# Patient Record
Sex: MALE | Race: BLACK OR AFRICAN AMERICAN | Employment: OTHER | URBAN - METROPOLITAN AREA
[De-identification: names, ages, dates, MRNs, and addresses within clinical notes are randomized per-mention and may not be internally consistent; named-entity substitution may affect disease eponyms.]

---

## 2018-05-31 ENCOUNTER — OFFICE VISIT (OUTPATIENT)
Dept: FAMILY MEDICINE CLINIC | Age: 43
End: 2018-05-31
Payer: MEDICAID

## 2018-05-31 VITALS
RESPIRATION RATE: 20 BRPM | HEIGHT: 67 IN | WEIGHT: 177.4 LBS | DIASTOLIC BLOOD PRESSURE: 70 MMHG | SYSTOLIC BLOOD PRESSURE: 110 MMHG | OXYGEN SATURATION: 97 % | BODY MASS INDEX: 27.84 KG/M2 | TEMPERATURE: 97.5 F | HEART RATE: 81 BPM

## 2018-05-31 DIAGNOSIS — K58.0 IRRITABLE BOWEL SYNDROME WITH DIARRHEA: ICD-10-CM

## 2018-05-31 DIAGNOSIS — Z72.51 UNPROTECTED SEX: ICD-10-CM

## 2018-05-31 DIAGNOSIS — I10 ESSENTIAL HYPERTENSION: ICD-10-CM

## 2018-05-31 DIAGNOSIS — L73.9 FOLLICULITIS: ICD-10-CM

## 2018-05-31 DIAGNOSIS — E78.00 PURE HYPERCHOLESTEROLEMIA: ICD-10-CM

## 2018-05-31 DIAGNOSIS — F10.11 HISTORY OF ALCOHOL ABUSE: ICD-10-CM

## 2018-05-31 DIAGNOSIS — J32.9 CHRONIC SINUSITIS, UNSPECIFIED LOCATION: ICD-10-CM

## 2018-05-31 DIAGNOSIS — J30.2 CHRONIC SEASONAL ALLERGIC RHINITIS, UNSPECIFIED TRIGGER: ICD-10-CM

## 2018-05-31 DIAGNOSIS — F41.9 ANXIETY: ICD-10-CM

## 2018-05-31 DIAGNOSIS — B00.9 HERPES SIMPLEX TYPE II INFECTION: ICD-10-CM

## 2018-05-31 DIAGNOSIS — J32.4 PANSINUSITIS, UNSPECIFIED CHRONICITY: ICD-10-CM

## 2018-05-31 DIAGNOSIS — L30.9 DERMATITIS: ICD-10-CM

## 2018-05-31 DIAGNOSIS — K21.9 GASTRIC REFLUX: Primary | ICD-10-CM

## 2018-05-31 DIAGNOSIS — Z83.3 FAMILY HISTORY OF DIABETES MELLITUS: ICD-10-CM

## 2018-05-31 DIAGNOSIS — Z72.51 UNPROTECTED SEXUAL INTERCOURSE: ICD-10-CM

## 2018-05-31 DIAGNOSIS — Z00.00 PREVENTATIVE HEALTH CARE: ICD-10-CM

## 2018-05-31 PROBLEM — E78.5 HYPERLIPIDEMIA: Status: ACTIVE | Noted: 2018-05-31

## 2018-05-31 PROCEDURE — G8419 CALC BMI OUT NRM PARAM NOF/U: HCPCS | Performed by: NURSE PRACTITIONER

## 2018-05-31 PROCEDURE — G8427 DOCREV CUR MEDS BY ELIG CLIN: HCPCS | Performed by: NURSE PRACTITIONER

## 2018-05-31 PROCEDURE — 1036F TOBACCO NON-USER: CPT | Performed by: NURSE PRACTITIONER

## 2018-05-31 PROCEDURE — 90715 TDAP VACCINE 7 YRS/> IM: CPT | Performed by: NURSE PRACTITIONER

## 2018-05-31 PROCEDURE — 90471 IMMUNIZATION ADMIN: CPT | Performed by: NURSE PRACTITIONER

## 2018-05-31 PROCEDURE — 99205 OFFICE O/P NEW HI 60 MIN: CPT | Performed by: NURSE PRACTITIONER

## 2018-05-31 RX ORDER — PANTOPRAZOLE SODIUM 40 MG/1
40 TABLET, DELAYED RELEASE ORAL DAILY PRN
Qty: 30 TABLET | Refills: 0 | Status: CANCELLED | OUTPATIENT
Start: 2018-05-31

## 2018-05-31 RX ORDER — RANITIDINE 150 MG/1
150 TABLET ORAL 2 TIMES DAILY
Qty: 60 TABLET | Refills: 3 | Status: SHIPPED | OUTPATIENT
Start: 2018-05-31 | End: 2018-11-26 | Stop reason: SDUPTHER

## 2018-05-31 RX ORDER — FLUTICASONE PROPIONATE 50 MCG
1 SPRAY, SUSPENSION (ML) NASAL DAILY
Qty: 1 BOTTLE | Refills: 3 | Status: SHIPPED | OUTPATIENT
Start: 2018-05-31 | End: 2019-07-15 | Stop reason: SDUPTHER

## 2018-05-31 RX ORDER — DOXYCYCLINE HYCLATE 100 MG/1
100 CAPSULE ORAL 2 TIMES DAILY
Qty: 20 CAPSULE | Refills: 0 | Status: SHIPPED | OUTPATIENT
Start: 2018-05-31 | End: 2018-06-10

## 2018-05-31 RX ORDER — LOSARTAN POTASSIUM 50 MG/1
50 TABLET ORAL DAILY
COMMUNITY
End: 2018-05-31 | Stop reason: SDUPTHER

## 2018-05-31 RX ORDER — LOSARTAN POTASSIUM 50 MG/1
50 TABLET ORAL DAILY
Qty: 30 TABLET | Refills: 1 | Status: SHIPPED | OUTPATIENT
Start: 2018-05-31 | End: 2018-09-10 | Stop reason: SDUPTHER

## 2018-05-31 RX ORDER — TRIAMCINOLONE ACETONIDE 1 MG/G
CREAM TOPICAL
Qty: 45 G | Refills: 0 | Status: SHIPPED | OUTPATIENT
Start: 2018-05-31 | End: 2019-10-14 | Stop reason: ALTCHOICE

## 2018-05-31 ASSESSMENT — ENCOUNTER SYMPTOMS
EYE DISCHARGE: 0
ABDOMINAL PAIN: 0
COUGH: 0
BLOOD IN STOOL: 0
SHORTNESS OF BREATH: 0

## 2018-05-31 ASSESSMENT — PATIENT HEALTH QUESTIONNAIRE - PHQ9
SUM OF ALL RESPONSES TO PHQ QUESTIONS 1-9: 1
SUM OF ALL RESPONSES TO PHQ9 QUESTIONS 1 & 2: 1
1. LITTLE INTEREST OR PLEASURE IN DOING THINGS: 0
2. FEELING DOWN, DEPRESSED OR HOPELESS: 1

## 2018-06-11 ENCOUNTER — TELEPHONE (OUTPATIENT)
Dept: FAMILY MEDICINE CLINIC | Age: 43
End: 2018-06-11

## 2018-06-11 DIAGNOSIS — B00.9 HERPES SIMPLEX TYPE II INFECTION: Primary | ICD-10-CM

## 2018-06-11 RX ORDER — ACYCLOVIR 200 MG/1
200 CAPSULE ORAL
Qty: 25 CAPSULE | Refills: 0 | Status: SHIPPED | OUTPATIENT
Start: 2018-06-11 | End: 2018-06-16

## 2018-06-18 ENCOUNTER — OFFICE VISIT (OUTPATIENT)
Dept: FAMILY MEDICINE CLINIC | Age: 43
End: 2018-06-18
Payer: COMMERCIAL

## 2018-06-18 ENCOUNTER — HOSPITAL ENCOUNTER (OUTPATIENT)
Age: 43
Discharge: HOME | End: 2018-06-18
Payer: COMMERCIAL

## 2018-06-18 VITALS
SYSTOLIC BLOOD PRESSURE: 126 MMHG | BODY MASS INDEX: 27.52 KG/M2 | OXYGEN SATURATION: 98 % | WEIGHT: 181.6 LBS | DIASTOLIC BLOOD PRESSURE: 81 MMHG | HEIGHT: 68 IN | HEART RATE: 75 BPM

## 2018-06-18 DIAGNOSIS — M54.2 NECK PAIN ON RIGHT SIDE: ICD-10-CM

## 2018-06-18 DIAGNOSIS — I10 ESSENTIAL HYPERTENSION: ICD-10-CM

## 2018-06-18 DIAGNOSIS — B35.1 FUNGAL NAIL INFECTION: ICD-10-CM

## 2018-06-18 DIAGNOSIS — Z00.00 PREVENTATIVE HEALTH CARE: ICD-10-CM

## 2018-06-18 DIAGNOSIS — K58.0 IRRITABLE BOWEL SYNDROME WITH DIARRHEA: Primary | ICD-10-CM

## 2018-06-18 DIAGNOSIS — E78.00 PURE HYPERCHOLESTEROLEMIA: ICD-10-CM

## 2018-06-18 DIAGNOSIS — M76.60 ACHILLES TENDON PAIN: ICD-10-CM

## 2018-06-18 DIAGNOSIS — J30.1 HAY FEVER: ICD-10-CM

## 2018-06-18 DIAGNOSIS — Z72.51 UNPROTECTED SEXUAL INTERCOURSE: ICD-10-CM

## 2018-06-18 DIAGNOSIS — B35.1 ONYCHOMYCOSIS: ICD-10-CM

## 2018-06-18 DIAGNOSIS — M25.511 ACUTE PAIN OF RIGHT SHOULDER: ICD-10-CM

## 2018-06-18 LAB
ALBUMIN SERPL-MCNC: 4.5 G/DL (ref 3.5–5.2)
ALBUMIN/GLOBULIN RATIO: NORMAL (ref 1–2.5)
ALP BLD-CCNC: 73 U/L (ref 40–129)
ALT SERPL-CCNC: 23 U/L (ref 5–41)
ANION GAP SERPL CALCULATED.3IONS-SCNC: 12 MMOL/L (ref 9–17)
AST SERPL-CCNC: 22 U/L
BILIRUB SERPL-MCNC: 0.71 MG/DL (ref 0.3–1.2)
BUN BLDV-MCNC: 13 MG/DL (ref 6–20)
BUN/CREAT BLD: NORMAL (ref 9–20)
CALCIUM SERPL-MCNC: 9.6 MG/DL (ref 8.6–10.4)
CHLORIDE BLD-SCNC: 100 MMOL/L (ref 98–107)
CHOLESTEROL/HDL RATIO: 3.2
CHOLESTEROL: 188 MG/DL
CO2: 28 MMOL/L (ref 20–31)
CREAT SERPL-MCNC: 0.92 MG/DL (ref 0.7–1.2)
ESTIMATED AVERAGE GLUCOSE: 111 MG/DL
GFR AFRICAN AMERICAN: >60 ML/MIN
GFR NON-AFRICAN AMERICAN: >60 ML/MIN
GFR SERPL CREATININE-BSD FRML MDRD: NORMAL ML/MIN/{1.73_M2}
GFR SERPL CREATININE-BSD FRML MDRD: NORMAL ML/MIN/{1.73_M2}
GLUCOSE BLD-MCNC: 87 MG/DL (ref 70–99)
HBA1C MFR BLD: 5.5 % (ref 4–6)
HCT VFR BLD CALC: 44.2 % (ref 41–53)
HDLC SERPL-MCNC: 59 MG/DL
HEMOGLOBIN: 14.7 G/DL (ref 13.5–17.5)
HIV AG/AB: NONREACTIVE
LDL CHOLESTEROL: 114 MG/DL (ref 0–130)
MCH RBC QN AUTO: 29.8 PG (ref 26–34)
MCHC RBC AUTO-ENTMCNC: 33.3 G/DL (ref 31–37)
MCV RBC AUTO: 89.4 FL (ref 80–100)
NRBC AUTOMATED: NORMAL PER 100 WBC
PDW BLD-RTO: 14.2 % (ref 11.5–14.9)
PLATELET # BLD: 266 K/UL (ref 150–450)
PMV BLD AUTO: 8.5 FL (ref 6–12)
POTASSIUM SERPL-SCNC: 3.9 MMOL/L (ref 3.7–5.3)
RBC # BLD: 4.94 M/UL (ref 4.5–5.9)
SODIUM BLD-SCNC: 140 MMOL/L (ref 135–144)
T. PALLIDUM, IGG: NONREACTIVE
TOTAL PROTEIN: 7.2 G/DL (ref 6.4–8.3)
TRIGL SERPL-MCNC: 75 MG/DL
VLDLC SERPL CALC-MCNC: NORMAL MG/DL (ref 1–30)
WBC # BLD: 5.8 K/UL (ref 3.5–11)

## 2018-06-18 PROCEDURE — G8419 CALC BMI OUT NRM PARAM NOF/U: HCPCS | Performed by: NURSE PRACTITIONER

## 2018-06-18 PROCEDURE — G8427 DOCREV CUR MEDS BY ELIG CLIN: HCPCS | Performed by: NURSE PRACTITIONER

## 2018-06-18 PROCEDURE — 83036 HEMOGLOBIN GLYCOSYLATED A1C: CPT

## 2018-06-18 PROCEDURE — 4004F PT TOBACCO SCREEN RCVD TLK: CPT | Performed by: NURSE PRACTITIONER

## 2018-06-18 PROCEDURE — 99214 OFFICE O/P EST MOD 30 MIN: CPT | Performed by: NURSE PRACTITIONER

## 2018-06-18 PROCEDURE — 80061 LIPID PANEL: CPT

## 2018-06-18 PROCEDURE — 80053 COMPREHEN METABOLIC PANEL: CPT

## 2018-06-18 PROCEDURE — 36415 COLL VENOUS BLD VENIPUNCTURE: CPT

## 2018-06-18 PROCEDURE — 85027 COMPLETE CBC AUTOMATED: CPT

## 2018-06-18 PROCEDURE — 87389 HIV-1 AG W/HIV-1&-2 AB AG IA: CPT

## 2018-06-18 PROCEDURE — 86780 TREPONEMA PALLIDUM: CPT

## 2018-06-18 RX ORDER — CETIRIZINE HYDROCHLORIDE 10 MG/1
10 TABLET ORAL NIGHTLY PRN
Qty: 30 TABLET | Refills: 5 | Status: SHIPPED | OUTPATIENT
Start: 2018-06-18 | End: 2019-07-15

## 2018-06-18 RX ORDER — CHOLECALCIFEROL (VITAMIN D3) 125 MCG
5 CAPSULE ORAL DAILY
COMMUNITY
End: 2022-07-18 | Stop reason: ALTCHOICE

## 2018-06-18 RX ORDER — CLOTRIMAZOLE AND BETAMETHASONE DIPROPIONATE 10; .64 MG/G; MG/G
CREAM TOPICAL
Qty: 45 G | Refills: 1 | Status: SHIPPED | OUTPATIENT
Start: 2018-06-18 | End: 2018-08-02 | Stop reason: SDUPTHER

## 2018-06-18 ASSESSMENT — ENCOUNTER SYMPTOMS
COUGH: 0
BLOOD IN STOOL: 0
EYE DISCHARGE: 0
SHORTNESS OF BREATH: 0
ABDOMINAL PAIN: 0

## 2018-06-26 ENCOUNTER — HOSPITAL ENCOUNTER (OUTPATIENT)
Dept: PHYSICAL THERAPY | Age: 43
Setting detail: THERAPIES SERIES
Discharge: HOME | End: 2018-06-26
Payer: COMMERCIAL

## 2018-06-26 PROCEDURE — 97161 PT EVAL LOW COMPLEX 20 MIN: CPT

## 2018-06-26 ASSESSMENT — PAIN DESCRIPTION - ONSET: ONSET: SUDDEN

## 2018-06-26 ASSESSMENT — PAIN SCALES - GENERAL: PAINLEVEL_OUTOF10: 5

## 2018-06-26 ASSESSMENT — PAIN DESCRIPTION - LOCATION: LOCATION: SHOULDER

## 2018-06-26 ASSESSMENT — PAIN DESCRIPTION - PROGRESSION: CLINICAL_PROGRESSION: GRADUALLY WORSENING

## 2018-06-26 ASSESSMENT — PAIN DESCRIPTION - ORIENTATION: ORIENTATION: RIGHT

## 2018-06-26 ASSESSMENT — PAIN DESCRIPTION - FREQUENCY: FREQUENCY: CONTINUOUS

## 2018-06-26 ASSESSMENT — PAIN DESCRIPTION - PAIN TYPE: TYPE: CHRONIC PAIN

## 2018-07-26 DIAGNOSIS — K58.0 IRRITABLE BOWEL SYNDROME WITH DIARRHEA: ICD-10-CM

## 2018-07-27 RX ORDER — CITALOPRAM 10 MG/1
10 TABLET ORAL DAILY
Qty: 30 TABLET | Refills: 3 | Status: SHIPPED | OUTPATIENT
Start: 2018-07-27 | End: 2020-10-10 | Stop reason: SDUPTHER

## 2018-08-02 ENCOUNTER — OFFICE VISIT (OUTPATIENT)
Dept: FAMILY MEDICINE CLINIC | Age: 43
End: 2018-08-02
Payer: COMMERCIAL

## 2018-08-02 VITALS
SYSTOLIC BLOOD PRESSURE: 128 MMHG | HEART RATE: 73 BPM | OXYGEN SATURATION: 98 % | TEMPERATURE: 97.7 F | HEIGHT: 69 IN | BODY MASS INDEX: 27.76 KG/M2 | DIASTOLIC BLOOD PRESSURE: 82 MMHG | WEIGHT: 187.4 LBS

## 2018-08-02 DIAGNOSIS — F32.0 MILD SINGLE CURRENT EPISODE OF MAJOR DEPRESSIVE DISORDER (HCC): ICD-10-CM

## 2018-08-02 DIAGNOSIS — F17.290 CIGAR SMOKER: ICD-10-CM

## 2018-08-02 DIAGNOSIS — F41.9 ANXIETY: ICD-10-CM

## 2018-08-02 DIAGNOSIS — B37.2 YEAST DERMATITIS: Primary | ICD-10-CM

## 2018-08-02 PROCEDURE — 90732 PPSV23 VACC 2 YRS+ SUBQ/IM: CPT | Performed by: NURSE PRACTITIONER

## 2018-08-02 PROCEDURE — G8419 CALC BMI OUT NRM PARAM NOF/U: HCPCS | Performed by: NURSE PRACTITIONER

## 2018-08-02 PROCEDURE — 4004F PT TOBACCO SCREEN RCVD TLK: CPT | Performed by: NURSE PRACTITIONER

## 2018-08-02 PROCEDURE — 90471 IMMUNIZATION ADMIN: CPT | Performed by: NURSE PRACTITIONER

## 2018-08-02 PROCEDURE — 99214 OFFICE O/P EST MOD 30 MIN: CPT | Performed by: NURSE PRACTITIONER

## 2018-08-02 PROCEDURE — G8427 DOCREV CUR MEDS BY ELIG CLIN: HCPCS | Performed by: NURSE PRACTITIONER

## 2018-08-02 RX ORDER — CLOTRIMAZOLE AND BETAMETHASONE DIPROPIONATE 10; .64 MG/G; MG/G
CREAM TOPICAL
Qty: 45 G | Refills: 0 | Status: SHIPPED | OUTPATIENT
Start: 2018-08-02 | End: 2018-11-26 | Stop reason: SDUPTHER

## 2018-08-02 ASSESSMENT — ENCOUNTER SYMPTOMS
ABDOMINAL PAIN: 0
SHORTNESS OF BREATH: 0
COUGH: 0
EYE DISCHARGE: 0
BLOOD IN STOOL: 0

## 2018-08-02 NOTE — PROGRESS NOTES
[address] [facility]  [address] [facility] [address]  [address] [facility] [address]  Dept: [phone]  Dept Fax: [phone]    Reyna Le is a 37 y.o. male who presents today for his medical conditions/complaints as noted below. Reyna Le is c/o of Rash        HPI:     Patient presents with:  Rash  Groin area has cleared, hard follicles noted. No itching. Aleksandr Callaway is psychiatrist           Past Medical History:   Diagnosis Date    Allergic rhinitis     Anxiety     Gastric reflux 3/30/2016    Headache(784.0)     Hepatitis     Hyperlipidemia 5/31/2018    Hypertension     IBS (irritable bowel syndrome) 12/17/2014    Insomnia     Iron deficiency anemia     Mild single current episode of major depressive disorder ([facility] [address] 8/2/2018    Pancreatitis       Past Surgical History:   Procedure Laterality Date    ANKLE SURGERY  02/23/14    HAND SURGERY  08/02/13    URETHRA SURGERY  1985       Family History   Problem Relation Age of Onset    Hypertension Mother     Diabetes Mother        Social History   Substance Use Topics    Smoking status: Current Some Day Smoker     Types: Cigars    Smokeless tobacco: Never Used      Comment: quit 2016    Alcohol use No      Comment: quit 2016; prev daily drinking       Current Outpatient Prescriptions   Medication Sig Dispense Refill    clotrimazole-betamethasone (LOTRISONE) 1-0.05 % cream Apply topically 2 times daily. 45 g 0    citalopram (CELEXA) 10 MG tablet Take 1 tablet by mouth daily 30 tablet 3    melatonin 5 MG TABS tablet Take 5 mg by mouth daily      ciclopirox (PENLAC) 8 % solution Apply topically nightly.  1 Bottle 1    cetirizine (ZYRTEC) 10 MG tablet Take 1 tablet by mouth nightly as needed for Allergies or Rhinitis 30 tablet 5    ranitidine (ZANTAC) 150 MG tablet Take 1 tablet by mouth 2 times daily 60 tablet 3    losartan (COZAAR) 50 MG tablet Take 1 tablet by mouth daily 30 tablet 1    triamcinolone (1.753 m)   Wt 187 lb 6.4 oz (85 kg)   SpO2 98%   BMI 27.67 kg/m²     CBC:   Lab Results   Component Value Date    WBC 5.8 06/18/2018    RBC 4.94 06/18/2018    HGB 14.7 06/18/2018    HCT 44.2 06/18/2018    MCV 89.4 06/18/2018    MCH 29.8 06/18/2018    MCHC 33.3 06/18/2018    RDW 14.2 06/18/2018     06/18/2018    MPV 8.5 06/18/2018     CMP:    Lab Results   Component Value Date     06/18/2018    K 3.9 06/18/2018     06/18/2018    CO2 28 06/18/2018    BUN 13 06/18/2018    CREATININE 0.92 06/18/2018    GFRAA >60 06/18/2018    LABGLOM >60 06/18/2018    GLUCOSE 87 06/18/2018    GLUCOSE 79 09/30/2011    PROT 7.2 06/18/2018    LABALBU 4.5 06/18/2018    CALCIUM 9.6 06/18/2018    BILITOT 0.71 06/18/2018    ALKPHOS 73 06/18/2018    AST 22 06/18/2018    ALT 23 06/18/2018     Lab Results   Component Value Date    LABA1C 5.5 06/18/2018           Assessment:       Diagnosis Orders   1. Yeast dermatitis  clotrimazole-betamethasone (LOTRISONE) 1-0.05 % cream   2. Anxiety     3. Mild single current episode of major depressive disorder ([facility] [address]     4. Cigar smoker  Pneumococcal polysaccharide vaccine 23-valent greater than or equal to 3yo subcutaneous/IM       Plan:       Diagnosis Orders   1. Yeast dermatitis  clotrimazole-betamethasone (LOTRISONE) 1-0.05 % cream   2. Anxiety     3. Mild single current episode of major depressive disorder ([address]     4. Cigar smoker  Pneumococcal polysaccharide vaccine 23-valent greater than or equal to 3yo subcutaneous/IM   1. Yeast dermatitis  improvjing , Medications ordered       2. Anxiety  Has celexa refils     3. Mild single current episode of major depressive disorder ([address]  On celexa,   Strongly encourged to make appt with zepf so future meds coming from theire office      Cigar smoker  Pt counseled on smoking cessation. PNA 23 today         Pt is applying for disability and therefore will need strong reltshp with this client to be able to give full eval on him.    Patient

## 2018-08-02 NOTE — PROGRESS NOTES
Visit Information    Have you changed or started any medications since your last visit including any over-the-counter medicines, vitamins, or herbal medicines? no   Have you stopped taking any of your medications? Is so, why? -  no  Are you having any side effects from any of your medications? - no    Have you seen any other physician or provider since your last visit?  no   Have you had any other diagnostic tests since your last visit?  no   Have you been seen in the emergency room and/or had an admission in a hospital since we last saw you?  no   Have you had your routine dental cleaning in the past 6 months?  no     Do you have an active MyChart account? If no, what is the barrier?   Yes    Patient Care Team:  CHAIM Cordova CNP as PCP - General (Certified Nurse Practitioner)  Daphnie Christopher MD as Consulting Physician (Gastroenterology)    Medical History Review  Past Medical, Family, and Social History reviewed and does contribute to the patient presenting condition    Health Maintenance   Topic Date Due    Pneumococcal med risk (1 of 1 - PPSV23) 03/20/1994    Flu vaccine (1) 09/01/2018    Potassium monitoring  06/18/2019    Creatinine monitoring  06/18/2019    Colon cancer screen colonoscopy  09/01/2019    Lipid screen  06/18/2023    DTaP/Tdap/Td vaccine (2 - Td) 05/31/2028    HIV screen  Completed

## 2018-09-10 DIAGNOSIS — I10 ESSENTIAL HYPERTENSION: ICD-10-CM

## 2018-09-11 RX ORDER — LOSARTAN POTASSIUM 50 MG/1
TABLET ORAL
Qty: 30 TABLET | Refills: 1 | Status: SHIPPED | OUTPATIENT
Start: 2018-09-11 | End: 2018-12-24 | Stop reason: SDUPTHER

## 2018-09-11 NOTE — TELEPHONE ENCOUNTER
Please Approve or Refuse.   Send to Pharmacy per Pt's Request:      Next Visit Date:  10/10/2018   Last Visit Date: 8/2/2018    Hemoglobin A1C (%)   Date Value   06/18/2018 5.5             ( goal A1C is < 7)   BP Readings from Last 3 Encounters:   08/02/18 128/82   06/18/18 126/81   05/31/18 110/70          (goal 120/80)  Lab Results   Component Value Date    BUN 13 06/18/2018     Lab Results   Component Value Date    CREATININE 0.92 06/18/2018     Lab Results   Component Value Date    K 3.9 06/18/2018     @[id number])@

## 2018-11-26 ENCOUNTER — HOSPITAL ENCOUNTER (OUTPATIENT)
Age: 43
Discharge: HOME | End: 2018-11-26
Payer: COMMERCIAL

## 2018-11-26 ENCOUNTER — OFFICE VISIT (OUTPATIENT)
Dept: FAMILY MEDICINE CLINIC | Age: 43
End: 2018-11-26
Payer: COMMERCIAL

## 2018-11-26 VITALS
HEIGHT: 68 IN | BODY MASS INDEX: 29.1 KG/M2 | OXYGEN SATURATION: 97 % | TEMPERATURE: 98 F | DIASTOLIC BLOOD PRESSURE: 84 MMHG | SYSTOLIC BLOOD PRESSURE: 134 MMHG | WEIGHT: 192 LBS | HEART RATE: 77 BPM

## 2018-11-26 DIAGNOSIS — M54.2 CERVICAL SPINE PAIN: ICD-10-CM

## 2018-11-26 DIAGNOSIS — F41.9 ANXIETY: ICD-10-CM

## 2018-11-26 DIAGNOSIS — E55.9 VITAMIN D DEFICIENCY: ICD-10-CM

## 2018-11-26 DIAGNOSIS — H53.149 PHOTOPHOBIA: ICD-10-CM

## 2018-11-26 DIAGNOSIS — K21.9 GASTROESOPHAGEAL REFLUX DISEASE, ESOPHAGITIS PRESENCE NOT SPECIFIED: ICD-10-CM

## 2018-11-26 DIAGNOSIS — Z23 NEED FOR PROPHYLACTIC VACCINATION AND INOCULATION AGAINST INFLUENZA: ICD-10-CM

## 2018-11-26 DIAGNOSIS — B37.2 YEAST DERMATITIS: Primary | ICD-10-CM

## 2018-11-26 DIAGNOSIS — I10 ESSENTIAL HYPERTENSION: ICD-10-CM

## 2018-11-26 DIAGNOSIS — L60.8 TOENAIL DEFORMITY: ICD-10-CM

## 2018-11-26 DIAGNOSIS — F32.0 MILD SINGLE CURRENT EPISODE OF MAJOR DEPRESSIVE DISORDER (HCC): ICD-10-CM

## 2018-11-26 DIAGNOSIS — K58.0 IRRITABLE BOWEL SYNDROME WITH DIARRHEA: ICD-10-CM

## 2018-11-26 DIAGNOSIS — B35.1 ONYCHOMYCOSIS: ICD-10-CM

## 2018-11-26 LAB — VITAMIN D 25-HYDROXY: 23.8 NG/ML (ref 30–100)

## 2018-11-26 PROCEDURE — 82306 VITAMIN D 25 HYDROXY: CPT

## 2018-11-26 PROCEDURE — 90471 IMMUNIZATION ADMIN: CPT | Performed by: NURSE PRACTITIONER

## 2018-11-26 PROCEDURE — 90686 IIV4 VACC NO PRSV 0.5 ML IM: CPT | Performed by: NURSE PRACTITIONER

## 2018-11-26 PROCEDURE — 36415 COLL VENOUS BLD VENIPUNCTURE: CPT

## 2018-11-26 PROCEDURE — 1036F TOBACCO NON-USER: CPT | Performed by: NURSE PRACTITIONER

## 2018-11-26 PROCEDURE — G8427 DOCREV CUR MEDS BY ELIG CLIN: HCPCS | Performed by: NURSE PRACTITIONER

## 2018-11-26 PROCEDURE — 99214 OFFICE O/P EST MOD 30 MIN: CPT | Performed by: NURSE PRACTITIONER

## 2018-11-26 PROCEDURE — G8482 FLU IMMUNIZE ORDER/ADMIN: HCPCS | Performed by: NURSE PRACTITIONER

## 2018-11-26 PROCEDURE — G8419 CALC BMI OUT NRM PARAM NOF/U: HCPCS | Performed by: NURSE PRACTITIONER

## 2018-11-26 RX ORDER — B-COMPLEX WITH VITAMIN C
1 TABLET ORAL 2 TIMES DAILY
Qty: 60 TABLET | Refills: 5 | Status: CANCELLED | OUTPATIENT
Start: 2018-11-26 | End: 2018-12-26

## 2018-11-26 RX ORDER — CLOTRIMAZOLE AND BETAMETHASONE DIPROPIONATE 10; .64 MG/G; MG/G
CREAM TOPICAL
Qty: 45 G | Refills: 0 | Status: SHIPPED | OUTPATIENT
Start: 2018-11-26 | End: 2020-10-10 | Stop reason: SDUPTHER

## 2018-11-26 RX ORDER — RANITIDINE 150 MG/1
150 TABLET ORAL 2 TIMES DAILY
Qty: 60 TABLET | Refills: 3 | Status: SHIPPED | OUTPATIENT
Start: 2018-11-26 | End: 2019-10-14 | Stop reason: ALTCHOICE

## 2018-11-26 RX ORDER — MULTIVIT-MIN/IRON/FOLIC ACID/K 18-600-40
1 CAPSULE ORAL DAILY
Qty: 30 CAPSULE | Refills: 2 | Status: SHIPPED | OUTPATIENT
Start: 2018-11-26 | End: 2019-07-15 | Stop reason: DRUGHIGH

## 2018-11-26 ASSESSMENT — ENCOUNTER SYMPTOMS
EYE DISCHARGE: 0
SHORTNESS OF BREATH: 0
COUGH: 0
PHOTOPHOBIA: 1
BLOOD IN STOOL: 0
ABDOMINAL PAIN: 0

## 2018-11-27 ENCOUNTER — TELEPHONE (OUTPATIENT)
Dept: FAMILY MEDICINE CLINIC | Age: 43
End: 2018-11-27

## 2018-11-27 DIAGNOSIS — E55.9 VITAMIN D DEFICIENCY: Primary | ICD-10-CM

## 2018-11-27 RX ORDER — ERGOCALCIFEROL 1.25 MG/1
50000 CAPSULE ORAL WEEKLY
Qty: 12 CAPSULE | Refills: 0 | Status: SHIPPED | OUTPATIENT
Start: 2018-11-27 | End: 2019-03-04

## 2018-12-24 DIAGNOSIS — I10 ESSENTIAL HYPERTENSION: ICD-10-CM

## 2018-12-24 RX ORDER — LOSARTAN POTASSIUM 50 MG/1
TABLET ORAL
Qty: 30 TABLET | Refills: 0 | Status: SHIPPED | OUTPATIENT
Start: 2018-12-24 | End: 2019-02-13 | Stop reason: SDUPTHER

## 2019-01-03 ENCOUNTER — TELEPHONE (OUTPATIENT)
Dept: FAMILY MEDICINE CLINIC | Age: 44
End: 2019-01-03

## 2019-01-03 DIAGNOSIS — M54.2 CERVICAL SPINE PAIN: ICD-10-CM

## 2019-01-03 DIAGNOSIS — L60.8 TOENAIL DEFORMITY: ICD-10-CM

## 2019-01-08 ENCOUNTER — OFFICE VISIT (OUTPATIENT)
Dept: FAMILY MEDICINE CLINIC | Age: 44
End: 2019-01-08
Payer: COMMERCIAL

## 2019-01-08 VITALS
BODY MASS INDEX: 30.13 KG/M2 | HEIGHT: 67 IN | TEMPERATURE: 97.7 F | HEART RATE: 81 BPM | SYSTOLIC BLOOD PRESSURE: 131 MMHG | OXYGEN SATURATION: 94 % | WEIGHT: 192 LBS | RESPIRATION RATE: 16 BRPM | DIASTOLIC BLOOD PRESSURE: 85 MMHG

## 2019-01-08 DIAGNOSIS — J06.9 UPPER RESPIRATORY TRACT INFECTION, UNSPECIFIED TYPE: Primary | ICD-10-CM

## 2019-01-08 DIAGNOSIS — J02.9 ACUTE PHARYNGITIS, UNSPECIFIED ETIOLOGY: ICD-10-CM

## 2019-01-08 PROCEDURE — 99213 OFFICE O/P EST LOW 20 MIN: CPT | Performed by: PHYSICIAN ASSISTANT

## 2019-01-08 RX ORDER — AMOXICILLIN 875 MG/1
875 TABLET, COATED ORAL 2 TIMES DAILY
Qty: 20 TABLET | Refills: 0 | Status: SHIPPED | OUTPATIENT
Start: 2019-01-08 | End: 2019-01-18

## 2019-01-08 RX ORDER — BROMPHENIRAMINE MALEATE, PSEUDOEPHEDRINE HYDROCHLORIDE, AND DEXTROMETHORPHAN HYDROBROMIDE 2; 30; 10 MG/5ML; MG/5ML; MG/5ML
SYRUP ORAL
Qty: 180 ML | Refills: 0 | Status: SHIPPED | OUTPATIENT
Start: 2019-01-08 | End: 2019-03-04

## 2019-01-10 ASSESSMENT — ENCOUNTER SYMPTOMS
WHEEZING: 0
SWOLLEN GLANDS: 1
SINUS PAIN: 0
SINUS PRESSURE: 1
RHINORRHEA: 0
NAUSEA: 0
EYES NEGATIVE: 1
ABDOMINAL PAIN: 0
SHORTNESS OF BREATH: 0
VOMITING: 0
DIARRHEA: 0
CHEST TIGHTNESS: 0
COUGH: 1
SORE THROAT: 1

## 2019-02-15 ENCOUNTER — OFFICE VISIT (OUTPATIENT)
Dept: FAMILY MEDICINE CLINIC | Age: 44
End: 2019-02-15
Payer: COMMERCIAL

## 2019-02-15 VITALS
HEART RATE: 71 BPM | DIASTOLIC BLOOD PRESSURE: 94 MMHG | BODY MASS INDEX: 31.18 KG/M2 | RESPIRATION RATE: 19 BRPM | OXYGEN SATURATION: 100 % | SYSTOLIC BLOOD PRESSURE: 128 MMHG | TEMPERATURE: 97.5 F | WEIGHT: 194 LBS | HEIGHT: 66 IN

## 2019-02-15 DIAGNOSIS — R68.89 FLU-LIKE SYMPTOMS: ICD-10-CM

## 2019-02-15 DIAGNOSIS — J01.10 ACUTE NON-RECURRENT FRONTAL SINUSITIS: ICD-10-CM

## 2019-02-15 DIAGNOSIS — B34.9 VIRAL SYNDROME: Primary | ICD-10-CM

## 2019-02-15 DIAGNOSIS — R51.9 NONINTRACTABLE HEADACHE, UNSPECIFIED CHRONICITY PATTERN, UNSPECIFIED HEADACHE TYPE: ICD-10-CM

## 2019-02-15 DIAGNOSIS — R52 GENERALIZED BODY ACHES: ICD-10-CM

## 2019-02-15 LAB
INFLUENZA A ANTIBODY: NEGATIVE
INFLUENZA B ANTIBODY: NEGATIVE

## 2019-02-15 PROCEDURE — 87804 INFLUENZA ASSAY W/OPTIC: CPT | Performed by: FAMILY MEDICINE

## 2019-02-15 PROCEDURE — 99214 OFFICE O/P EST MOD 30 MIN: CPT | Performed by: FAMILY MEDICINE

## 2019-02-15 RX ORDER — FLUTICASONE PROPIONATE 50 MCG
1 SPRAY, SUSPENSION (ML) NASAL 2 TIMES DAILY
Qty: 1 BOTTLE | Refills: 2 | Status: SHIPPED | OUTPATIENT
Start: 2019-02-15 | End: 2019-03-04

## 2019-02-15 RX ORDER — AZITHROMYCIN 250 MG/1
TABLET, FILM COATED ORAL
Qty: 1 PACKET | Refills: 0 | Status: SHIPPED | OUTPATIENT
Start: 2019-02-15 | End: 2019-03-04

## 2019-02-15 RX ORDER — BROMPHENIRAMINE MALEATE, PSEUDOEPHEDRINE HYDROCHLORIDE, AND DEXTROMETHORPHAN HYDROBROMIDE 2; 30; 10 MG/5ML; MG/5ML; MG/5ML
5 SYRUP ORAL 4 TIMES DAILY PRN
Qty: 180 ML | Refills: 0 | Status: SHIPPED | OUTPATIENT
Start: 2019-02-15 | End: 2019-07-15 | Stop reason: SDUPTHER

## 2019-02-15 ASSESSMENT — ENCOUNTER SYMPTOMS
SWOLLEN GLANDS: 1
WHEEZING: 1
PHOTOPHOBIA: 0
RHINORRHEA: 1
SINUS PRESSURE: 1
SCALP TENDERNESS: 0
COUGH: 1
EYES NEGATIVE: 1
SORE THROAT: 1
SINUS PAIN: 1
ALLERGIC/IMMUNOLOGIC NEGATIVE: 1
GASTROINTESTINAL NEGATIVE: 1

## 2019-02-21 ENCOUNTER — OFFICE VISIT (OUTPATIENT)
Dept: FAMILY MEDICINE CLINIC | Age: 44
End: 2019-02-21
Payer: COMMERCIAL

## 2019-02-21 VITALS
HEIGHT: 66 IN | HEART RATE: 73 BPM | DIASTOLIC BLOOD PRESSURE: 87 MMHG | BODY MASS INDEX: 31.47 KG/M2 | TEMPERATURE: 97.6 F | RESPIRATION RATE: 18 BRPM | WEIGHT: 195.8 LBS | SYSTOLIC BLOOD PRESSURE: 132 MMHG

## 2019-02-21 DIAGNOSIS — J06.9 ACUTE URI: Primary | ICD-10-CM

## 2019-02-21 PROCEDURE — 99213 OFFICE O/P EST LOW 20 MIN: CPT | Performed by: NURSE PRACTITIONER

## 2019-02-21 ASSESSMENT — ENCOUNTER SYMPTOMS
RHINORRHEA: 0
BLOOD IN STOOL: 0
ABDOMINAL PAIN: 0
SORE THROAT: 0
SHORTNESS OF BREATH: 0
COUGH: 1
EYE DISCHARGE: 0

## 2019-03-04 ENCOUNTER — OFFICE VISIT (OUTPATIENT)
Dept: FAMILY MEDICINE CLINIC | Age: 44
End: 2019-03-04
Payer: COMMERCIAL

## 2019-03-04 VITALS
WEIGHT: 194 LBS | HEART RATE: 78 BPM | HEIGHT: 68 IN | DIASTOLIC BLOOD PRESSURE: 74 MMHG | TEMPERATURE: 98.7 F | SYSTOLIC BLOOD PRESSURE: 138 MMHG | BODY MASS INDEX: 29.4 KG/M2

## 2019-03-04 DIAGNOSIS — J30.9 ALLERGIC RHINITIS, UNSPECIFIED SEASONALITY, UNSPECIFIED TRIGGER: ICD-10-CM

## 2019-03-04 DIAGNOSIS — F32.0 MILD SINGLE CURRENT EPISODE OF MAJOR DEPRESSIVE DISORDER (HCC): ICD-10-CM

## 2019-03-04 DIAGNOSIS — I10 ESSENTIAL HYPERTENSION: Primary | ICD-10-CM

## 2019-03-04 PROCEDURE — 99214 OFFICE O/P EST MOD 30 MIN: CPT | Performed by: FAMILY MEDICINE

## 2019-03-04 ASSESSMENT — ENCOUNTER SYMPTOMS
SHORTNESS OF BREATH: 0
SORE THROAT: 0
WHEEZING: 0
CONSTIPATION: 0
NAUSEA: 0
DIARRHEA: 0
ABDOMINAL PAIN: 0

## 2019-03-11 ENCOUNTER — OFFICE VISIT (OUTPATIENT)
Dept: FAMILY MEDICINE CLINIC | Age: 44
End: 2019-03-11
Payer: COMMERCIAL

## 2019-03-11 VITALS
TEMPERATURE: 97.5 F | DIASTOLIC BLOOD PRESSURE: 83 MMHG | OXYGEN SATURATION: 98 % | BODY MASS INDEX: 29.4 KG/M2 | WEIGHT: 194 LBS | HEART RATE: 93 BPM | SYSTOLIC BLOOD PRESSURE: 121 MMHG | RESPIRATION RATE: 18 BRPM | HEIGHT: 68 IN

## 2019-03-11 DIAGNOSIS — Z48.02 ENCOUNTER FOR REMOVAL OF SUTURES: Primary | ICD-10-CM

## 2019-03-11 PROCEDURE — 99212 OFFICE O/P EST SF 10 MIN: CPT | Performed by: FAMILY MEDICINE

## 2019-03-11 ASSESSMENT — ENCOUNTER SYMPTOMS
RESPIRATORY NEGATIVE: 1
ALLERGIC/IMMUNOLOGIC NEGATIVE: 1
EYES NEGATIVE: 1
GASTROINTESTINAL NEGATIVE: 1

## 2019-07-15 ENCOUNTER — OFFICE VISIT (OUTPATIENT)
Dept: FAMILY MEDICINE CLINIC | Age: 44
End: 2019-07-15

## 2019-07-15 VITALS
SYSTOLIC BLOOD PRESSURE: 130 MMHG | BODY MASS INDEX: 28.38 KG/M2 | RESPIRATION RATE: 16 BRPM | DIASTOLIC BLOOD PRESSURE: 88 MMHG | HEART RATE: 106 BPM | OXYGEN SATURATION: 98 % | WEIGHT: 186.6 LBS

## 2019-07-15 DIAGNOSIS — J30.1 HAY FEVER: ICD-10-CM

## 2019-07-15 DIAGNOSIS — K21.9 GASTROESOPHAGEAL REFLUX DISEASE, ESOPHAGITIS PRESENCE NOT SPECIFIED: ICD-10-CM

## 2019-07-15 DIAGNOSIS — E55.9 VITAMIN D DEFICIENCY: ICD-10-CM

## 2019-07-15 DIAGNOSIS — K58.0 IRRITABLE BOWEL SYNDROME WITH DIARRHEA: ICD-10-CM

## 2019-07-15 DIAGNOSIS — I10 ESSENTIAL HYPERTENSION: Primary | ICD-10-CM

## 2019-07-15 PROCEDURE — 99213 OFFICE O/P EST LOW 20 MIN: CPT | Performed by: FAMILY MEDICINE

## 2019-07-15 RX ORDER — RANITIDINE 150 MG/1
150 TABLET ORAL 2 TIMES DAILY
Qty: 60 TABLET | Refills: 3 | Status: CANCELLED | OUTPATIENT
Start: 2019-07-15

## 2019-07-15 RX ORDER — CETIRIZINE HYDROCHLORIDE 10 MG/1
10 TABLET ORAL DAILY
Qty: 30 TABLET | Refills: 1 | Status: SHIPPED | OUTPATIENT
Start: 2019-07-15 | End: 2020-09-11

## 2019-07-15 RX ORDER — MULTIVIT-MIN/IRON/FOLIC ACID/K 18-600-40
1 CAPSULE ORAL DAILY
Qty: 30 CAPSULE | Refills: 2 | Status: CANCELLED | OUTPATIENT
Start: 2019-07-15

## 2019-07-15 RX ORDER — CITALOPRAM 10 MG/1
10 TABLET ORAL DAILY
Qty: 30 TABLET | Refills: 3 | Status: CANCELLED | OUTPATIENT
Start: 2019-07-15

## 2019-07-15 RX ORDER — MELATONIN
1000 DAILY
Qty: 90 TABLET | Refills: 1 | Status: SHIPPED | OUTPATIENT
Start: 2019-07-15 | End: 2021-09-13 | Stop reason: SDUPTHER

## 2019-07-15 RX ORDER — FLUTICASONE PROPIONATE 50 MCG
1 SPRAY, SUSPENSION (ML) NASAL DAILY
Qty: 1 BOTTLE | Refills: 1 | Status: SHIPPED | OUTPATIENT
Start: 2019-07-15 | End: 2020-05-08

## 2019-07-15 RX ORDER — LOSARTAN POTASSIUM 50 MG/1
TABLET ORAL
Qty: 90 TABLET | Refills: 1 | Status: SHIPPED | OUTPATIENT
Start: 2019-07-15 | End: 2020-10-13 | Stop reason: SDUPTHER

## 2019-07-15 RX ORDER — OMEPRAZOLE 20 MG/1
20 CAPSULE, DELAYED RELEASE ORAL DAILY
Qty: 30 CAPSULE | Refills: 3 | Status: SHIPPED | OUTPATIENT
Start: 2019-07-15 | End: 2020-05-08 | Stop reason: ALTCHOICE

## 2019-07-15 ASSESSMENT — ENCOUNTER SYMPTOMS
DIARRHEA: 1
CONSTIPATION: 1
ABDOMINAL PAIN: 1
SHORTNESS OF BREATH: 0
SORE THROAT: 0
NAUSEA: 0
COUGH: 0

## 2019-07-15 NOTE — PROGRESS NOTES
Subjective:      Patient ID: Mikayla Astudillo is a 40 y.o. male. Visit Information    Have you changed or started any medications since your last visit including any over-the-counter medicines, vitamins, or herbal medicines? no   Are you having any side effects from any of your medications? -  no  Have you stopped taking any of your medications? Is so, why? -  no    Have you seen any other physician or provider since your last visit? No  Have you had any other diagnostic tests since your last visit? No  Have you been seen in the emergency room and/or had an admission to a hospital since we last saw you? No  Have you had your routine dental cleaning in the past 6 months? yes     Have you activated your [employer] account? If not, what are your barriers? Yes     Patient Care Team:  Diandra Tomlinson MD as PCP - General (Family Medicine)  Diandra Tomlinson MD as PCP - [facility] Provider    Medical History Review  Past Medical, Family, and Social History reviewed and does contribute to the patient presenting condition    Health Maintenance   Topic Date Due    Potassium monitoring  06/18/2019    Creatinine monitoring  06/18/2019    Flu vaccine (1) 09/01/2019    Colon cancer screen colonoscopy  09/01/2019    Lipid screen  06/18/2023    DTaP/Tdap/Td vaccine (2 - Td) 05/31/2028    HIV screen  Completed    Pneumococcal 0-64 years Vaccine  Aged Out     HPI  42-year-old male is seen in the office today for follow-up for his hypertension blood pressure is controlled he is on Cozaar denies of any chest pain or shortness of breath he also has got GERD and Zantac does not seem to help and wants to be wants Prilosec and he also sees a GI he is having abdominal pain diarrhea and constipation has irritable bowel.   Has got depression and is on Celexa from his psychiatrist, has allergic rhinitis is on Zyrtec and Flonase which seems to have helped him  Review of Systems   Constitutional: Negative for appetite change and unexpected omeprazole (PRILOSEC) 20 MG delayed release capsule     Sig: Take 1 capsule by mouth daily     Dispense:  30 capsule     Refill:  3    cetirizine (ZYRTEC) 10 MG tablet     Sig: Take 1 tablet by mouth daily     Dispense:  30 tablet     Refill:  1     Return in about 4 months (around 11/15/2019) for HTN.     Continue current medications reviewed from the chart            Chelita Grey MA

## 2019-07-15 NOTE — LETTER
[facility]  [address] [place] [address] [address] [address]  Phone: [phone]  Fax: [phone]    Melody Rene MD        July 15, 2019     Patient: Vale Richard   YOB: 1975   Date of Visit: 7/15/2019       To Whom it May Concern:    Vale Richard was seen in my clinic on 7/15/2019. If you have any questions or concerns, please don't hesitate to call.     Sincerely,         Melody Rene MD

## 2019-10-14 ENCOUNTER — OFFICE VISIT (OUTPATIENT)
Dept: PODIATRY | Age: 44
End: 2019-10-14
Payer: MEDICARE

## 2019-10-14 VITALS — BODY MASS INDEX: 27.43 KG/M2 | WEIGHT: 181 LBS | HEIGHT: 68 IN

## 2019-10-14 DIAGNOSIS — M79.675 PAIN OF TOES OF BOTH FEET: ICD-10-CM

## 2019-10-14 DIAGNOSIS — M79.672 PAIN IN LEFT FOOT: ICD-10-CM

## 2019-10-14 DIAGNOSIS — M79.674 PAIN OF TOES OF BOTH FEET: ICD-10-CM

## 2019-10-14 DIAGNOSIS — B35.3 TINEA PEDIS OF BOTH FEET: ICD-10-CM

## 2019-10-14 DIAGNOSIS — M79.671 PAIN IN RIGHT FOOT: ICD-10-CM

## 2019-10-14 DIAGNOSIS — B35.1 ONYCHOMYCOSIS OF TOENAIL: Primary | ICD-10-CM

## 2019-10-14 PROCEDURE — 99203 OFFICE O/P NEW LOW 30 MIN: CPT | Performed by: PODIATRIST

## 2019-10-14 PROCEDURE — 11721 DEBRIDE NAIL 6 OR MORE: CPT | Performed by: PODIATRIST

## 2019-10-14 RX ORDER — CICLOPIROX 7.7 MG/G
GEL TOPICAL
Qty: 1 TUBE | Refills: 3 | Status: SHIPPED | OUTPATIENT
Start: 2019-10-14

## 2019-10-14 ASSESSMENT — ENCOUNTER SYMPTOMS
COLOR CHANGE: 0
NAUSEA: 0
SHORTNESS OF BREATH: 0
BACK PAIN: 0
DIARRHEA: 0

## 2020-01-13 ENCOUNTER — OFFICE VISIT (OUTPATIENT)
Dept: FAMILY MEDICINE CLINIC | Age: 45
End: 2020-01-13

## 2020-01-13 VITALS
WEIGHT: 189 LBS | HEART RATE: 86 BPM | BODY MASS INDEX: 28.64 KG/M2 | HEIGHT: 68 IN | SYSTOLIC BLOOD PRESSURE: 138 MMHG | OXYGEN SATURATION: 96 % | DIASTOLIC BLOOD PRESSURE: 86 MMHG

## 2020-01-13 PROCEDURE — 99213 OFFICE O/P EST LOW 20 MIN: CPT | Performed by: FAMILY MEDICINE

## 2020-01-13 ASSESSMENT — ENCOUNTER SYMPTOMS
NAUSEA: 0
ABDOMINAL PAIN: 0
COUGH: 0
CONSTIPATION: 0
SHORTNESS OF BREATH: 0
SORE THROAT: 0

## 2020-01-13 NOTE — PROGRESS NOTES
Gastrointestinal: Negative for abdominal pain, constipation and nausea. Endocrine: Negative for polydipsia and polyuria. Genitourinary: Negative for frequency and urgency. Musculoskeletal: Negative for arthralgias. Skin: Negative for rash. Neurological: Negative for dizziness and headaches. Objective:   Physical Exam  Vitals signs and nursing note reviewed. Constitutional:       Appearance: Normal appearance. He is well-developed. Comments: /86   Pulse 86   Ht 5' 8\" (1.727 m)   Wt 189 lb (85.7 kg)   SpO2 96%   BMI 28.74 kg/m²    HENT:      Head: Normocephalic. Right Ear: Tympanic membrane normal.      Left Ear: Tympanic membrane normal.      Nose: Nose normal.      Mouth/Throat:      Mouth: Mucous membranes are moist.      Pharynx: Oropharynx is clear. Neck:      Thyroid: No thyromegaly. Cardiovascular:      Rate and Rhythm: Normal rate and regular rhythm. Pulmonary:      Breath sounds: Normal breath sounds. No rales. Abdominal:      Palpations: Abdomen is soft. Tenderness: There is no tenderness. Musculoskeletal:         General: No tenderness. Lymphadenopathy:      Cervical: No cervical adenopathy. Skin:     Findings: No rash. Neurological:      Mental Status: He is alert and oriented to person, place, and time. Assessment:        Diagnosis Orders   1. Essential hypertension   controlled  Comprehensive Metabolic Panel    Lipid Panel   2. Hyperlipidemia, unspecified hyperlipidemia type     3.  Gastroesophageal reflux disease, esophagitis presence not specified               Plan:         Orders Placed This Encounter   Procedures    Comprehensive Metabolic Panel     Standing Status:   Future     Standing Expiration Date:   1/13/2021    Lipid Panel     Standing Status:   Future     Standing Expiration Date:   1/12/2021     Order Specific Question:   Is Patient Fasting?/# of Hours     Answer:   12     No orders of the defined types were placed in this encounter. Return in about 4 months (around 5/13/2020) for HTN.     Continue current medications reviewed from the chart            Chelita Grey MA

## 2020-01-21 ENCOUNTER — HOSPITAL ENCOUNTER (OUTPATIENT)
Age: 45
Discharge: HOME | End: 2020-01-21

## 2020-01-21 LAB
ALBUMIN SERPL-MCNC: 4.6 G/DL (ref 3.5–5.2)
ALBUMIN/GLOBULIN RATIO: NORMAL (ref 1–2.5)
ALP BLD-CCNC: 59 U/L (ref 40–129)
ALT SERPL-CCNC: 20 U/L (ref 5–41)
ANION GAP SERPL CALCULATED.3IONS-SCNC: 11 MMOL/L (ref 9–17)
AST SERPL-CCNC: 20 U/L
BILIRUB SERPL-MCNC: 0.58 MG/DL (ref 0.3–1.2)
BUN BLDV-MCNC: 15 MG/DL (ref 6–20)
BUN/CREAT BLD: NORMAL (ref 9–20)
CALCIUM SERPL-MCNC: 9.4 MG/DL (ref 8.6–10.4)
CHLORIDE BLD-SCNC: 101 MMOL/L (ref 98–107)
CHOLESTEROL/HDL RATIO: 3.4
CHOLESTEROL: 212 MG/DL
CO2: 27 MMOL/L (ref 20–31)
CREAT SERPL-MCNC: 0.92 MG/DL (ref 0.7–1.2)
GFR AFRICAN AMERICAN: >60 ML/MIN
GFR NON-AFRICAN AMERICAN: >60 ML/MIN
GFR SERPL CREATININE-BSD FRML MDRD: NORMAL ML/MIN/{1.73_M2}
GFR SERPL CREATININE-BSD FRML MDRD: NORMAL ML/MIN/{1.73_M2}
GLUCOSE BLD-MCNC: 91 MG/DL (ref 70–99)
HDLC SERPL-MCNC: 63 MG/DL
LDL CHOLESTEROL: 138 MG/DL (ref 0–130)
POTASSIUM SERPL-SCNC: 4.2 MMOL/L (ref 3.7–5.3)
SODIUM BLD-SCNC: 139 MMOL/L (ref 135–144)
TOTAL PROTEIN: 7.3 G/DL (ref 6.4–8.3)
TRIGL SERPL-MCNC: 55 MG/DL
VLDLC SERPL CALC-MCNC: ABNORMAL MG/DL (ref 1–30)

## 2020-01-21 PROCEDURE — 80053 COMPREHEN METABOLIC PANEL: CPT

## 2020-01-21 PROCEDURE — 36415 COLL VENOUS BLD VENIPUNCTURE: CPT

## 2020-01-21 PROCEDURE — 80061 LIPID PANEL: CPT

## 2020-04-21 ENCOUNTER — TELEPHONE (OUTPATIENT)
Dept: FAMILY MEDICINE CLINIC | Age: 45
End: 2020-04-21

## 2020-04-21 NOTE — TELEPHONE ENCOUNTER
lvm informing pt that  is retiring and that he will need to call back to reschedule his 5/19/20 appointment with another provider

## 2020-04-28 ENCOUNTER — TELEMEDICINE (OUTPATIENT)
Dept: FAMILY MEDICINE CLINIC | Age: 45
End: 2020-04-28
Payer: COMMERCIAL

## 2020-04-28 VITALS — BODY MASS INDEX: 28.64 KG/M2 | HEIGHT: 68 IN | WEIGHT: 189 LBS

## 2020-04-28 PROBLEM — Z98.890 H/O COLONOSCOPY WITH POLYPECTOMY: Status: ACTIVE | Noted: 2020-04-28

## 2020-04-28 PROBLEM — M25.571 CHRONIC PAIN OF RIGHT ANKLE: Status: ACTIVE | Noted: 2020-04-28

## 2020-04-28 PROBLEM — G89.29 CHRONIC PAIN OF RIGHT ANKLE: Status: ACTIVE | Noted: 2020-04-28

## 2020-04-28 PROBLEM — Z86.010 H/O COLONOSCOPY WITH POLYPECTOMY: Status: ACTIVE | Noted: 2020-04-28

## 2020-04-28 PROBLEM — Z86.0100 H/O COLONOSCOPY WITH POLYPECTOMY: Status: ACTIVE | Noted: 2020-04-28

## 2020-04-28 PROBLEM — M25.562 ACUTE PAIN OF LEFT KNEE: Status: ACTIVE | Noted: 2020-04-28

## 2020-04-28 PROCEDURE — 99214 OFFICE O/P EST MOD 30 MIN: CPT | Performed by: FAMILY MEDICINE

## 2020-04-28 RX ORDER — BLOOD PRESSURE TEST KIT
KIT MISCELLANEOUS
Qty: 1 KIT | Refills: 0 | Status: SHIPPED | OUTPATIENT
Start: 2020-04-28

## 2020-04-28 ASSESSMENT — ENCOUNTER SYMPTOMS
VOMITING: 0
BLOOD IN STOOL: 0
CONSTIPATION: 0
COUGH: 0
SINUS PAIN: 0
ABDOMINAL DISTENTION: 0
SHORTNESS OF BREATH: 0
COLOR CHANGE: 0

## 2020-04-28 NOTE — PROGRESS NOTES
reflux 3/30/2016    Headache(784.0)     Hepatitis     Hyperlipidemia 2018    Hypertension     IBS (irritable bowel syndrome) 2014    Insomnia     Iron deficiency anemia     Mild single current episode of major depressive disorder ([facility] [address] 2018    Pancreatitis    ,   Past Surgical History:   Procedure Laterality Date    ANKLE SURGERY  14    HAND SURGERY  13    [address]   ,   Social History     Tobacco Use    Smoking status: Former Smoker     Types: Cigars     Last attempt to quit: 2018     Years since quittin.4    Smokeless tobacco: Never Used    Tobacco comment: quit    Substance Use Topics    Alcohol use: No     Alcohol/week: 0.0 standard drinks     Comment: quit ; prev daily drinking     Drug use: No   ,   Family History   Problem Relation Age of Onset    Hypertension Mother     Diabetes Mother    ,   Immunization History   Administered Date(s) Administered    Influenza Vaccine, unspecified formulation 10/26/2012    Influenza Virus Vaccine 10/27/2014, 10/19/2015    Influenza, Quadv, IM, PF (6 mo and older Fluzone, Flulaval, Fluarix, and 3 yrs and older Afluria) 2016, 2018    Pneumococcal Polysaccharide ([id number]) 2018    Tdap (Boostrix, Adacel) 2018       PHYSICAL EXAMINATION:  [ INSTRUCTIONS:  \"[x]\" Indicates a positive item  \"[]\" Indicates a negative item  -- DELETE ALL ITEMS NOT EXAMINED]  Vital Signs: (As obtained by patient/caregiver or practitioner observation)    Blood pressure-  Heart rate-    Respiratory rate-    Temperature-  Pulse oximetry-     Constitutional: [x] Appears well-developed and well-nourished [x] No apparent distress      [] Abnormal-   Mental status  [x] Alert and awake  [x] Oriented to person/place/time []Able to follow commands      Eyes:  EOM    [x]  Normal  [] Abnormal-  Sclera  [x]  Normal  [] Abnormal -         Discharge []  None visible  [] Abnormal -    HENT:   [x] Normocephalic, atraumatic. [] Abnormal   [x] Mouth/Throat: Mucous membranes are moist.     External Ears [x] Normal  [] Abnormal-     Neck: [x] No visualized mass     Pulmonary/Chest: [x] Respiratory effort normal.  [x] No visualized signs of difficulty breathing or respiratory distress        [] Abnormal-      Musculoskeletal:   [] Normal gait with no signs of ataxia  ,        [] Normal range of motion of neck        [x] Abnormal-pain during walking      Neurological:        [x] No Facial Asymmetry (Cranial nerve 7 motor function) (limited exam to video visit)          [x] No gaze palsy        [] Abnormal-         Skin:        [x] No significant exanthematous lesions or discoloration noted on facial skin         [] Abnormal-            Psychiatric:       [x] Normal Affect [] No Hallucinations        [] Abnormal-     Other pertinent observable physical exam findings-     ASSESSMENT/PLAN:  1. Essential hypertension  Controlled in the past, continue same medications monitor blood pressure at home  - Blood Pressure KIT; Dx: HTN. Needs automatic blood pressure machine to monitor his blood pressure. Dispense: 1 kit; Refill: 0    2. Mild single current episode of major depressive disorder ([address]  Stable continue same medications    3. Mixed hyperlipidemia  Continue lifestyle modifications discussed that will not help we will start on low-dose Lipitor    4. Acute pain of left knee  X-ray knee knee brace take NSAIDs as needed  - XR KNEE LEFT (1-2 VIEWS); Future  - Elastic Bandages & Supports (KNEE BRACE/HINGED/LARGE) MISC; Use daily for knee pain  Dispense: 1 each; Refill: 0    5. H/O colonoscopy with polypectomy  Referral placed  - Maycol Wiggins MD, Gastroenterology, [place]    6. Chronic pain of right ankle  Referral placed to podiatrist  - [address] [facility] [place]note 2], [facility] [address], [address], [address]    7.  Encounter for screening colonoscopy    - Maycol Wiggins MD, Gastroenterology, [place]      No follow-ups on file.    Doug Allan is a 39 y.o. male being evaluated by a Virtual Visit (video visit) encounter to address concerns as mentioned above. A caregiver was present when appropriate. Due to this being a TeleHealth encounter ([facility] public health emergency), evaluation of the following organ systems was limited: Vitals/Constitutional/EENT/Resp/CV/GI//MS/Neuro/Skin/Heme-Lymph-Imm. Pursuant to the emergency declaration under the [address] [place] [address] and the [employer] Act, this Virtual Visit was conducted with patient's (and/or legal guardian's) consent, to reduce the patient's risk of exposure to COVID-19 and provide necessary medical care. The patient (and/or legal guardian) has also been advised to contact this office for worsening conditions or problems, and seek emergency medical treatment and/or call 911 if deemed necessary. Patient identification was verified at the start of the visit: Yes    Total time spent on this encounter: 22    Services were provided through a video synchronous discussion virtually to substitute for in-person clinic visit. Patient and provider were located at their individual homes. --Emanuel Zaldivar MD on 4/28/2020 at 3:53 PM    An electronic signature was used to authenticate this note.

## 2020-04-29 ENCOUNTER — TELEPHONE (OUTPATIENT)
Dept: FAMILY MEDICINE CLINIC | Age: 45
End: 2020-04-29

## 2020-05-08 ENCOUNTER — OFFICE VISIT (OUTPATIENT)
Dept: PODIATRY | Age: 45
End: 2020-05-08
Payer: COMMERCIAL

## 2020-05-08 VITALS — BODY MASS INDEX: 29.66 KG/M2 | WEIGHT: 189 LBS | HEIGHT: 67 IN

## 2020-05-08 PROCEDURE — 99213 OFFICE O/P EST LOW 20 MIN: CPT | Performed by: PODIATRIST

## 2020-05-08 ASSESSMENT — ENCOUNTER SYMPTOMS
DIARRHEA: 0
SHORTNESS OF BREATH: 0
COLOR CHANGE: 0
BACK PAIN: 0
NAUSEA: 0

## 2020-05-08 NOTE — PROGRESS NOTES
[facility]  Return Patient Progress Note    Subjective: Ac Ren 39 y.o. male that presents with pain to the right ankle. Chief Complaint   Patient presents with    Ankle Pain     right ankle pain for the past month     Patient states that this has been present for about one month. Patient states that in 2014 he broke his ankle and he has screws in it. Patient states that he works on his feet all day and gets pain to the right ankle with either standing or moving it just right. Patient states that the ankle also gets numb and the pain gets worse by the end of the day. Pain is rated 7 out of 10 and is described as intermittent. Patient denies any treatment for this prior to today. Review of Systems   Constitutional: Negative for activity change, appetite change, chills, diaphoresis, fatigue and fever. Respiratory: Negative for shortness of breath. Cardiovascular: Negative for leg swelling. Gastrointestinal: Negative for diarrhea and nausea. Endocrine: Negative for cold intolerance, heat intolerance and polyuria. Musculoskeletal: Positive for joint swelling. Negative for arthralgias, back pain, gait problem and myalgias. Skin: Negative for color change, pallor, rash and wound. Allergic/Immunologic: Negative for environmental allergies and food allergies. Neurological: Negative for dizziness, weakness, light-headedness and numbness. Hematological: Does not bruise/bleed easily. Psychiatric/Behavioral: Negative for behavioral problems, confusion and self-injury. The patient is not nervous/anxious. Objective: Clinical evaluation of the patient reveals pain with palpation to the medial edge of the tibia just proximal to the ankle joint. There is a palpable nodule noted to this area, possibly a screw or other internal fixation. There is mild edema noted to the right ankle. There is no erythema, calor, or open wound noted to the medial tibia of the right ankle.  There is no pain with range of motion to the right ankle. Muscle strength is +5/5 to all four muscle groups of the right lower extremity. There is no pain with this manual muscle testing. Anterior draw sign is negative. There is no pain with this maneuver. X-ray's taken: AP, Lateral, and Medial Oblique of the right ankle. Findings: There is no prominent screw or other hardware noted to the tibia. There is no fracture or stress fracture noted. There is no loss or incongruity noted to the ankle joint. Assessment:    Diagnosis Orders   1. Painful scar     2. Painful orthopaedic hardware (HCC)  XR ANKLE RIGHT (MIN 3 VIEWS)   3. Edema of lower extremity  XR ANKLE RIGHT (MIN 3 VIEWS)   4. Acute right ankle pain  XR ANKLE RIGHT (MIN 3 VIEWS)         Plan: 1. Clinical evaluation of the patient. 2. Patient informed that it appears that he has some painful scar tissue to this area. Patient advised to utilize vitamin E oil and deep tissue massage to try and bring this down. 3. Return if symptoms worsen or fail to improve.    5/8/2020      Tyler Munoz DPM

## 2020-05-15 ENCOUNTER — TELEPHONE (OUTPATIENT)
Dept: GASTROENTEROLOGY | Age: 45
End: 2020-05-15

## 2020-05-16 ENCOUNTER — HOSPITAL ENCOUNTER (OUTPATIENT)
Dept: GENERAL RADIOLOGY | Age: 45
Discharge: HOME | End: 2020-05-18
Payer: COMMERCIAL

## 2020-05-16 ENCOUNTER — HOSPITAL ENCOUNTER (OUTPATIENT)
Age: 45
Discharge: HOME | End: 2020-05-18
Payer: COMMERCIAL

## 2020-05-16 PROCEDURE — 73560 X-RAY EXAM OF KNEE 1 OR 2: CPT

## 2020-05-18 ENCOUNTER — TELEPHONE (OUTPATIENT)
Dept: FAMILY MEDICINE CLINIC | Age: 45
End: 2020-05-18

## 2020-06-03 ENCOUNTER — OFFICE VISIT (OUTPATIENT)
Dept: ORTHOPEDIC SURGERY | Age: 45
End: 2020-06-03
Payer: COMMERCIAL

## 2020-06-03 VITALS — BODY MASS INDEX: 28.88 KG/M2 | TEMPERATURE: 97.4 F | HEIGHT: 67 IN | WEIGHT: 184 LBS

## 2020-06-03 PROCEDURE — 99203 OFFICE O/P NEW LOW 30 MIN: CPT | Performed by: PHYSICIAN ASSISTANT

## 2020-06-03 RX ORDER — METHYLPREDNISOLONE 4 MG/1
4 TABLET ORAL SEE ADMIN INSTRUCTIONS
Qty: 1 KIT | Refills: 0 | Status: SHIPPED | OUTPATIENT
Start: 2020-06-03 | End: 2020-06-09

## 2020-06-03 NOTE — PROGRESS NOTES
Orthopedic Knee Encounter Note     Chief complaint: Left knee pain    HPI: Enrique Prado is a 39 y.o. male who presents for for evaluation of left knee pain. Patient states he has had intermittent pain in this left knee over the past several years however it has been quite an issue over the past 1 to 2 months. Pain is most severe to the anterior and medial aspect of the left knee. Patient states he works a very physical job as a  requiring him to lift, crouch, squat etc. often which aggravates his knee pain. He states he also attempts to exercise regularly including running which he has having great difficulty with due to the pain in his left knee. Pain is not only aggravated by exercise but also with twisting and turning the knee the wrong way. Patient states he notes significant popping if he moves the knee the wrong way as well. He had an x-ray ordered by his PCP which showed no acute findings however did show evidence for an old MCL injury. Patient denies any known injury that would contribute to this evidence. He has never had surgery on this left knee. He is currently wearing a hinged knee brace which he states does provide support and helps improve his pain mildly. Previous treatment:    NSAIDs: OTC Ibuprofen    Injections:  None    Physical therapy: None    Surgeries: None    Review of Systems:     Constitution: no fever or chills   Pain level: 7/10  Musculoskeletal: As noted in the HPI   Neurologic: no neurologic symptoms    Past Medical History  Adan Castro  has a past medical history of Allergic rhinitis, Anxiety, Cervical spine pain, Gastric reflux, Headache(784.0), Hepatitis, Hyperlipidemia, Hypertension, IBS (irritable bowel syndrome), Insomnia, Iron deficiency anemia, Mild single current episode of major depressive disorder ([facility] [address] and Pancreatitis. Past Surgical History  Adan Castro  has a past surgical history that includes Ankle surgery (02/23/14);  Hand surgery (08/02/13); and Urethra surgery (1985). Current Medications  Current Outpatient Medications   Medication Sig Dispense Refill    methylPREDNISolone (MEDROL DOSEPACK) 4 MG tablet Take 1 tablet by mouth See Admin Instructions for 6 days Take by mouth. 1 kit 0    Elastic Bandages & Supports (KNEE BRACE/HINGED/LARGE) MISC Use daily for knee pain 1 each 0    Blood Pressure KIT Dx: HTN. Needs automatic blood pressure machine to monitor his blood pressure. 1 kit 0    Ciclopirox (LOPROX) 0.77 % gel Apply in between toes twice daily. 1 Tube 3    losartan (COZAAR) 50 MG tablet TAKE ONE TABLET BY MOUTH DAILY 90 tablet 1    Cholecalciferol (VITAMIN D3) 1000 units TABS Take 1 tablet by mouth daily 90 tablet 1    cetirizine (ZYRTEC) 10 MG tablet Take 1 tablet by mouth daily 30 tablet 1    clotrimazole-betamethasone (LOTRISONE) 1-0.05 % cream Apply topically 2 times daily. 45 g 0    citalopram (CELEXA) 10 MG tablet Take 1 tablet by mouth daily 30 tablet 3    melatonin 5 MG TABS tablet Take 5 mg by mouth daily       No current facility-administered medications for this visit. Allergies  Allergies have been reviewed. Johnnie Santiago has No Known Allergies. Social History  Johnnie Santiago  reports that he quit smoking about 18 months ago. His smoking use included cigars. He has never used smokeless tobacco. He reports that he does not drink alcohol or use drugs. Family History  John's family history includes Diabetes in his mother; Hypertension in his mother.      Physical Exam:     Temp 97.4 °F (36.3 °C)   Ht 5' 7\" (1.702 m)   Wt 184 lb (83.5 kg)   BMI 28.82 kg/m²    General Appearance: alert, well appearing, and in no distress  Mental Status: alert, oriented to person, place, and time  Gait: antalgic  Hips: Good pain-free ROM without crepitation  Lumbar spine: Normal    Knee: Bilateral    Skin: warm and dry, no rash or erythema  Vasculature: 2+ pedal pulses bilaterally  Neuro: Sensation grossly intact to light touch old male who presented to our clinic for evaluation of acute on chronic left knee pain.     MRI Left knee rule out MCL versus medial meniscus    Medrol dosepack electronically sent to pharmacy    Continue with hinged knee brace for support    Follow-up after MRI      NA = Not assessed  n = No  y = Yes  SLR = Straight leg raise  MCL = Medial collateral ligament  LCL = Lateral collateral ligament

## 2020-06-10 ENCOUNTER — HOSPITAL ENCOUNTER (OUTPATIENT)
Dept: MRI IMAGING | Age: 45
Discharge: HOME | End: 2020-06-12

## 2020-06-10 PROCEDURE — 73721 MRI JNT OF LWR EXTRE W/O DYE: CPT

## 2020-06-13 ENCOUNTER — HOSPITAL ENCOUNTER (EMERGENCY)
Age: 45
Discharge: HOME | End: 2020-06-13
Attending: EMERGENCY MEDICINE

## 2020-06-13 VITALS
HEIGHT: 67 IN | DIASTOLIC BLOOD PRESSURE: 93 MMHG | HEART RATE: 79 BPM | BODY MASS INDEX: 31.11 KG/M2 | SYSTOLIC BLOOD PRESSURE: 140 MMHG | TEMPERATURE: 98.1 F | RESPIRATION RATE: 16 BRPM | OXYGEN SATURATION: 99 % | WEIGHT: 198.2 LBS

## 2020-06-13 LAB
DIRECT EXAM: NORMAL
Lab: NORMAL
SPECIMEN DESCRIPTION: NORMAL

## 2020-06-13 PROCEDURE — 99282 EMERGENCY DEPT VISIT SF MDM: CPT

## 2020-06-13 PROCEDURE — 87880 STREP A ASSAY W/OPTIC: CPT

## 2020-06-13 RX ORDER — EPINEPHRINE 0.3 MG/.3ML
1 INJECTION SUBCUTANEOUS PRN
Qty: 2 EACH | Refills: 0 | Status: SHIPPED | OUTPATIENT
Start: 2020-06-13

## 2020-06-13 RX ORDER — PREDNISONE 50 MG/1
50 TABLET ORAL DAILY
Qty: 5 TABLET | Refills: 0 | Status: SHIPPED | OUTPATIENT
Start: 2020-06-13 | End: 2020-06-18

## 2020-06-13 ASSESSMENT — PAIN DESCRIPTION - FREQUENCY: FREQUENCY: CONTINUOUS

## 2020-06-13 ASSESSMENT — PAIN DESCRIPTION - LOCATION: LOCATION: THROAT

## 2020-06-13 ASSESSMENT — PAIN DESCRIPTION - DESCRIPTORS: DESCRIPTORS: TENDER

## 2020-06-13 ASSESSMENT — PAIN SCALES - GENERAL: PAINLEVEL_OUTOF10: 4

## 2020-06-13 NOTE — ED NOTES
Patient presents to the er with c/o sore throat that started this morning when he woke up. Patient denies fever, vitals stable.      Norman Jimenez RN  06/13/20 [id number]

## 2020-06-13 NOTE — ED PROVIDER NOTES
EMERGENCY DEPARTMENT ENCOUNTER    Pt Name: Ignacio Stevens  MRN: [id number]  [place] 1975  Date of evaluation: 6/13/20  CHIEF COMPLAINT       Chief Complaint   Patient presents with    Pharyngitis     onset this am     HISTORY OF PRESENT ILLNESS   Patient is a 49-year-old male who presents to the ED complaining of sore throat. Symptoms started yesterday. He is a , is been wearing a facemask and his seasonal allergies have been very active. No wheezing, shortness of breath, voice change, stridor. He takes Claritin with moderate relief. He does not have fever, urticaria, chest pain. No abdominal pain, nausea, vomiting, changes in urine stool. REVIEW OF SYSTEMS     Review of Systems   All other systems reviewed and are negative. PASTMEDICAL HISTORY     Past Medical History:   Diagnosis Date    Allergic rhinitis     Anxiety     Cervical spine pain 1/3/2019    Gastric reflux 3/30/2016    Headache(784.0)     Hepatitis     Hyperlipidemia 5/31/2018    Hypertension     IBS (irritable bowel syndrome) 12/17/2014    Insomnia     Iron deficiency anemia     Mild single current episode of major depressive disorder ([facility] [address] 8/2/2018    Pancreatitis      SURGICAL HISTORY       Past Surgical History:   Procedure Laterality Date    ANKLE SURGERY  02/23/14    HAND SURGERY  08/02/13    [address] [facility] [address]     CURRENT MEDICATIONS       Discharge Medication List as of 6/13/2020 12:02 PM      CONTINUE these medications which have NOT CHANGED    Details   Loratadine (CLARITIN PO) Take by mouth dailyHistorical Med      Ciclopirox (LOPROX) 0.77 % gel Apply in between toes twice daily. , Disp-1 Tube, R-3, Normal      losartan (COZAAR) 50 MG tablet TAKE ONE TABLET BY MOUTH DAILY, Disp-90 tablet, R-1Normal      Cholecalciferol (VITAMIN D3) 1000 units TABS Take 1 tablet by mouth daily, Disp-90 tablet, R-1Normal      clotrimazole-betamethasone (LOTRISONE) 1-0.05 % cream Apply topically 2 times daily. , Disp-45

## 2020-06-15 ENCOUNTER — TELEPHONE (OUTPATIENT)
Dept: FAMILY MEDICINE CLINIC | Age: 45
End: 2020-06-15

## 2020-06-15 ENCOUNTER — TELEPHONE (OUTPATIENT)
Dept: ORTHOPEDIC SURGERY | Age: 45
End: 2020-06-15

## 2020-06-15 ENCOUNTER — CARE COORDINATION (OUTPATIENT)
Dept: CARE COORDINATION | Age: 45
End: 2020-06-15

## 2020-06-15 NOTE — CARE COORDINATION
Patient contacted regarding recent discharge and COVID-19 risk. Discussed COVID-19 related testing which was not done at this time. Test results were not done. Patient informed of results, if available?      Care Transition Nurse/ Ambulatory Care Manager contacted the patient by telephone to perform post discharge assessment. Verified name and  with patient as identifiers. Patient has following risk factors of: no known risk factors. CTN/ACM reviewed discharge instructions, medical action plan and red flags related to discharge diagnosis. Reviewed and educated them on any new and changed medications related to discharge diagnosis. Advised obtaining a 90-day supply of all daily and as-needed medications. Education provided regarding infection prevention, and signs and symptoms of COVID-19 and when to seek medical attention with patient who verbalized understanding. Discussed exposure protocols and quarantine from [address] you at higher risk for severe illness  and given an opportunity for questions and concerns. The patient agrees to contact the COVID-19 hotline [phone] or PCP office for questions related to their healthcare. CTN/ACM provided contact information for future reference. From CDC: Are you at higher risk for severe illness?  Wash your hands often.  Avoid close contact (6 feet, which is about two arm lengths) with people who are sick.  Put distance between yourself and other people if COVID-19 is spreading in your community.  Clean and disinfect frequently touched surfaces.  Avoid all cruise travel and non-essential air travel.  Call your healthcare professional if you have concerns about COVID-19 and your underlying condition or if you are sick. For more information on steps you can take to protect yourself, see CDC's How to Protect Yourself    Pt will be further monitored by COVID Loop Team based on severity of symptoms and risk factors.   Patient feeling better, did  prednisone and epipen from pharmacy.

## 2020-06-23 ENCOUNTER — HOSPITAL ENCOUNTER (OUTPATIENT)
Dept: PHYSICAL THERAPY | Facility: CLINIC | Age: 45
Setting detail: THERAPIES SERIES
Discharge: HOME | End: 2020-06-23
Payer: COMMERCIAL

## 2020-06-23 PROCEDURE — 97161 PT EVAL LOW COMPLEX 20 MIN: CPT

## 2020-06-23 PROCEDURE — 97110 THERAPEUTIC EXERCISES: CPT

## 2020-06-23 NOTE — CONSULTS
[] [address]  [address]  [phone]  F: [phone] [x] [address]  [address]   [address]  P: [phone]  F: [phone] [] [place]  [address]  P: [phone]  F: [phone] [] [address]  [facility]   [address]   [place]: [phone]  F: [phone]      Physical Therapy Lower Extremity Evaluation    Date:  2020  Patient: Ignacio Stevens  : 1975  MRN: [id number]  Physician: SULMA Philip   Insurance: [facility], unknown visit count? ? Medical Diagnosis:   M25.462 (ICD-10-CM) - Effusion of left knee   S83.412A (ICD-10-CM) - Grade 1 injury of medial collateral ligament of left knee   Rehab Codes: M25.562, M25.662  Onset date: 2020  Next Dr's appt. : to be scheduled as needed     Subjective:   CC: left knee pain      HPI: Pt reports several year history of left knee pain however notes that pain has progressively worsened over the last 2 months. Pt reports majority of pain at anterior-medial joint line. Pt wears don wilda brace this date and states it does help stabilize the knee and decrease pain. Pt notes without the brace he has instability/buckling with stair navigation. 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 Pt states he works a physically demanding job where he is up/down on his knees and jumping in/out of his truck. On top of work, patient also works out intensely and runs (has not worked out since onset of increased pain). Pt states squatting, stair climbing, and working out increases his pain. Pt took prednisone with some pain relief; also uses ice and heat with good relief.       PMHx: [x] HTN  [x] Other: tibia and ankle fx in 2014              [x] Refer to full medical chart  In EPIC       Comorbidities: N/A    Tests: [x] MRI:  Impression Evidence of prior intermediate grade MCL sprain.  No edema to suggest acute   injury.       Findings suggest patellofemoral maltracking.  No significant patellofemoral   cartilage degeneration is identified at this time. Medications: [x] Refer to full medical record   Allergies:  [x] None     Function:    [employer]er [employer]    Job Status [x]  Normal duty      Work activities/duties Landscaping      Pain:  [x] Yes  [] No Location: left knee Pain Rating: (0-10 scale) 6/10 on average     Symptoms:   [x] Worsening   Better:    [x] Sit    [x] Lying    Worse:  [x] prolonged Sit    [x] Rise/Sit    [x]Stand    [x] Walk   [x] Bend                        Sleep: [x] OK        Objective:    ROM  ° A/P STRENGTH TESTS (+/-) Left Right Not Tested    Left Right Left Right Ant.  Drawer   []   Hip Flex   4+ 5 Post. Drawer   []   Ext   4 4+ Lachmans   []   ER   5 5 Valgus Stress   []   IR     Varus Stress   []   ABD   4 5 Pepes   []   ADD   4+ 5 Apleys Comp.   []   Knee Flex 125 125 4+ 5 Apleys Dist.   []   Ext -3 0 4 5 Hip Scouring   []   Ankle DF   5 5 TAMARAs   []   PF   4- 5 Piriformis   []   INV     Cryss   []   EVER     Talor Tilt   []        Pat-Fem Grind -  []   Able to complete 10 L single limb heel raises before significant compensation     (-) patellar apprehension     Bilaterally tight HS   Left piriformis tight    Crepitus noted with repeated L knee flex/ext and occasionally with transfers     OBSERVATION No Deficit Deficit Not Tested Comments   Posture       Genu Valgus [x] [] []    Genu Varus [x] [] []    Genu Recurvatum [x] [] []    Pronation [x] [] []    Supination [x] [] []    Leg Length Discrp [] [] [x]    Slumped Sitting [] [x] []    Palpation [] [x] [] TTP lateral patella    Sensation [x] [] []    Edema [x] [] []    Neurological [] [] [x]    Patellar Mobility [] [] []    Patellar Orientation [] [] []    Gait [x] [] [] Analysis: Pt states he feels like he walks abnormally however no notable demo ability to squat with proper knee alignment and no reports of increased pain for return to work duties with ease  iii. Pt will demo ability to complete 10x heel taps with proper form and no reports of pain to demo improved quadriceps control   3. Patient to be independent with home exercise program as demonstrated by performance with correct form without cues. 4. Demonstrate Knowledge of fall prevention  LTG: (to be met in 12 treatments)  1. <2/10 average left knee pain for improved QOL  2. Pt will improve left knee ext and hip abd strength to 4+/5 for improved stability during stair navigation  3. Pt will demo ability to complete 15x heel raises on the LLE to demo improvements in PF strength  4. Pt will demo ability to ascend/descend a flight of stairs with proper form and no reports of pain for improved mobility around the home                  Patient goals: decrease pain     Rehab Potential:  [x] Good  [] Fair  [] Poor   Suggested Professional Referral:  [x] No  [] Yes:  Barriers to Goal Achievement:  [x] No  [] Yes:  Domestic Concerns:  [x] No  [] Yes:    Pt. Education:  [x] Plans/Goals, Risks/Benefits discussed  [x] Home exercise program    Method of Education: [x] Verbal  [x] Demo  [x] Written  Comprehension of Education:  [x] Verbalizes understanding. [x] Demonstrates understanding. [x] Needs Review. [] Demonstrates/verbalizes understanding of HEP/Ed previously given.     Treatment Plan:  [x] Therapeutic Exercise   67252  [] Iontophoresis: 4 mg/mL Dexamethasone Sodium Phosphate  mAmin  45820   [x] Therapeutic Activity  60803 [] Vasopneumatic cold with compression  86334    [] Gait Training   17267 [] Ultrasound   76770   [x] Neuromuscular Re-education  65302 [] Electrical Stimulation Unattended  17287   [x] Manual Therapy  46291 [] Electrical Stimulation Attended  52828   [x] Instruction in HEP  [] Lumbar/Cervical Traction  36903   [] Aquatic Therapy   16982 [x] Cold/hotpack    [] Massage 10445      [] Dry Needling, 1 or 2 muscles  41550   [] Biofeedback, first 15 minutes   16093  [] Biofeedback, additional 15 minutes   99905 [] Dry Needling, 3 or more muscles  58583     []  Medication allergies reviewed for use of    Dexamethasone Sodium Phosphate 4mg/ml     with iontophoresis treatments. Pt is not allergic. Frequency:  2 x/week for 12 visits        Todays Treatment:  Modalities:   Precautions:  Exercises:  Exercise Reps/ Time Weight/ Level Comments   HS stretch 3x30\"     Piriformis stretch 3x30\"     Calf stretch 3x30\"           Frog bridges 15x blue    SLR 15x blue          clamshells 15x blue    Hip abduction 15x blue          TKE 15x5\" blue    Lateral band walk x blue          Heel taps NEXT     L single limb STS NEXT     Bear stance  NEXT     Other:    Specific Instructions for next treatment: Focus on concentric and eccentric quadriceps control. Hip abd, knee ext, PF strengthening. Hip/knee stretching. Evaluation Complexity:  History (Personal factors, comorbidities) [] 0 [x] 1-2 [] 3+   Exam (limitations, restrictions) [x] 1-2 [] 3 [] 4+   Clinical presentation (progression) [x] Stable [] Evolving  [] Unstable   Decision Making [x] Low [] Moderate [] High    [x] Low Complexity [] Moderate Complexity [] High Complexity       Treatment Charges: Mins Units   [x] Evaluation       [x]  Low       []  Moderate       []  High 35 1   []  Modalities     [x]  Ther Exercise 20 1   []  Manual Therapy     []  Ther Activities     []  Aquatics     []  Vasocompression     []  Other       TOTAL TREATMENT TIME: 55 min    Time in: 6:05 pm   Time Out: 7:05 pm    Electronically signed by: Steve Murillo PT        Physician Signature:________________________________Date:__________________  By signing above or cosigning this note, I have reviewed this plan of care and certify a need for medically necessary rehabilitation services.      *PLEASE SIGN ABOVE AND FAX BACK ALL PAGES*

## 2020-06-25 ENCOUNTER — HOSPITAL ENCOUNTER (OUTPATIENT)
Dept: PHYSICAL THERAPY | Facility: CLINIC | Age: 45
Setting detail: THERAPIES SERIES
Discharge: HOME | End: 2020-06-25
Payer: COMMERCIAL

## 2020-07-02 ENCOUNTER — HOSPITAL ENCOUNTER (OUTPATIENT)
Dept: PHYSICAL THERAPY | Facility: CLINIC | Age: 45
Setting detail: THERAPIES SERIES
Discharge: HOME | End: 2020-07-02
Payer: COMMERCIAL

## 2020-07-02 NOTE — FLOWSHEET NOTE
[] [address]  [address]    [phone]  F: [phone]   [x] [address] [facility] [address]   [address]  P: [phone]  F: [phone]  [] [place]  [address] [facility]  P: [phone]  F: [phone]  [] [address]  [facility]   [address]   [place]: [phone]  F: [phone]   [] [facility]  [address] [facility] [address]  [place]: [phone]   F: [phone]     Physical Therapy Cancel/No Show note    Date: 2020  Patient: Cadence Dinh  : 1975  MRN: [id number]    Cancels/No Shows to date:    For today's appointment patient:    [x]  Cancelled    [] Rescheduled appointment    [] No-show     Reason given by patient:    []  Patient ill    []  Conflicting appointment    [] No transportation      [] Conflict with work    [] No reason given    [] Weather related    [] COVID-19    [x] Other:      Comments:  Pending confirmation on insurance eligibility, will call back to schedule.        [] Next appointment was confirmed    Electronically signed by: Helene Arzola PTA

## 2020-08-18 NOTE — DISCHARGE SUMMARY
[] [facility]        Outpatient Physical                Therapy       [address]       Phone: [phone]       Fax: [phone] [x] [facility]       Promotion at [address] [facility]       Phone: [phone]       Fax: [phone] [] [facility] [place]      for Health Promotion     [address] [facility]     Phone: [phone]     Fax:  [phone]     Physical Therapy Discharge Note    Date: 2020      Patient: Tre Olea  : 1975  MRN: [id number]    Physician: SULMA Aguero                                 Insurance: [facility]  Medical Diagnosis:   R02.430 (ICD-10-CM) - Effusion of left knee   S83.412A (ICD-10-CM) - Grade 1 injury of medial collateral ligament of left knee   Rehab Codes: M25.562, M25.662  Onset date: 2020                       Next 's appt. : to be scheduled as needed   Total visits attended: 1  Cancels/No shows: 2/0 due to insurance issues   Date of initial visit: 20             Date of final visit: 20       Discharge Status:     Pt failed to make additional appointments for therapy due to insurance coverage issues. Pt. Is now discharged. Electronically signed by: Reynaldo Zavala PT    If you have any questions or concerns, please don't hesitate to call.   Thank you for your referral.

## 2020-09-11 ENCOUNTER — HOSPITAL ENCOUNTER (EMERGENCY)
Age: 45
Discharge: HOME | End: 2020-09-11
Attending: EMERGENCY MEDICINE
Payer: MEDICARE

## 2020-09-11 VITALS
RESPIRATION RATE: 16 BRPM | TEMPERATURE: 98.4 F | OXYGEN SATURATION: 97 % | BODY MASS INDEX: 31.39 KG/M2 | DIASTOLIC BLOOD PRESSURE: 78 MMHG | SYSTOLIC BLOOD PRESSURE: 145 MMHG | HEIGHT: 67 IN | WEIGHT: 200 LBS | HEART RATE: 82 BPM

## 2020-09-11 PROCEDURE — 96372 THER/PROPH/DIAG INJ SC/IM: CPT

## 2020-09-11 PROCEDURE — 99282 EMERGENCY DEPT VISIT SF MDM: CPT

## 2020-09-11 PROCEDURE — 6360000002 HC RX W HCPCS: Performed by: EMERGENCY MEDICINE

## 2020-09-11 RX ORDER — DEXAMETHASONE SODIUM PHOSPHATE 10 MG/ML
INJECTION INTRAMUSCULAR; INTRAVENOUS
Status: DISCONTINUED
Start: 2020-09-11 | End: 2020-09-11 | Stop reason: HOSPADM

## 2020-09-11 RX ORDER — PREDNISOLONE SODIUM PHOSPHATE 10 MG/ML
1 SOLUTION/ DROPS OPHTHALMIC 4 TIMES DAILY
COMMUNITY
End: 2021-09-13 | Stop reason: ALTCHOICE

## 2020-09-11 RX ORDER — DEXAMETHASONE SODIUM PHOSPHATE 10 MG/ML
10 INJECTION INTRAMUSCULAR; INTRAVENOUS ONCE
Status: COMPLETED | OUTPATIENT
Start: 2020-09-11 | End: 2020-09-11

## 2020-09-11 RX ORDER — CETIRIZINE HYDROCHLORIDE 10 MG/1
1 CAPSULE, LIQUID FILLED ORAL DAILY
Qty: 20 CAPSULE | Refills: 0 | Status: SHIPPED | OUTPATIENT
Start: 2020-09-11 | End: 2021-01-06 | Stop reason: SDUPTHER

## 2020-09-11 RX ORDER — AZITHROMYCIN 250 MG/1
TABLET, FILM COATED ORAL
Qty: 1 PACKET | Refills: 0 | Status: SHIPPED | OUTPATIENT
Start: 2020-09-11 | End: 2021-01-13 | Stop reason: ALTCHOICE

## 2020-09-11 RX ORDER — PREDNISONE 10 MG/1
TABLET ORAL
Qty: 30 TABLET | Refills: 0 | Status: SHIPPED | OUTPATIENT
Start: 2020-09-11 | End: 2021-01-13 | Stop reason: ALTCHOICE

## 2020-09-11 RX ADMIN — DEXAMETHASONE SODIUM PHOSPHATE 10 MG: 10 INJECTION INTRAMUSCULAR; INTRAVENOUS at 09:03

## 2020-09-11 ASSESSMENT — ENCOUNTER SYMPTOMS
SHORTNESS OF BREATH: 0
FACIAL SWELLING: 0
DIARRHEA: 0
CONSTIPATION: 0
COLOR CHANGE: 0
EYE DISCHARGE: 0
EYE REDNESS: 0
COUGH: 0
ABDOMINAL PAIN: 0
VOMITING: 0

## 2020-09-11 NOTE — ED PROVIDER NOTES
[address] [place] [address] ED  EMERGENCY DEPARTMENT ENCOUNTER      Pt Name: Richelle German  MRN: [id number]  [place] 1975  Date of evaluation: 9/11/2020  Provider: Nils Maharaj MD    CHIEF COMPLAINT       Chief Complaint   Patient presents with    Pharyngitis     swelling to uvula and throat         HISTORY OF PRESENT ILLNESS  (Location/Symptom, Timing/Onset, Context/Setting, Quality, Duration, Modifying Factors, Severity.)   Richelle German is a 39 y.o. male who presents to the emergency department for swelling of his uvula. It seemed to start last night and was worse today. He has had this at least once before after he had been cutting grass but he was not cutting grass at this time. This time he does not know what caused it. No fever cough or difficulty breathing. He never saw a specialist about it. He states he would like to get his adenoids out. No fever chest pain cough or shortness of breath. Denies any pain. Nursing Notes were reviewed. ALLERGIES     Patient has no known allergies. CURRENT MEDICATIONS       Previous Medications    BLOOD PRESSURE KIT    Dx: HTN. Needs automatic blood pressure machine to monitor his blood pressure. CHOLECALCIFEROL (VITAMIN D3) 1000 UNITS TABS    Take 1 tablet by mouth daily    CICLOPIROX (LOPROX) 0.77 % GEL    Apply in between toes twice daily. CITALOPRAM (CELEXA) 10 MG TABLET    Take 1 tablet by mouth daily    CLOTRIMAZOLE-BETAMETHASONE (LOTRISONE) 1-0.05 % CREAM    Apply topically 2 times daily.     ELASTIC BANDAGES & SUPPORTS (KNEE BRACE/HINGED/LARGE) MISC    Use daily for knee pain    EPINEPHRINE (EPIPEN) 0.3 MG/0.3ML SOAJ INJECTION    Inject 1 mL into the muscle as needed (Anaphylaxis) Use as directed for allergic reaction    LORATADINE (CLARITIN PO)    Take by mouth daily    LOSARTAN (COZAAR) 50 MG TABLET    TAKE ONE TABLET BY MOUTH DAILY    MELATONIN 5 MG TABS TABLET    Take 5 mg by mouth daily    PREDNISOLONE SODIUM PHOSPHATE (INFLAMASE FORTE) 1 % OPHTHALMIC SOLUTION    1 drop 4 times daily       PAST MEDICAL HISTORY         Diagnosis Date    Allergic rhinitis     Anxiety     Cervical spine pain 1/3/2019    Gastric reflux 3/30/2016    Headache(784.0)     Hepatitis     Hyperlipidemia 5/31/2018    Hypertension     IBS (irritable bowel syndrome) 12/17/2014    Insomnia     Iron deficiency anemia     Mild single current episode of major depressive disorder ([facility] [address] 8/2/2018    Pancreatitis     Seasonal allergies        SURGICAL HISTORY           Procedure Laterality Date    ANKLE SURGERY  02/23/14    HAND SURGERY  08/02/13    URETHRA SURGERY  1985         FAMILY HISTORY           Problem Relation Age of Onset    Hypertension Mother     Diabetes Mother      Family Status   Relation Name Status    Mother  Alive    Father  Alive        SOCIAL HISTORY      reports that he quit smoking about 21 months ago. His smoking use included cigars. He has never used smokeless tobacco. He reports current alcohol use. He reports that he does not use drugs. REVIEW OF SYSTEMS    (2-9 systems for level 4, 10 or more for level 5)     Review of Systems   Constitutional: Negative for chills, fatigue and fever. HENT: Negative for congestion, ear discharge and facial swelling. Eyes: Negative for discharge and redness. Respiratory: Negative for cough and shortness of breath. Cardiovascular: Negative for chest pain. Gastrointestinal: Negative for abdominal pain, constipation, diarrhea and vomiting. Genitourinary: Negative for dysuria and hematuria. Musculoskeletal: Negative for arthralgias. Skin: Negative for color change and rash. Neurological: Negative for syncope, numbness and headaches. Hematological: Negative for adenopathy. Psychiatric/Behavioral: Negative for confusion. The patient is not nervous/anxious. Except as noted above the remainder of the review of systems was reviewed and negative.      PHYSICAL EXAM    (up to 7 for level 4, 8 or more for level 5)     Vitals:    09/11/20 0826   BP: (!) 145/78   Pulse: 82   Resp: 16   Temp: 98.4 °F (36.9 °C)   TempSrc: Oral   SpO2: 97%   Weight: 200 lb (90.7 kg)   Height: 5' 7\" (1.702 m)       Physical Exam  Vitals signs reviewed. Constitutional:       General: He is not in acute distress. Appearance: He is well-developed. He is not diaphoretic. HENT:      Head: Normocephalic and atraumatic. Mouth/Throat:      Comments: Uvula is enlarged. No exudate. No swelling to the floor of his mouth. He is handling his oral secretions well. No cervical adenopathy. Eyes:      General: No scleral icterus. Right eye: No discharge. Left eye: No discharge. Neck:      Musculoskeletal: Neck supple. Cardiovascular:      Rate and Rhythm: Normal rate and regular rhythm. Pulmonary:      Effort: Pulmonary effort is normal. No respiratory distress. Breath sounds: Normal breath sounds. No stridor. No wheezing or rales. Abdominal:      General: There is no distension. Palpations: Abdomen is soft. Tenderness: There is no abdominal tenderness. Musculoskeletal: Normal range of motion. Lymphadenopathy:      Cervical: No cervical adenopathy. Skin:     General: Skin is warm and dry. Findings: No erythema or rash. Neurological:      Mental Status: He is alert and oriented to person, place, and time.    Psychiatric:         Behavior: Behavior normal.             DIAGNOSTIC RESULTS     EKG: All EKG's are interpreted by the Emergency Department Physician who either signs or Co-signs this chart in the absence of a cardiologist.    Not indicated    RADIOLOGY:   Non-plain film images such as CT, Ultrasound and MRI are read by the radiologist. Plain radiographic images are visualized and preliminarily interpreted by the emergency physician with the below findings:    Not indicated    Interpretation per the Radiologist below, if available at the time of this note:        LABS:  Labs Reviewed - No data to display    All other labs were within normal range or not returned as of this dictation. EMERGENCY DEPARTMENT COURSE and DIFFERENTIAL DIAGNOSIS/MDM:   Vitals:    Vitals:    09/11/20 0826   BP: (!) 145/78   Pulse: 82   Resp: 16   Temp: 98.4 °F (36.9 °C)   TempSrc: Oral   SpO2: 97%   Weight: 200 lb (90.7 kg)   Height: 5' 7\" (1.702 m)       Orders Placed This Encounter   Medications    dexamethasone (DECADRON) injection 10 mg    azithromycin (ZITHROMAX) 250 MG tablet     Sig: Take 2 tablets (500 mg) on Day 1, followed by 1 tablet (250 mg) once daily on Days 2 through 5. Dispense:  1 packet     Refill:  0    predniSONE (DELTASONE) 10 MG tablet     Sig: Take 4 by mouth daily for 3 days then  3 by mouth daily for 3 days then  2 by mouth daily for 3 days then  1 by mouth daily for 3 days     Dispense:  30 tablet     Refill:  0    Cetirizine HCl (ZYRTEC ALLERGY) 10 MG CAPS     Sig: Take 1 tablet by mouth daily     Dispense:  20 capsule     Refill:  0       Medical Decision Making: He will be placed on steroids and was given IM Decadron. He will follow-up with ENT. Treatment diagnosis and follow-up were discussed with the patient. CONSULTS:  None    PROCEDURES:  None    FINAL IMPRESSION      1. Uvulitis          DISPOSITION/PLAN   DISPOSITION Decision To Discharge 09/11/2020 08:35:21 AM      PATIENT REFERRED TO:   Malorie Head MD  [address] [facility] [address]  [address] [facility] [address] [phone]      As needed    [facility] ED  [address] [facility]  [phone]    If symptoms worsen    [address]   [address] [facility]  [phone]            DISCHARGE MEDICATIONS:     New Prescriptions    AZITHROMYCIN (ZITHROMAX) 250 MG TABLET    Take 2 tablets (500 mg) on Day 1, followed by 1 tablet (250 mg) once daily on Days 2 through 5.     CETIRIZINE HCL (ZYRTEC ALLERGY) 10 MG CAPS    Take 1 tablet by mouth

## 2020-09-14 ENCOUNTER — TELEPHONE (OUTPATIENT)
Dept: FAMILY MEDICINE CLINIC | Age: 45
End: 2020-09-14

## 2020-09-14 NOTE — TELEPHONE ENCOUNTER
[facility] ([facility]) ED Follow up Call            FU appts/Provider:    No future appointments. VOICEMAIL DOCUMENTATION - ERASE IF NOT USED  Hi, this message is for  Colletta Marshal  This is Janis from [employer] office. Just calling to see how you are doing after your recent visit to the Emergency Room. [employer] wants to make sure you were able to fill any prescriptions and that you understand your discharge instructions. Please return our call if you need to make a follow up appointment with your provider or have any further needs. Our phone number is [phone]. Have a great day.

## 2020-10-12 RX ORDER — CLOTRIMAZOLE AND BETAMETHASONE DIPROPIONATE 10; .64 MG/G; MG/G
CREAM TOPICAL
Qty: 45 G | Refills: 0 | Status: SHIPPED | OUTPATIENT
Start: 2020-10-12

## 2020-10-12 RX ORDER — CITALOPRAM 10 MG/1
10 TABLET ORAL DAILY
Qty: 30 TABLET | Refills: 3 | Status: SHIPPED | OUTPATIENT
Start: 2020-10-12 | End: 2021-01-07

## 2020-10-12 NOTE — TELEPHONE ENCOUNTER
Please Approve or Refuse.   Send to Pharmacy per Pt's Request:      Next Visit Date:  Visit date not found   Last Visit Date: 4/28/2020    Hemoglobin A1C (%)   Date Value   06/18/2018 5.5             ( goal A1C is < 7)   BP Readings from Last 3 Encounters:   09/11/20 (!) 145/78   06/13/20 (!) 140/93   01/13/20 138/86          (goal 120/80)  BUN   Date Value Ref Range Status   01/21/2020 15 6 - 20 mg/dL Final     CREATININE   Date Value Ref Range Status   01/21/2020 0.92 0.70 - 1.20 mg/dL Final     Potassium   Date Value Ref Range Status   01/21/2020 4.2 3.7 - 5.3 mmol/L Final

## 2020-10-13 RX ORDER — LOSARTAN POTASSIUM 50 MG/1
TABLET ORAL
Qty: 90 TABLET | Refills: 1 | OUTPATIENT
Start: 2020-10-13

## 2020-10-13 RX ORDER — LOSARTAN POTASSIUM 50 MG/1
TABLET ORAL
Qty: 90 TABLET | Refills: 1 | Status: SHIPPED | OUTPATIENT
Start: 2020-10-13 | End: 2020-11-09

## 2020-11-09 RX ORDER — LOSARTAN POTASSIUM 50 MG/1
TABLET ORAL
Qty: 90 TABLET | Refills: 1 | Status: SHIPPED | OUTPATIENT
Start: 2020-11-09 | End: 2021-02-04 | Stop reason: SDUPTHER

## 2020-11-24 RX ORDER — CETIRIZINE HYDROCHLORIDE 10 MG/1
TABLET ORAL
Qty: 30 TABLET | Refills: 0 | Status: SHIPPED | OUTPATIENT
Start: 2020-11-24 | End: 2021-05-20

## 2021-01-06 ENCOUNTER — NURSE TRIAGE (OUTPATIENT)
Dept: OTHER | Facility: CLINIC | Age: 46
End: 2021-01-06

## 2021-01-06 RX ORDER — CETIRIZINE HYDROCHLORIDE 10 MG/1
1 CAPSULE, LIQUID FILLED ORAL DAILY
Qty: 20 CAPSULE | Refills: 0 | Status: SHIPPED | OUTPATIENT
Start: 2021-01-06 | End: 2021-02-04 | Stop reason: SDUPTHER

## 2021-01-06 NOTE — TELEPHONE ENCOUNTER
Patient was exposed to [place] and is having symptoms so he rescheduled his physical. He did schedule a telephone visit for 01/13/21.

## 2021-01-06 NOTE — TELEPHONE ENCOUNTER
Patient called pre-service center [facility]) [place] with red flag complaint. Brief description of triage: covid symptoms, shortness of breath    Triage indicates for patient to Go to ED now    Care advice provided, patient verbalizes understanding; denies any other questions or concerns; instructed to call back for any new or worsening symptoms. Attention Provider: Thank you for allowing me to participate in the care of your patient. The patient was connected to triage in response to information provided to the ECC. Please do not respond through this encounter as the response is not directed to a shared pool. Reason for Disposition   MODERATE difficulty breathing (e.g., speaks in phrases, SOB even at rest, pulse 100-120)    Answer Assessment - Initial Assessment Questions  1. COVID-19 DIAGNOSIS: \"Who made your Coronavirus (COVID-19) diagnosis? \" \"Was it confirmed by a positive lab test?\" If not diagnosed by a HCP, ask \"Are there lots of cases (community spread) where you live? \" (See public health department website, if unsure)      Tested yesterday but has not yet received the results    2. COVID-19 EXPOSURE: \"Was there any known exposure to COVID before the symptoms began? \" CDC Definition of close contact: within 6 feet (2 meters) for a total of 15 minutes or more over a 24-hour period. 12/26/20    3. ONSET: \"When did the COVID-19 symptoms start?\"       12/29/20    4. WORST SYMPTOM: \"What is your worst symptom? \" (e.g., cough, fever, shortness of breath, muscle aches)      Cough, chills, shortness of breath    5. COUGH: \"Do you have a cough? \" If so, ask: \"How bad is the cough? \"         Comes and goes, sporadic, \"not heavy\"     6. FEVER: \"Do you have a fever? \" If so, ask: \"What is your temperature, how was it measured, and when did it start? \"      Feels warm but hasn't checked temperature    7. RESPIRATORY STATUS: \"Describe your breathing? \" (e.g., shortness of breath, wheezing, unable to speak) Shortness of breath at rest and but worse with activity, feels his breathing is labored at rest as well, able to talk in complete sentences at this time    8. BETTER-SAME-WORSE: Zia Newhope you getting better, staying the same or getting worse compared to yesterday? \"  If getting worse, ask, \"In what way? \"      Same     9. HIGH RISK DISEASE: \"Do you have any chronic medical problems? \" (e.g., asthma, heart or lung disease, weak immune system, obesity, etc.)      HTN, history of multiple URIs per patient    10. PREGNANCY: \"Is there any chance you are pregnant? \" \"When was your last menstrual period? \"        NA    11. OTHER SYMPTOMS: \"Do you have any other symptoms? \"  (e.g., chills, fatigue, headache, loss of smell or taste, muscle pain, sore throat; new loss of smell or taste especially support the diagnosis of COVID-19)        fatigue    Protocols used: CORONAVIRUS (COVID-19) [facility]AGNOSED [facility]USPECTED[facility]

## 2021-01-07 ENCOUNTER — TELEPHONE (OUTPATIENT)
Dept: FAMILY MEDICINE CLINIC | Age: 46
End: 2021-01-07

## 2021-01-07 DIAGNOSIS — K58.0 IRRITABLE BOWEL SYNDROME WITH DIARRHEA: ICD-10-CM

## 2021-01-07 RX ORDER — CITALOPRAM 10 MG/1
10 TABLET ORAL DAILY
Qty: 30 TABLET | Refills: 3 | Status: SHIPPED | OUTPATIENT
Start: 2021-01-07 | End: 2021-08-31

## 2021-01-07 NOTE — TELEPHONE ENCOUNTER
Future Appointments   Date Time Provider Angelina Anais   1/13/2021  4:00 PM Kole Khalil MD [facility]   2/4/2021  2:15 PM Kole Khalil MD fp [facility] [place]

## 2021-01-07 NOTE — TELEPHONE ENCOUNTER
PATIENT CALLED STATING HE RECEIVED HIS POSITIVE COVID-19 REULTS AND HIS SYMPTOMS ARE NOT ANY BETTER. HE IS STILL HAVING SHORTNESS OF BREATH AND HIS THROAT IS VERY PAINFUL IS THERE SOMETHING THAT CAN BE CALLED IN TO GIVE HIM SOME TYPE OF RELIEF       Please Approve or Refuse.   Send to Pharmacy per Pt's Request:      Next Visit Date:  1/13/2021   Last Visit Date: 4/28/2020    Hemoglobin A1C (%)   Date Value   06/18/2018 5.5             ( goal A1C is < 7)   BP Readings from Last 3 Encounters:   09/11/20 (!) 145/78   06/13/20 (!) 140/93   01/13/20 138/86          (goal 120/80)  BUN   Date Value Ref Range Status   01/21/2020 15 6 - 20 mg/dL Final     CREATININE   Date Value Ref Range Status   01/21/2020 0.92 0.70 - 1.20 mg/dL Final     Potassium   Date Value Ref Range Status   01/21/2020 4.2 3.7 - 5.3 mmol/L Final

## 2021-01-13 PROBLEM — U07.1 COVID-19 VIRUS INFECTION: Status: ACTIVE | Noted: 2021-01-13

## 2021-01-16 ENCOUNTER — HOSPITAL ENCOUNTER (OUTPATIENT)
Dept: GENERAL RADIOLOGY | Age: 46
Discharge: HOME | End: 2021-01-18
Payer: COMMERCIAL

## 2021-01-16 ENCOUNTER — HOSPITAL ENCOUNTER (OUTPATIENT)
Age: 46
Discharge: HOME | End: 2021-01-18
Payer: COMMERCIAL

## 2021-01-16 ENCOUNTER — HOSPITAL ENCOUNTER (OUTPATIENT)
Age: 46
Discharge: HOME | End: 2021-01-16
Payer: COMMERCIAL

## 2021-01-16 DIAGNOSIS — E78.2 MIXED HYPERLIPIDEMIA: ICD-10-CM

## 2021-01-16 DIAGNOSIS — J40 BRONCHITIS: ICD-10-CM

## 2021-01-16 DIAGNOSIS — I10 ESSENTIAL HYPERTENSION: ICD-10-CM

## 2021-01-16 LAB
ABSOLUTE EOS #: 0.04 K/UL (ref 0–0.4)
ABSOLUTE IMMATURE GRANULOCYTE: ABNORMAL K/UL (ref 0–0.3)
ABSOLUTE LYMPH #: 2.35 K/UL (ref 1–4.8)
ABSOLUTE MONO #: 0.34 K/UL (ref 0.1–1.3)
ALBUMIN SERPL-MCNC: 4.1 G/DL (ref 3.5–5.2)
ALBUMIN/GLOBULIN RATIO: ABNORMAL (ref 1–2.5)
ALP BLD-CCNC: 63 U/L (ref 40–129)
ALT SERPL-CCNC: 21 U/L (ref 5–41)
ANION GAP SERPL CALCULATED.3IONS-SCNC: 7 MMOL/L (ref 9–17)
AST SERPL-CCNC: 19 U/L
ATYPICAL LYMPHOCYTE ABSOLUTE COUNT: 0.21 K/UL
ATYPICAL LYMPHOCYTES: 5 %
BASOPHILS # BLD: 0 % (ref 0–2)
BASOPHILS ABSOLUTE: 0 K/UL (ref 0–0.2)
BILIRUB SERPL-MCNC: 0.25 MG/DL (ref 0.3–1.2)
BUN BLDV-MCNC: 11 MG/DL (ref 6–20)
BUN/CREAT BLD: ABNORMAL (ref 9–20)
CALCIUM SERPL-MCNC: 9.2 MG/DL (ref 8.6–10.4)
CHLORIDE BLD-SCNC: 103 MMOL/L (ref 98–107)
CHOLESTEROL/HDL RATIO: 4
CHOLESTEROL: 174 MG/DL
CO2: 28 MMOL/L (ref 20–31)
CREAT SERPL-MCNC: 1.03 MG/DL (ref 0.7–1.2)
DIFFERENTIAL TYPE: ABNORMAL
EOSINOPHILS RELATIVE PERCENT: 1 % (ref 0–4)
GFR AFRICAN AMERICAN: >60 ML/MIN
GFR NON-AFRICAN AMERICAN: >60 ML/MIN
GFR SERPL CREATININE-BSD FRML MDRD: ABNORMAL ML/MIN/{1.73_M2}
GFR SERPL CREATININE-BSD FRML MDRD: ABNORMAL ML/MIN/{1.73_M2}
GLUCOSE BLD-MCNC: 92 MG/DL (ref 70–99)
HCT VFR BLD CALC: 40.2 % (ref 41–53)
HDLC SERPL-MCNC: 44 MG/DL
HEMOGLOBIN: 14.1 G/DL (ref 13.5–17.5)
IMMATURE GRANULOCYTES: ABNORMAL %
LDL CHOLESTEROL: 121 MG/DL (ref 0–130)
LYMPHOCYTES # BLD: 56 % (ref 24–44)
MCH RBC QN AUTO: 30.8 PG (ref 26–34)
MCHC RBC AUTO-ENTMCNC: 35 G/DL (ref 31–37)
MCV RBC AUTO: 88 FL (ref 80–100)
MONOCYTES # BLD: 8 % (ref 1–7)
MORPHOLOGY: NORMAL
NRBC AUTOMATED: ABNORMAL PER 100 WBC
PDW BLD-RTO: 13.6 % (ref 11.5–14.9)
PLATELET # BLD: 452 K/UL (ref 150–450)
PLATELET ESTIMATE: ABNORMAL
PMV BLD AUTO: 7.1 FL (ref 6–12)
POTASSIUM SERPL-SCNC: 3.9 MMOL/L (ref 3.7–5.3)
RBC # BLD: 4.57 M/UL (ref 4.5–5.9)
RBC # BLD: ABNORMAL 10*6/UL
SEG NEUTROPHILS: 30 % (ref 36–66)
SEGMENTED NEUTROPHILS ABSOLUTE COUNT: 1.26 K/UL (ref 1.3–9.1)
SODIUM BLD-SCNC: 138 MMOL/L (ref 135–144)
TOTAL PROTEIN: 7 G/DL (ref 6.4–8.3)
TRIGL SERPL-MCNC: 47 MG/DL
VLDLC SERPL CALC-MCNC: NORMAL MG/DL (ref 1–30)
WBC # BLD: 4.2 K/UL (ref 3.5–11)
WBC # BLD: ABNORMAL 10*3/UL

## 2021-01-16 PROCEDURE — 36415 COLL VENOUS BLD VENIPUNCTURE: CPT

## 2021-01-16 PROCEDURE — 85025 COMPLETE CBC W/AUTO DIFF WBC: CPT

## 2021-01-16 PROCEDURE — 80053 COMPREHEN METABOLIC PANEL: CPT

## 2021-01-16 PROCEDURE — 83036 HEMOGLOBIN GLYCOSYLATED A1C: CPT

## 2021-01-16 PROCEDURE — 80061 LIPID PANEL: CPT

## 2021-01-16 PROCEDURE — 71046 X-RAY EXAM CHEST 2 VIEWS: CPT

## 2021-01-17 LAB
ESTIMATED AVERAGE GLUCOSE: 137 MG/DL
HBA1C MFR BLD: 6.4 % (ref 4–6)

## 2021-01-18 DIAGNOSIS — D72.820 LYMPHOCYTOSIS: ICD-10-CM

## 2021-01-18 DIAGNOSIS — R73.03 PREDIABETES: Primary | ICD-10-CM

## 2021-01-18 RX ORDER — METFORMIN HYDROCHLORIDE 500 MG/1
500 TABLET, EXTENDED RELEASE ORAL
Qty: 60 TABLET | Refills: 3 | Status: SHIPPED | OUTPATIENT
Start: 2021-01-18 | End: 2021-02-04

## 2021-02-04 ENCOUNTER — OFFICE VISIT (OUTPATIENT)
Dept: FAMILY MEDICINE CLINIC | Age: 46
End: 2021-02-04
Payer: COMMERCIAL

## 2021-02-04 VITALS
HEIGHT: 68 IN | TEMPERATURE: 97.2 F | HEART RATE: 85 BPM | WEIGHT: 192 LBS | BODY MASS INDEX: 29.1 KG/M2 | DIASTOLIC BLOOD PRESSURE: 100 MMHG | SYSTOLIC BLOOD PRESSURE: 140 MMHG | OXYGEN SATURATION: 97 %

## 2021-02-04 DIAGNOSIS — F32.0 MILD SINGLE CURRENT EPISODE OF MAJOR DEPRESSIVE DISORDER (HCC): ICD-10-CM

## 2021-02-04 DIAGNOSIS — Z98.890 H/O COLONOSCOPY WITH POLYPECTOMY: ICD-10-CM

## 2021-02-04 DIAGNOSIS — Z86.010 H/O COLONOSCOPY WITH POLYPECTOMY: ICD-10-CM

## 2021-02-04 DIAGNOSIS — K21.9 GASTROESOPHAGEAL REFLUX DISEASE, UNSPECIFIED WHETHER ESOPHAGITIS PRESENT: ICD-10-CM

## 2021-02-04 DIAGNOSIS — I10 ESSENTIAL HYPERTENSION: ICD-10-CM

## 2021-02-04 DIAGNOSIS — K58.0 IRRITABLE BOWEL SYNDROME WITH DIARRHEA: ICD-10-CM

## 2021-02-04 DIAGNOSIS — Z00.00 ANNUAL PHYSICAL EXAM: Primary | ICD-10-CM

## 2021-02-04 DIAGNOSIS — R73.03 PREDIABETES: ICD-10-CM

## 2021-02-04 DIAGNOSIS — Z20.2 STD EXPOSURE: ICD-10-CM

## 2021-02-04 DIAGNOSIS — E78.2 MIXED HYPERLIPIDEMIA: ICD-10-CM

## 2021-02-04 DIAGNOSIS — R71.8 ELEVATED HEMATOCRIT: ICD-10-CM

## 2021-02-04 PROBLEM — F10.11 HISTORY OF ALCOHOL ABUSE: Status: RESOLVED | Noted: 2018-05-31 | Resolved: 2021-02-04

## 2021-02-04 PROCEDURE — 90686 IIV4 VACC NO PRSV 0.5 ML IM: CPT | Performed by: FAMILY MEDICINE

## 2021-02-04 PROCEDURE — 99396 PREV VISIT EST AGE 40-64: CPT | Performed by: FAMILY MEDICINE

## 2021-02-04 PROCEDURE — G8482 FLU IMMUNIZE ORDER/ADMIN: HCPCS | Performed by: FAMILY MEDICINE

## 2021-02-04 PROCEDURE — 90471 IMMUNIZATION ADMIN: CPT | Performed by: FAMILY MEDICINE

## 2021-02-04 RX ORDER — LOSARTAN POTASSIUM 50 MG/1
TABLET ORAL
Qty: 90 TABLET | Refills: 1 | Status: SHIPPED | OUTPATIENT
Start: 2021-02-04 | End: 2021-10-26

## 2021-02-04 ASSESSMENT — PATIENT HEALTH QUESTIONNAIRE - PHQ9
SUM OF ALL RESPONSES TO PHQ9 QUESTIONS 1 & 2: 0
SUM OF ALL RESPONSES TO PHQ QUESTIONS 1-9: 0
2. FEELING DOWN, DEPRESSED OR HOPELESS: 0
1. LITTLE INTEREST OR PLEASURE IN DOING THINGS: 0

## 2021-02-04 ASSESSMENT — ENCOUNTER SYMPTOMS
COUGH: 0
SHORTNESS OF BREATH: 0
WHEEZING: 0
CHEST TIGHTNESS: 0
BLOOD IN STOOL: 0
CONSTIPATION: 0
VOMITING: 0
ABDOMINAL DISTENTION: 0
SINUS PRESSURE: 0
COLOR CHANGE: 0
PHOTOPHOBIA: 0

## 2021-02-04 NOTE — PROGRESS NOTES
Visit Information    Have you changed or started any medications since your last visit including any over-the-counter medicines, vitamins, or herbal medicines? no   Are you having any side effects from any of your medications? -  no  Have you stopped taking any of your medications? Is so, why? -  no    Have you seen any other physician or provider since your last visit? No  Have you had any other diagnostic tests since your last visit? Yes - Records Obtained  Have you been seen in the emergency room and/or had an admission to a hospital since we last saw you? Yes - Records Obtained  Have you had your routine dental cleaning in the past 6 months? no    Have you activated your Weaved account? If not, what are your barriers?  Yes     Patient Care Team:  Lluvia Orellana MD as PCP - General (Family Medicine)  Lluvia Orellana MD as PCP - [facility]    Medical History Review  Past Medical, Family, and Social History reviewed and does contribute to the patient presenting condition    Health Maintenance   Topic Date Due    Colon cancer screen colonoscopy  09/01/2019    Flu vaccine (1) 09/01/2020    A1C test (Diabetic or Prediabetic)  01/16/2022    Potassium monitoring  01/16/2022    Creatinine monitoring  01/16/2022    Lipid screen  01/16/2026    DTaP/Tdap/Td vaccine (2 - Td) 05/31/2028    Hepatitis C screen  Completed    HIV screen  Completed    Hepatitis A vaccine  Aged Out    Hepatitis B vaccine  Aged Out    Hib vaccine  Aged Out    Meningococcal (ACWY) vaccine  Aged Out    Pneumococcal 0-64 years Vaccine  Aged Out

## 2021-02-04 NOTE — PROGRESS NOTES
Chief Complaint   Patient presents with    Annual Exam    Hypertension         Kingsley Gilbert  here today for follow up on chronic medical problems, go over labs and/or diagnostic studies, and medication refills. Annual Exam and Hypertension      HPI: Patient is here for physical.    History of hypertension fairly controlled reports compliance with medications. He does check his blood pressure at home reports recently starting high because he works sick and he is taking a lot of cough medications and allergy medications. He was recently diagnosed with Covid infection which has resolved. Hyperlipidemia has improved from previous blood work. Patient is due for colonoscopy, reports he has to reschedule as he missed his appointment due to Covid. Prediabetes A1c has increased to 6.4 patient was started on Metformin reports he is not taking that. Patient reports he was taking a lot of sugary drinks he will work on weight and diet to bring it down. Patient had elevated platelets on blood work. Patient wants to be checked for STDs . BP (!) 140/100   Pulse 85   Temp 97.2 °F (36.2 °C) (Temporal)   Ht 5' 8\" (1.727 m)   Wt 192 lb (87.1 kg)   SpO2 97%   BMI 29.19 kg/m²    Body mass index is 29.19 kg/m². Wt Readings from Last 3 Encounters:   02/04/21 192 lb (87.1 kg)   09/11/20 200 lb (90.7 kg)   06/13/20 198 lb 3.2 oz (89.9 kg)        [x]Negative depression screening. PHQ Scores 2/4/2021 5/31/2018   PHQ2 Score 0 1   PHQ9 Score 0 1      []1-4 = Minimal depression   []5-9 = Milddepression   []10-14 = Moderate depression   []15-19 = Moderately severe depression   []20-27 = Severe depression    Discussed testing with the patient and all questions fully answered.     Hospital Outpatient Visit on 01/16/2021   Component Date Value Ref Range Status    WBC 01/16/2021 4.2  3.5 - 11.0 k/uL Final    RBC 01/16/2021 4.57  4.5 - 5.9 m/uL Final    Hemoglobin 01/16/2021 14.1  13.5 - 17.5 g/dL Final    Final    ALT 01/16/2021 21  5 - 41 U/L Final    AST 01/16/2021 19  <40 U/L Final    Total Bilirubin 01/16/2021 0.25* 0.3 - 1.2 mg/dL Final    Total Protein 01/16/2021 7.0  6.4 - 8.3 g/dL Final    Albumin 01/16/2021 4.1  3.5 - 5.2 g/dL Final    Albumin/Globulin Ratio 01/16/2021 NOT REPORTED  1.0 - 2.5 Final    GFR Non- 01/16/2021 >60  >60 mL/min Final    GFR  01/16/2021 >60  >60 mL/min Final    GFR Comment 01/16/2021        Final    Comment: Average GFR for 38-51 years old:   80 mL/min/1.73sq m  Chronic Kidney Disease:   <60 mL/min/1.73sq m  Kidney failure:   <15 mL/min/1.73sq m              eGFR calculated using average adult body mass. Additional eGFR calculator available at:        [email or web address]            GFR Staging 01/16/2021 NOT REPORTED   Final    Cholesterol 01/16/2021 174  <200 mg/dL Final    Comment:    Cholesterol Guidelines:      <200  Desirable   200-240  Borderline      >240  Undesirable         HDL 01/16/2021 44  >40 mg/dL Final    Comment:    HDL Guidelines:    <40     Undesirable   40-59    Borderline    >59     Desirable         LDL Cholesterol 01/16/2021 121  0 - 130 mg/dL Final    Comment:    LDL Guidelines:     <100    Desirable   100-129   Near to/above Desirable   130-159   Borderline      >159   Undesirable     Direct (measured) LDL and calculated LDL are not interchangeable tests.  Chol/HDL Ratio 01/16/2021 4.0  <5 Final            Triglycerides 01/16/2021 47  <150 mg/dL Final    Comment:    Triglyceride Guidelines:     <150   Desirable   150-199  Borderline   200-499  High     >499   Very high   Based on AHA Guidelines for fasting triglyceride, October 2012.          VLDL 01/16/2021 NOT REPORTED  1 - 30 mg/dL Final    Hemoglobin A1C 01/16/2021 6.4* 4.0 - 6.0 % Final    Estimated Avg Glucose 01/16/2021 137  mg/dL Final    Comment: The ADA and AACC recommend providing the estimated average glucose result to permit better   patient understanding of their HBA1c result. Most recent labs reviewed:     Lab Results   Component Value Date    WBC 4.2 01/16/2021    HGB 14.1 01/16/2021    HCT 40.2 (L) 01/16/2021    MCV 88.0 01/16/2021     (H) 01/16/2021       @BRIEFLAB(NA,K,CL,CO2,BUN,CREATININE,GLUCOSE,CALCIUM)@     Lab Results   Component Value Date    ALT 21 01/16/2021    AST 19 01/16/2021    ALKPHOS 63 01/16/2021    BILITOT 0.25 (L) 01/16/2021       Lab Results   Component Value Date    TSH 1.78 08/18/2016       Lab Results   Component Value Date    CHOL 174 01/16/2021    CHOL 212 (H) 01/21/2020    CHOL 188 06/18/2018     Lab Results   Component Value Date    TRIG 47 01/16/2021    TRIG 55 01/21/2020    TRIG 75 06/18/2018     Lab Results   Component Value Date    HDL 44 01/16/2021    HDL 63 01/21/2020    HDL 59 06/18/2018     Lab Results   Component Value Date    LDLCHOLESTEROL 121 01/16/2021    LDLCHOLESTEROL 138 (H) 01/21/2020    LDLCHOLESTEROL 114 06/18/2018     Lab Results   Component Value Date    VLDL NOT REPORTED 01/16/2021    VLDL NOT REPORTED 01/21/2020    VLDL NOT REPORTED 06/18/2018     Lab Results   Component Value Date    CHOLHDLRATIO 4.0 01/16/2021    CHOLHDLRATIO 3.4 01/21/2020    CHOLHDLRATIO 3.2 06/18/2018       Lab Results   Component Value Date    LABA1C 6.4 (H) 01/16/2021       No results found for: [id number]    No results found for: FOLATE    No results found for: IRON, TIBC, FERRITIN    Lab Results   Component Value Date    VITD25 23.8 (L) 11/26/2018             Current Outpatient Medications   Medication Sig Dispense Refill    losartan (COZAAR) 50 MG tablet TAKE 1.5 tab TABLET BY MOUTH DAILY 90 tablet 1    citalopram (CELEXA) 10 MG tablet TAKE 1 TABLET BY MOUTH DAILY 30 tablet 3    cetirizine (ZYRTEC) 10 MG tablet TAKE 1 TABLET BY MOUTH DAILY 30 tablet 0    clotrimazole-betamethasone (LOTRISONE) 1-0.05 % cream Apply topically 2 times daily.  45 g 0    prednisoLONE sodium phosphate (INFLAMASE FORTE) 1 % ophthalmic solution 1 drop 4 times daily      EPINEPHrine (EPIPEN) 0.3 MG/0.3ML SOAJ injection Inject 1 mL into the muscle as needed (Anaphylaxis) Use as directed for allergic reaction 2 each 0    Elastic Bandages & Supports (KNEE BRACE/HINGED/LARGE) MISC Use daily for knee pain 1 each 0    Blood Pressure KIT Dx: HTN. Needs automatic blood pressure machine to monitor his blood pressure. 1 kit 0    Ciclopirox (LOPROX) 0.77 % gel Apply in between toes twice daily. 1 Tube 3    Cholecalciferol (VITAMIN D3) 1000 units TABS Take 1 tablet by mouth daily 90 tablet 1    melatonin 5 MG TABS tablet Take 5 mg by mouth daily       No current facility-administered medications for this visit. Social History     Socioeconomic History    Marital status:      Spouse name: Not on file    Number of children: Not on file    Years of education: Not on file    Highest education level: Not on file   Occupational History    Not on file   Social Needs    Financial resource strain: Not on file    Food insecurity     Worry: Not on file     Inability: Not on file    Transportation needs     Medical: Not on file     Non-medical: Not on file   Tobacco Use    Smoking status: Former Smoker     Types: Cigars     Quit date: 2018     Years since quittin.2    Smokeless tobacco: Never Used    Tobacco comment: quit 2016   Substance and Sexual Activity    Alcohol use:  Yes     Alcohol/week: 0.0 standard drinks     Comment: occasional    Drug use: No    Sexual activity: Not on file   Lifestyle    Physical activity     Days per week: Not on file     Minutes per session: Not on file    Stress: Not on file   Relationships    Social connections     Talks on phone: Not on file     Gets together: Not on file     Attends Caodaism service: Not on file     Active member of club or organization: Not on file     Attends meetings of clubs or organizations: Not on file     Relationship status: Not on file    Intimate partner violence     Fear of current or ex partner: Not on file     Emotionally abused: Not on file     Physically abused: Not on file     Forced sexual activity: Not on file   Other Topics Concern    Not on file   Social History Narrative    Not on file     Counseling given: Yes  Comment: quit 2016        Family History   Problem Relation Age of Onset    Hypertension Mother     Diabetes Mother              -rest of complaints with corresponding details per ROS    The patient's past medical, surgical, social, and family history as well as his current medications and allergies were reviewed as documented intoday's encounter. Review of Systems   Constitutional: Negative for activity change, appetite change, diaphoresis, fatigue, fever and unexpected weight change. HENT: Negative for congestion, hearing loss, mouth sores, postnasal drip and sinus pressure. Eyes: Negative for photophobia and visual disturbance. Respiratory: Negative for cough, chest tightness, shortness of breath and wheezing. Cardiovascular: Negative for chest pain, palpitations and leg swelling. Gastrointestinal: Negative for abdominal distention, blood in stool, constipation and vomiting. Endocrine: Negative for polyuria. Genitourinary: Negative for difficulty urinating, flank pain, frequency and urgency. Musculoskeletal: Negative for arthralgias, gait problem, myalgias and neck pain. Skin: Negative for color change. Neurological: Negative for dizziness, speech difficulty, weakness, light-headedness, numbness and headaches. Psychiatric/Behavioral: Negative for agitation, behavioral problems, decreased concentration, dysphoric mood and sleep disturbance. The patient is not nervous/anxious.             Physical Exam      PHYSICAL EXAM:   VITALS:   Vitals:    02/04/21 1423   BP: (!) 140/100   Pulse: 85   Temp: 97.2 °F (36.2 °C)   SpO2: 97%     GENERAL:  Patient is a well-developed, well-nourished male  in no acute distress, alert and oriented x3, appropriate and pleasant conversation. HEAD: Normocephalic, atraumatic. EYES: Pupils equal, round and reactive to light and accommodation, extraocular   movements intact. ENT: Moist mucous membranes. No erythema is noted. NECK: Supple. No masses. No lymphadenopathy. CARDIOVASCULAR: Regular rate and rhythm. PULMONARY: Lungs are clear to auscultation bilaterally. ABDOMEN: Soft, nontender, nondistended. Positive bowel sounds. MUSCULOSKELETAL: Strength 5/5 bilaterally in all extremities. No tenderness to   palpation of the ribs, long bones, or spine. NEUROLOGIC: Cranial nerves II through XII grossly intact. No focal deficits are noted. ASSESSMENT AND PLAN      1. Annual physical exam    Patient is up-to-date on blood work     2. Essential hypertension  Fairly controlled increase losartan to 75 mg monitor blood pressure at home call back with blood pressure readings  - losartan (COZAAR) 50 MG tablet; TAKE 1.5 tab TABLET BY MOUTH DAILY  Dispense: 90 tablet; Refill: 1    3. Mixed hyperlipidemia  Stable continue statin    4. Gastroesophageal reflux disease, unspecified whether esophagitis present  Stable on current treatment    5. Prediabetes  Discontinue Metformin as per patient, discussed weight reduction and monitor your diet    6. Elevated hematocrit  Recheck CBC  - Urinalysis Reflex to Culture; Future  - CBC Auto Differential; Future    7. Mild single current episode of major depressive disorder (HCC)  Stable on current treatment    8. Irritable bowel syndrome with diarrhea    - Urinalysis Reflex to Culture; Future    9. H/O colonoscopy with polypectomy    10. STD exposure    - Chlamydia/GC DNA, Urine;  Future      Orders Placed This Encounter   Procedures    Chlamydia/GC DNA, Urine     Standing Status:   Future     Standing Expiration Date:   2/4/2022    INFLUENZA, QUADV, 3 YRS AND OLDER, IM PF, PREFILL SYR OR SDV, 0.5ML (AFLURIA QUADV, PF)    Urinalysis Reflex to Culture     Standing Status:   Future     Standing Expiration Date:   2/4/2022     Order Specific Question:   SPECIFY(EX-CATH,MIDSTREAM,CYSTO,ETC)? Answer:   midstream    CBC Auto Differential     Standing Status:   Future     Standing Expiration Date:   2/5/2022         Medications Discontinued During This Encounter   Medication Reason    guaiFENesin (MUCINEX CHEST CONGESTION CHILD) 100 MG/5ML liquid Therapy completed    Cetirizine HCl (ZYRTEC ALLERGY) 10 MG CAPS DUPLICATE    Loratadine (CLARITIN PO) DUPLICATE    metFORMIN (GLUCOPHAGE-XR) 500 MG extended release tablet Patient Choice    losartan (COZAAR) 50 MG tablet REORDER       John received counseling on the following healthy behaviors: nutrition, exercise, medication adherence and tobacco cessation  Reviewed prior labs and health maintenance  Continue current medications, diet and exercise. Discussed use, benefit, and side effects of prescribed medications. Barriers to medication compliance addressed. Patient given educational materials - see patient instructions  Was a self-tracking handout given in paper form or via [email or web address]? Yes    Requested Prescriptions     Signed Prescriptions Disp Refills    losartan (COZAAR) 50 MG tablet 90 tablet 1     Sig: TAKE 1.5 tab TABLET BY MOUTH DAILY       All patient questions answered. Patient voiced understanding. Quality Measures    Body mass index is 29.19 kg/m². Elevated. Weight control planned discussed daily exercise regimen and Healthy diet and regular exercise. BP: (!) 140/100 Blood pressure is high. Treatment plan consists of Dietary Sodium Restriction, Increased Physical Activity and No treatment change needed.     Lab Results   Component Value Date    LDLCHOLESTEROL 121 01/16/2021    (goal LDL reduction with dx if diabetes is 50% LDL reduction)      PHQ Scores 2/4/2021 5/31/2018   PHQ2 Score 0 1   PHQ9 Score 0 1     Interpretation of Total Score Depression Severity: 1-4 = Minimal depression, 5-9 = Mild depression, 10-14 = Moderate depression, 15-19 = Moderately severe depression, 20-27 = Severe depression    The patient'spast medical, surgical, social, and family history as well as his   current medications and allergies were reviewed as documented in today's encounter. Medications, labs, diagnostic studies, consultations andfollow-up as documented in this encounter. Return in about 3 months (around 5/4/2021). Patient wasseen with total face to face time of 40 minutes. More than 50% of this visit was counseling and education. Future Appointments   Date Time Provider Angelina Manzo   5/19/2021  4:30 PM MD octavio Bentonr     This note was completed by using the assistance of a speech-recognition program. However, inadvertent computerized transcription errors may be present. Althoughevery effort was made to ensure accuracy, no guarantees can be provided that every mistake has been identified and corrected by editing.   Electronically signed by Ayde Mccall MD on 2/4/2021  3:05 PM

## 2021-04-23 ENCOUNTER — IMMUNIZATION (OUTPATIENT)
Dept: FAMILY MEDICINE CLINIC | Age: 46
End: 2021-04-23
Payer: COMMERCIAL

## 2021-04-23 PROCEDURE — 91300 COVID-19, PFIZER VACCINE 30MCG/0.3ML DOSE: CPT | Performed by: INTERNAL MEDICINE

## 2021-04-23 PROCEDURE — 0001A COVID-19, PFIZER VACCINE 30MCG/0.3ML DOSE: CPT | Performed by: INTERNAL MEDICINE

## 2021-05-14 ENCOUNTER — IMMUNIZATION (OUTPATIENT)
Dept: FAMILY MEDICINE CLINIC | Age: 46
End: 2021-05-14
Payer: COMMERCIAL

## 2021-05-14 PROCEDURE — 0002A COVID-19, PFIZER VACCINE 30MCG/0.3ML DOSE: CPT | Performed by: INTERNAL MEDICINE

## 2021-05-14 PROCEDURE — 91300 COVID-19, PFIZER VACCINE 30MCG/0.3ML DOSE: CPT | Performed by: INTERNAL MEDICINE

## 2021-05-19 ENCOUNTER — TELEMEDICINE (OUTPATIENT)
Dept: FAMILY MEDICINE CLINIC | Age: 46
End: 2021-05-19
Payer: COMMERCIAL

## 2021-05-19 DIAGNOSIS — K58.0 IRRITABLE BOWEL SYNDROME WITH DIARRHEA: ICD-10-CM

## 2021-05-19 DIAGNOSIS — R73.03 PREDIABETES: ICD-10-CM

## 2021-05-19 DIAGNOSIS — I10 ESSENTIAL HYPERTENSION: Primary | ICD-10-CM

## 2021-05-19 DIAGNOSIS — E78.2 MIXED HYPERLIPIDEMIA: ICD-10-CM

## 2021-05-19 DIAGNOSIS — R71.8 ELEVATED HEMATOCRIT: ICD-10-CM

## 2021-05-19 PROCEDURE — 99213 OFFICE O/P EST LOW 20 MIN: CPT | Performed by: FAMILY MEDICINE

## 2021-05-19 PROCEDURE — G8427 DOCREV CUR MEDS BY ELIG CLIN: HCPCS | Performed by: FAMILY MEDICINE

## 2021-05-19 ASSESSMENT — ENCOUNTER SYMPTOMS
RECTAL PAIN: 0
CONSTIPATION: 0
WHEEZING: 0
RHINORRHEA: 0
BLOOD IN STOOL: 0
ABDOMINAL DISTENTION: 0
COUGH: 0
SHORTNESS OF BREATH: 0
PHOTOPHOBIA: 0
DIARRHEA: 0
VOMITING: 0
ABDOMINAL PAIN: 1
COLOR CHANGE: 0

## 2021-05-19 NOTE — PROGRESS NOTES
[place] [address]  Phone: [phone], Fax: [phone]    TELEHEALTH EVALUATION -- Audio/Visual (During VZSAJ-30 public health emergency)    Patient ID verified by me prior to start of this visit    Danita Raman (:  1975) has requested an audio/video evaluation for the following concern(s):  Chief Complaint   Patient presents with    Labs Only      HPI:  Danita Raman is an established patient of Kalyn Layne MD  . Patient has a history of prediabetes last A1c was 6.4, patient does not want any medications. He wanted to watch his diet. He needs repeat A1c. Hypertension controlled, does not record his blood pressure at home. Compliant with losartan. Patient had blood work ordered he has not done that. Hyperlipidemia on statins    Patient also had elevated hematocrit and platelets, did not repeat CBC. Patient is due for colonoscopy, and agrees to schedule. []Negative depression screening. []1-4 = Minimal depression   []5-9 = Mild depression   []10-14 = Moderate depression   []15-19 = Moderately severe depression   []20-27 = Severe depression  PHQ Scores 2021   PHQ2 Score 0 1   PHQ9 Score 0 1     Review of Systems   Constitutional: Negative for activity change and fever. HENT: Negative for congestion and rhinorrhea. Eyes: Negative for photophobia and visual disturbance. Respiratory: Negative for cough, shortness of breath and wheezing. Cardiovascular: Negative for chest pain and leg swelling. Gastrointestinal: Positive for abdominal pain. Negative for abdominal distention, blood in stool, constipation, diarrhea, rectal pain and vomiting. Genitourinary: Negative for flank pain and urgency. Musculoskeletal: Negative for arthralgias, gait problem, neck pain and neck stiffness. Skin: Negative for color change and rash. Neurological: Negative for speech difficulty, weakness and numbness. Psychiatric/Behavioral: Negative for agitation and hallucinations. The patient is nervous/anxious. Patient Active Problem List    Diagnosis Date Noted    Prediabetes 02/04/2021    COVID-19 virus infection 01/13/2021    Acute pain of left knee 04/28/2020    H/O colonoscopy with polypectomy 04/28/2020    Chronic pain of right ankle 04/28/2020    Toenail deformity 01/03/2019    GERD (gastroesophageal reflux disease) 11/26/2018    Mild single current episode of major depressive disorder ([facility] [address] 08/02/2018    Cigar smoker 08/02/2018    Hyperlipidemia 05/31/2018    Herpes simplex type II infection 05/31/2018    Anxiety     Iron deficiency anemia     Irritable bowel syndrome with diarrhea 05/11/2016    Vertigo 05/11/2016    Seasonal allergies 05/11/2016    Toenail fungus 03/30/2016    Back spasm 03/30/2016    Essential hypertension 03/28/2014        Past Surgical History:   Procedure Laterality Date    ANKLE SURGERY  02/23/14    HAND SURGERY  08/02/13    URETHRA SURGERY  1985     Family History   Problem Relation Age of Onset    Hypertension Mother     Diabetes Mother      Current Outpatient Medications   Medication Sig Dispense Refill    losartan (COZAAR) 50 MG tablet TAKE 1.5 tab TABLET BY MOUTH DAILY 90 tablet 1    citalopram (CELEXA) 10 MG tablet TAKE 1 TABLET BY MOUTH DAILY 30 tablet 3    cetirizine (ZYRTEC) 10 MG tablet TAKE 1 TABLET BY MOUTH DAILY 30 tablet 0    clotrimazole-betamethasone (LOTRISONE) 1-0.05 % cream Apply topically 2 times daily. 45 g 0    prednisoLONE sodium phosphate (INFLAMASE FORTE) 1 % ophthalmic solution 1 drop 4 times daily      EPINEPHrine (EPIPEN) 0.3 MG/0.3ML SOAJ injection Inject 1 mL into the muscle as needed (Anaphylaxis) Use as directed for allergic reaction 2 each 0    Elastic Bandages & Supports (KNEE BRACE/HINGED/LARGE) MISC Use daily for knee pain 1 each 0    Blood Pressure KIT Dx: HTN.  Needs automatic blood pressure machine to monitor his blood pressure. 1 kit 0    Ciclopirox (LOPROX) 0.77 % gel Apply in between toes twice daily. 1 Tube 3    Cholecalciferol (VITAMIN D3) 1000 units TABS Take 1 tablet by mouth daily 90 tablet 1    melatonin 5 MG TABS tablet Take 5 mg by mouth daily       No current facility-administered medications for this visit. Not on File     Social History     Tobacco Use    Smoking status: Former Smoker     Types: Cigars     Quit date: 2018     Years since quittin.5    Smokeless tobacco: Never Used    Tobacco comment: quit 2016   Substance Use Topics    Alcohol use: Yes     Alcohol/week: 0.0 standard drinks     Comment: occasional    Drug use: No        PHYSICAL EXAMINATION:  Vital Signs: (As obtained by patient/caregiver or practitioner observation)    Constitutional: [x] Appears well-developed and well-nourished [x] No apparent distress      [] Abnormal-   Mental status  [x] Alert and awake  [x] Oriented to person/place/time [x]Able to follow commands      Eyes:  EOM    [x]  Normal  [] Abnormal-  Sclera  [x]  Normal  [] Abnormal -         Discharge [x]  None visible  [] Abnormal -    HENT:   [x] Normocephalic, atraumatic.   [] Abnormal   [x] Mouth/Throat: Mucous membranes are moist.     External Ears [x] Normal  [] Abnormal-     Neck: [x] No visualized mass     Pulmonary/Chest: [x] Respiratory effort normal.  [x] No visualized signs of difficulty breathing or respiratory distress        [] Abnormal     Musculoskeletal:   [x] Normal gait with no signs of ataxia         [x] Normal range of motion of neck        [] Abnormal-     Neurological:        [x] No Facial Asymmetry (Cranial nerve 7 motor function) (limited exam to video visit)          [x] No gaze palsy        [] Abnormal-     Skin:        [x] No significant exanthematous lesions or discoloration noted on facial skin         [] Abnormal-     Psychiatric:       [x] Normal Affect [x] No Hallucinations        [x] Abnormal- Anxious, with pressured speech Rossi Uriarte is a 55 y.o. male patient  being evaluated by a Virtual Visit (video visit) encounter to address concerns as mentioned above. A caregiver was present when appropriate. Due to this being a TeleHealth encounter (During KJAKE-56 public [facility] emergency), evaluation of the following organ systems was limited:Vitals/Constitutional/EENT/Resp/CV/GI//MS/Neuro/Skin/Heme-Lymph-Imm. Services were provided through a video synchronous discussion virtually to substitute for in-person clinic visit. This is a telehealth visit that was performed with the originating site at Patient Location: home and provider Location of [place], [place]. Verbal consent to participate in video visit was obtained. Patient ID verified by me prior to start of this visit  I discussed with the patient the nature of our telehealth visits via interactive/real-time audio/video that:  - I would evaluate the patient and recommend diagnostics and treatments based on my assessment  - Our sessions are not being recorded and that personal health information is protected  - Our team would provide follow up care in person if/when the patient needs it. Pursuant to the emergency declaration under the [facility], [address] [place] [address] authority and the [employer] and [employer] Act, this Virtual Visit was conducted with patient's (and/or legal guardian's) consent, to reduce the patient's risk of exposure to COVID-19 and provide necessary medical care. The patient (and/or legal guardian) has also been advised to contact this office for worsening conditions or problems, and seek emergency medical treatment and/or call 911 if deemed necessary. This note was completed by using the assistance of a speech-recognition program. However, inadvertent computerized transcription errors may be present.  Although every effort was made to ensure accuracy, no guarantees can be provided that every mistake has been identified and corrected by editing.   Electronically signed by Davon Sheth MD on 5/19/21 at 4:31 PM EDT

## 2021-05-20 RX ORDER — CETIRIZINE HYDROCHLORIDE 10 MG/1
TABLET ORAL
Qty: 20 TABLET | Refills: 1 | Status: SHIPPED | OUTPATIENT
Start: 2021-05-20 | End: 2021-06-16

## 2021-06-16 RX ORDER — CETIRIZINE HYDROCHLORIDE 10 MG/1
TABLET ORAL
Qty: 30 TABLET | Refills: 3 | Status: SHIPPED | OUTPATIENT
Start: 2021-06-16 | End: 2021-09-13 | Stop reason: SDUPTHER

## 2021-07-27 ENCOUNTER — HOSPITAL ENCOUNTER (EMERGENCY)
Age: 46
Discharge: HOME | End: 2021-07-27
Attending: EMERGENCY MEDICINE
Payer: COMMERCIAL

## 2021-07-27 VITALS
OXYGEN SATURATION: 98 % | HEIGHT: 67 IN | RESPIRATION RATE: 16 BRPM | SYSTOLIC BLOOD PRESSURE: 135 MMHG | BODY MASS INDEX: 29.66 KG/M2 | TEMPERATURE: 97.5 F | DIASTOLIC BLOOD PRESSURE: 86 MMHG | HEART RATE: 67 BPM | WEIGHT: 189 LBS

## 2021-07-27 DIAGNOSIS — M62.838 SPASM OF MUSCLE: Primary | ICD-10-CM

## 2021-07-27 PROCEDURE — 99284 EMERGENCY DEPT VISIT MOD MDM: CPT

## 2021-07-27 PROCEDURE — 6370000000 HC RX 637 (ALT 250 FOR IP): Performed by: EMERGENCY MEDICINE

## 2021-07-27 RX ORDER — IBUPROFEN 600 MG/1
600 TABLET ORAL EVERY 6 HOURS PRN
Qty: 20 TABLET | Refills: 0 | Status: SHIPPED | OUTPATIENT
Start: 2021-07-27 | End: 2021-10-19

## 2021-07-27 RX ORDER — LIDOCAINE 4 G/G
1 PATCH TOPICAL DAILY
Qty: 30 PATCH | Refills: 0 | Status: SHIPPED | OUTPATIENT
Start: 2021-07-27 | End: 2021-08-26

## 2021-07-27 RX ORDER — IBUPROFEN 600 MG/1
600 TABLET ORAL ONCE
Status: COMPLETED | OUTPATIENT
Start: 2021-07-27 | End: 2021-07-27

## 2021-07-27 RX ORDER — METHOCARBAMOL 500 MG/1
500 TABLET, FILM COATED ORAL 3 TIMES DAILY PRN
Qty: 6 TABLET | Refills: 0 | Status: SHIPPED | OUTPATIENT
Start: 2021-07-27 | End: 2021-08-03 | Stop reason: ALTCHOICE

## 2021-07-27 RX ORDER — LIDOCAINE 4 G/G
1 PATCH TOPICAL ONCE
Status: DISCONTINUED | OUTPATIENT
Start: 2021-07-27 | End: 2021-07-27 | Stop reason: HOSPADM

## 2021-07-27 RX ADMIN — IBUPROFEN 600 MG: 600 TABLET, FILM COATED ORAL at 02:19

## 2021-07-27 ASSESSMENT — ENCOUNTER SYMPTOMS
COUGH: 0
ABDOMINAL PAIN: 0
BACK PAIN: 1
VOMITING: 0
DIARRHEA: 0
SHORTNESS OF BREATH: 0

## 2021-07-27 ASSESSMENT — PAIN - FUNCTIONAL ASSESSMENT: PAIN_FUNCTIONAL_ASSESSMENT: 0-10

## 2021-07-27 ASSESSMENT — PAIN DESCRIPTION - ORIENTATION: ORIENTATION: RIGHT;LOWER;MID

## 2021-07-27 ASSESSMENT — PAIN SCALES - GENERAL
PAINLEVEL_OUTOF10: 8

## 2021-07-27 ASSESSMENT — PAIN DESCRIPTION - DESCRIPTORS: DESCRIPTORS: SPASM

## 2021-07-27 ASSESSMENT — PAIN DESCRIPTION - LOCATION: LOCATION: BACK

## 2021-07-27 ASSESSMENT — PAIN DESCRIPTION - FREQUENCY: FREQUENCY: INTERMITTENT

## 2021-07-27 NOTE — LETTER
[facility] ED  [address] [facility] [address] [facility] [address]  Phone: [phone]             July 27, 2021    Patient: Michael Chaudhry   YOB: 1975   Date of Visit: 7/27/2021       To Whom It May Concern:    Melia Oshea was seen and treated in our emergency department on 7/27/2021. He may return to work on 7/29/2021.       Sincerely,             Signature:__________________________________

## 2021-07-27 NOTE — ED TRIAGE NOTES
Mode of arrival (squad #, walk in, police, etc) : car       Chief complaint(s): back pain spasm       Arrival Note (brief scenario, treatment PTA, etc). : 2 weeks back pain on and off doing a lot of lifting having a lot of spasms to back lower        C= \"Have you ever felt that you should Cut down on your drinking? \"  No  A= \"Have people Annoyed you by criticizing your drinking? \"  No  G= \"Have you ever felt bad or Guilty about your drinking? \"  No  E= \"Have you ever had a drink as an Eye-opener first thing in the morning to steady your nerves or to help a hangover? \"  No      Deferred []      Reason for deferring: N/A    *If yes to two or more: probable alcohol abuse. *

## 2021-07-27 NOTE — ED PROVIDER NOTES
EMERGENCY DEPARTMENT ENCOUNTER    Pt Name: Henrietta Duncan  MRN: [id number]  [place] 1975  Date of evaluation: 7/27/21  CHIEF COMPLAINT       Chief Complaint   Patient presents with    Back Pain     HISTORY OF PRESENT ILLNESS   HPI     This is a 51-year-old male who comes in today. 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 No radiation. He took an Aleve about an hour prior to arrival without much improvement of his symptoms. He tried a stretch today without improvement of his symptoms. No bowel or bladder incontinence no fevers no history of IV drug use    REVIEW OF SYSTEMS     Review of Systems   Constitutional: Negative for fever. HENT: Negative for congestion. Respiratory: Negative for cough and shortness of breath. Cardiovascular: Negative for chest pain. Gastrointestinal: Negative for abdominal pain, diarrhea and vomiting. Genitourinary: Negative for dysuria. Musculoskeletal: Positive for back pain. Skin: Negative for rash. Neurological: Negative for headaches. All other systems reviewed and are negative.     PASTMEDICAL HISTORY     Past Medical History:   Diagnosis Date    Allergic rhinitis     Anxiety     Cervical spine pain 1/3/2019    Gastric reflux 3/30/2016    Headache(784.0)     Hepatitis     Hyperlipidemia 5/31/2018    Hypertension     IBS (irritable bowel syndrome) 12/17/2014    Insomnia     Iron deficiency anemia     Mild single current episode of major depressive disorder ([facility] [address] 8/2/2018    Pancreatitis     Seasonal allergies      SURGICAL HISTORY       Past Surgical History: Procedure Laterality Date    ANKLE SURGERY  14    HAND SURGERY  13    URETHRA SURGERY  1985     CURRENT MEDICATIONS       Previous Medications    BLOOD PRESSURE KIT    Dx: HTN. Needs automatic blood pressure machine to monitor his blood pressure. CETIRIZINE (ZYRTEC) 10 MG TABLET    TAKE 1 TABLET BY MOUTH DAILY    CHOLECALCIFEROL (VITAMIN D3) 1000 UNITS TABS    Take 1 tablet by mouth daily    CICLOPIROX (LOPROX) 0.77 % GEL    Apply in between toes twice daily. CITALOPRAM (CELEXA) 10 MG TABLET    TAKE 1 TABLET BY MOUTH DAILY    CLOTRIMAZOLE-BETAMETHASONE (LOTRISONE) 1-0.05 % CREAM    Apply topically 2 times daily. ELASTIC BANDAGES & SUPPORTS (KNEE BRACE/HINGED/LARGE) MISC    Use daily for knee pain    EPINEPHRINE (EPIPEN) 0.3 MG/0.3ML SOAJ INJECTION    Inject 1 mL into the muscle as needed (Anaphylaxis) Use as directed for allergic reaction    LOSARTAN (COZAAR) 50 MG TABLET    TAKE 1.5 tab TABLET BY MOUTH DAILY    MELATONIN 5 MG TABS TABLET    Take 5 mg by mouth daily    PREDNISOLONE SODIUM PHOSPHATE (INFLAMASE FORTE) 1 % OPHTHALMIC SOLUTION    1 drop 4 times daily     ALLERGIES     has No Known Allergies. FAMILY HISTORY     He indicated that his mother is alive. He indicated that his father is alive. SOCIAL HISTORY       Social History     Tobacco Use    Smoking status: Former Smoker     Types: Cigars     Quit date: 2018     Years since quittin.6    Smokeless tobacco: Never Used    Tobacco comment: quit    Substance Use Topics    Alcohol use: Yes     Alcohol/week: 0.0 standard drinks     Comment: occasional    Drug use: No     PHYSICAL EXAM     INITIAL VITALS: /86   Pulse 67   Temp 97.5 °F (36.4 °C) (Oral)   Resp 16   Ht 5' 7\" (1.702 m)   Wt 189 lb (85.7 kg)   SpO2 98%   BMI 29.60 kg/m²    Physical Exam  Vitals and nursing note reviewed. Constitutional:       General: He is not in acute distress. Appearance: He is well-developed.    HENT:      Head: Normocephalic and atraumatic. Eyes:      Conjunctiva/sclera: Conjunctivae normal.   Cardiovascular:      Rate and Rhythm: Normal rate and regular rhythm. Heart sounds: No murmur heard. No friction rub. Pulmonary:      Effort: Pulmonary effort is normal. No respiratory distress. Breath sounds: Normal breath sounds. Abdominal:      Palpations: Abdomen is soft. Tenderness: There is no abdominal tenderness. Musculoskeletal:      Cervical back: Neck supple. Comments: No midline spinal tenderness to palpation right-sided paraspinal muscle spasm with tenderness to palpation   Skin:     General: Skin is warm and dry. Capillary Refill: Capillary refill takes less than 2 seconds. Neurological:      Mental Status: He is alert. Comments: Able to ambulate without difficulty no saddle anesthesia       EMERGENCY DEPARTMENTCOURSE:   Differential diagnosis includes exacerbation of chronic pain fracture dislocation cord compression the patient has no red flags doubt cord compression at this time he has had no trauma doubt fracture dislocation the patient appears to have chronic back pain he states that he has seen a chiropractor in the past for his back pain. He has a palpable muscle spasm believe this is the etiology for his pain I did recommend persistence use of medications such as anti-inflammatories of ibuprofen icing regularly as well as stretches regularly instead of just 1 time and then he needs to perform light duty and let his back heal we will try Robaxin instead of Flexeril to see if this is less sedating for him he understands not to take both muscle relaxers at the same time. Also give the patient ibuprofen lidocaine and a work note.        Vitals:    Vitals:    07/27/21 0133   BP: 135/86   Pulse: 67   Resp: 16   Temp: 97.5 °F (36.4 °C)   TempSrc: Oral   SpO2: 98%   Weight: 189 lb (85.7 kg)   Height: 5' 7\" (1.702 m)       The patient was given the following medications while in the emergency department:  Orders Placed This Encounter   Medications    lidocaine 4 % external patch 1 patch    ibuprofen (ADVIL;MOTRIN) tablet 600 mg    ibuprofen (ADVIL;MOTRIN) 600 MG tablet     Sig: Take 1 tablet by mouth every 6 hours as needed for Pain     Dispense:  20 tablet     Refill:  0    lidocaine 4 % external patch     Sig: Place 1 patch onto the skin daily     Dispense:  30 patch     Refill:  0    methocarbamol (ROBAXIN) 500 MG tablet     Sig: Take 1 tablet by mouth 3 times daily as needed (muscle spasm)     Dispense:  6 tablet     Refill:  0         FINAL IMPRESSION      1. Spasm of muscle         DISPOSITION/PLAN   DISPOSITION Decision To Discharge 07/27/2021 02:07:26 AM      PATIENT REFERRED TO:  Loida Patel MD  [address] [address]  [address] [facility]  [phone]    In 1 week      DISCHARGE MEDICATIONS:  New Prescriptions    IBUPROFEN (ADVIL;MOTRIN) 600 MG TABLET    Take 1 tablet by mouth every 6 hours as needed for Pain    LIDOCAINE 4 % EXTERNAL PATCH    Place 1 patch onto the skin daily    METHOCARBAMOL (ROBAXIN) 500 MG TABLET    Take 1 tablet by mouth 3 times daily as needed (muscle spasm)     Marin Sanchez MD  Attending Emergency Physician    This charting supersedes any ED resident or staff charting and was written using speech recognition software        Marin Sanchez MD  07/27/21 4200

## 2021-08-03 ENCOUNTER — OFFICE VISIT (OUTPATIENT)
Dept: FAMILY MEDICINE CLINIC | Age: 46
End: 2021-08-03
Payer: COMMERCIAL

## 2021-08-03 ENCOUNTER — HOSPITAL ENCOUNTER (OUTPATIENT)
Age: 46
Discharge: HOME | End: 2021-08-03
Payer: COMMERCIAL

## 2021-08-03 ENCOUNTER — HOSPITAL ENCOUNTER (OUTPATIENT)
Dept: GENERAL RADIOLOGY | Age: 46
Discharge: HOME | End: 2021-08-05
Payer: COMMERCIAL

## 2021-08-03 ENCOUNTER — HOSPITAL ENCOUNTER (OUTPATIENT)
Age: 46
Discharge: HOME | End: 2021-08-05
Payer: COMMERCIAL

## 2021-08-03 VITALS
WEIGHT: 185 LBS | SYSTOLIC BLOOD PRESSURE: 120 MMHG | HEART RATE: 68 BPM | TEMPERATURE: 98.6 F | DIASTOLIC BLOOD PRESSURE: 80 MMHG | HEIGHT: 67 IN | OXYGEN SATURATION: 98 % | BODY MASS INDEX: 29.03 KG/M2

## 2021-08-03 DIAGNOSIS — K58.0 IRRITABLE BOWEL SYNDROME WITH DIARRHEA: ICD-10-CM

## 2021-08-03 DIAGNOSIS — G89.29 CHRONIC MIDLINE LOW BACK PAIN WITHOUT SCIATICA: Primary | ICD-10-CM

## 2021-08-03 DIAGNOSIS — M54.50 CHRONIC MIDLINE LOW BACK PAIN WITHOUT SCIATICA: ICD-10-CM

## 2021-08-03 DIAGNOSIS — M54.50 CHRONIC MIDLINE LOW BACK PAIN WITHOUT SCIATICA: Primary | ICD-10-CM

## 2021-08-03 DIAGNOSIS — Z20.2 STD EXPOSURE: ICD-10-CM

## 2021-08-03 DIAGNOSIS — R71.8 ELEVATED HEMATOCRIT: ICD-10-CM

## 2021-08-03 DIAGNOSIS — G89.29 CHRONIC MIDLINE LOW BACK PAIN WITHOUT SCIATICA: ICD-10-CM

## 2021-08-03 DIAGNOSIS — M54.2 CERVICAL SPINE PAIN: ICD-10-CM

## 2021-08-03 DIAGNOSIS — R73.03 PREDIABETES: ICD-10-CM

## 2021-08-03 LAB
ABSOLUTE EOS #: 0.06 K/UL (ref 0–0.4)
ABSOLUTE IMMATURE GRANULOCYTE: ABNORMAL K/UL (ref 0–0.3)
ABSOLUTE LYMPH #: 1.96 K/UL (ref 1–4.8)
ABSOLUTE MONO #: 0.32 K/UL (ref 0.1–1.3)
BASOPHILS # BLD: 0 % (ref 0–2)
BASOPHILS ABSOLUTE: 0 K/UL (ref 0–0.2)
BILIRUBIN URINE: NEGATIVE
COLOR: YELLOW
COMMENT UA: NORMAL
DIFFERENTIAL TYPE: ABNORMAL
EOSINOPHILS RELATIVE PERCENT: 2 % (ref 0–4)
ESTIMATED AVERAGE GLUCOSE: 126 MG/DL
GLUCOSE URINE: NEGATIVE
HBA1C MFR BLD: 6 % (ref 4–6)
HCT VFR BLD CALC: 41.4 % (ref 41–53)
HEMOGLOBIN: 14.2 G/DL (ref 13.5–17.5)
IMMATURE GRANULOCYTES: ABNORMAL %
KETONES, URINE: NEGATIVE
LEUKOCYTE ESTERASE, URINE: NEGATIVE
LYMPHOCYTES # BLD: 61 % (ref 24–44)
MCH RBC QN AUTO: 31.3 PG (ref 26–34)
MCHC RBC AUTO-ENTMCNC: 34.3 G/DL (ref 31–37)
MCV RBC AUTO: 91.3 FL (ref 80–100)
MONOCYTES # BLD: 10 % (ref 1–7)
MORPHOLOGY: NORMAL
NITRITE, URINE: NEGATIVE
NRBC AUTOMATED: ABNORMAL PER 100 WBC
PDW BLD-RTO: 14.6 % (ref 11.5–14.9)
PH UA: 5.5 (ref 5–8)
PLATELET # BLD: 222 K/UL (ref 150–450)
PLATELET ESTIMATE: ABNORMAL
PMV BLD AUTO: 8 FL (ref 6–12)
PROTEIN UA: NEGATIVE
RBC # BLD: 4.54 M/UL (ref 4.5–5.9)
RBC # BLD: ABNORMAL 10*6/UL
SEG NEUTROPHILS: 27 % (ref 36–66)
SEGMENTED NEUTROPHILS ABSOLUTE COUNT: 0.86 K/UL (ref 1.3–9.1)
SPECIFIC GRAVITY UA: 1.03 (ref 1–1.03)
TURBIDITY: CLEAR
URINE HGB: NEGATIVE
UROBILINOGEN, URINE: NORMAL
WBC # BLD: 3.2 K/UL (ref 3.5–11)
WBC # BLD: ABNORMAL 10*3/UL

## 2021-08-03 PROCEDURE — 85025 COMPLETE CBC W/AUTO DIFF WBC: CPT

## 2021-08-03 PROCEDURE — 83036 HEMOGLOBIN GLYCOSYLATED A1C: CPT

## 2021-08-03 PROCEDURE — 1036F TOBACCO NON-USER: CPT | Performed by: FAMILY MEDICINE

## 2021-08-03 PROCEDURE — 87591 N.GONORRHOEAE DNA AMP PROB: CPT

## 2021-08-03 PROCEDURE — G8417 CALC BMI ABV UP PARAM F/U: HCPCS | Performed by: FAMILY MEDICINE

## 2021-08-03 PROCEDURE — G8427 DOCREV CUR MEDS BY ELIG CLIN: HCPCS | Performed by: FAMILY MEDICINE

## 2021-08-03 PROCEDURE — 72100 X-RAY EXAM L-S SPINE 2/3 VWS: CPT

## 2021-08-03 PROCEDURE — 99213 OFFICE O/P EST LOW 20 MIN: CPT | Performed by: FAMILY MEDICINE

## 2021-08-03 PROCEDURE — 81003 URINALYSIS AUTO W/O SCOPE: CPT

## 2021-08-03 PROCEDURE — 87491 CHLMYD TRACH DNA AMP PROBE: CPT

## 2021-08-03 PROCEDURE — 36415 COLL VENOUS BLD VENIPUNCTURE: CPT

## 2021-08-03 RX ORDER — BACLOFEN 10 MG/1
10 TABLET ORAL 2 TIMES DAILY PRN
Qty: 30 TABLET | Refills: 0 | Status: SHIPPED | OUTPATIENT
Start: 2021-08-03 | End: 2022-07-18 | Stop reason: ALTCHOICE

## 2021-08-03 SDOH — ECONOMIC STABILITY: FOOD INSECURITY: WITHIN THE PAST 12 MONTHS, YOU WORRIED THAT YOUR FOOD WOULD RUN OUT BEFORE YOU GOT MONEY TO BUY MORE.: NEVER TRUE

## 2021-08-03 SDOH — ECONOMIC STABILITY: INCOME INSECURITY: IN THE LAST 12 MONTHS, WAS THERE A TIME WHEN YOU WERE NOT ABLE TO PAY THE MORTGAGE OR RENT ON TIME?: NO

## 2021-08-03 SDOH — ECONOMIC STABILITY: TRANSPORTATION INSECURITY
IN THE PAST 12 MONTHS, HAS THE LACK OF TRANSPORTATION KEPT YOU FROM MEDICAL APPOINTMENTS OR FROM GETTING MEDICATIONS?: NO

## 2021-08-03 SDOH — ECONOMIC STABILITY: HOUSING INSECURITY
IN THE LAST 12 MONTHS, WAS THERE A TIME WHEN YOU DID NOT HAVE A STEADY PLACE TO SLEEP OR SLEPT IN A SHELTER (INCLUDING NOW)?: NO

## 2021-08-03 SDOH — ECONOMIC STABILITY: TRANSPORTATION INSECURITY
IN THE PAST 12 MONTHS, HAS LACK OF TRANSPORTATION KEPT YOU FROM MEETINGS, WORK, OR FROM GETTING THINGS NEEDED FOR DAILY LIVING?: NO

## 2021-08-03 SDOH — ECONOMIC STABILITY: FOOD INSECURITY: WITHIN THE PAST 12 MONTHS, THE FOOD YOU BOUGHT JUST DIDN'T LAST AND YOU DIDN'T HAVE MONEY TO GET MORE.: NEVER TRUE

## 2021-08-03 ASSESSMENT — ENCOUNTER SYMPTOMS
SHORTNESS OF BREATH: 0
WHEEZING: 0
COUGH: 0
CHEST TIGHTNESS: 0
ABDOMINAL DISTENTION: 0
PHOTOPHOBIA: 0
BACK PAIN: 1
SINUS PAIN: 0

## 2021-08-03 ASSESSMENT — SOCIAL DETERMINANTS OF HEALTH (SDOH): HOW HARD IS IT FOR YOU TO PAY FOR THE VERY BASICS LIKE FOOD, HOUSING, MEDICAL CARE, AND HEATING?: NOT HARD AT ALL

## 2021-08-03 NOTE — PROGRESS NOTES
Visit Information    Have you changed or started any medications since your last visit including any over-the-counter medicines, vitamins, or herbal medicines? no   Are you having any side effects from any of your medications? -  no  Have you stopped taking any of your medications? Is so, why? -  no    Have you seen any other physician or provider since your last visit? No  Have you had any other diagnostic tests since your last visit? No  Have you been seen in the emergency room and/or had an admission to a hospital since we last saw you? Yes - Records Obtained  Have you had your routine dental cleaning in the past 6 months? no    Have you activated your [employer] account? If not, what are your barriers?  Yes     Patient Care Team:  Evi Solorzano MD as PCP - General (Family Medicine)  Evi Solorzano MD as PCP - [facility]    Medical History Review  Past Medical, Family, and Social History reviewed and does contribute to the patient presenting condition    Health Maintenance   Topic Date Due    Colon cancer screen colonoscopy  09/01/2019    Flu vaccine (1) 09/01/2021    A1C test (Diabetic or Prediabetic)  01/16/2022    Potassium monitoring  01/16/2022    Creatinine monitoring  01/16/2022    Lipid screen  01/16/2026    DTaP/Tdap/Td vaccine (2 - Td or Tdap) 05/31/2028    COVID-19 Vaccine  Completed    Hepatitis C screen  Completed    HIV screen  Completed    Hepatitis A vaccine  Aged Out    Hepatitis B vaccine  Aged Out    Hib vaccine  Aged Out    Meningococcal (ACWY) vaccine  Aged Out    Pneumococcal 0-64 years Vaccine  Aged Out

## 2021-08-03 NOTE — PROGRESS NOTES
Chief Complaint   Patient presents with   [employer] ED Follow-up     neck and back pain -not better     Health Maintenance     colonoscopy due every 3 yrs          Tess Levine  here today for follow up on chronic medical problems, go over labs and/or diagnostic studies, and medication refills. ED Follow-up (neck and back pain -not better ) and Health Maintenance (colonoscopy due every 3 yrs )      HPI : Patient is here for ER follow-up also went to urgent care. Patient reports he had neck pain, went to ER and urgent care was given muscle relaxant methocarbamol and also lidocaine patches. Patient reports he missed his work yesterday because his pain was not improving. He is using lidocaine patches and ibuprofen. Patient reports the pain is more in the neck and lower back. He is renovating his house and is using his arms more. Patient reports he feels pain on the right side of the shoulder near the scapular region. He also feels numbness denies any weakness and tingling. He never had any pain in the neck. He has chronic pain in lower back never had any x-rays done. Patient takes ibuprofen lidocaine patches reports that is helping. Patient reports he needs work note for 3 days will return on Thursday for        /80   Pulse 68   Temp 98.6 °F (37 °C)   Ht 5' 7\" (1.702 m)   Wt 185 lb (83.9 kg)   SpO2 98%   BMI 28.98 kg/m²    Body mass index is 28.98 kg/m². Wt Readings from Last 3 Encounters:   08/03/21 185 lb (83.9 kg)   07/27/21 189 lb (85.7 kg)   02/04/21 192 lb (87.1 kg)        [x]Negative depression screening. PHQ Scores 2/4/2021 5/31/2018   PHQ2 Score 0 1   PHQ9 Score 0 1      []1-4 = Minimal depression   []5-9 = Milddepression   []10-14 = Moderate depression   []15-19 = Moderately severe depression   []20-27 = Severe depression    Discussed testing with the patient and all questions fully answered.     Hospital Outpatient Visit on 01/16/2021   Component Date Value Ref Range Status    WBC 01/16/2021 4.2  3.5 - 11.0 k/uL Final    RBC 01/16/2021 4.57  4.5 - 5.9 m/uL Final    Hemoglobin 01/16/2021 14.1  13.5 - 17.5 g/dL Final    Hematocrit 01/16/2021 40.2* 41 - 53 % Final    MCV 01/16/2021 88.0  80 - 100 fL Final    MCH 01/16/2021 30.8  26 - 34 pg Final    MCHC 01/16/2021 35.0  31 - 37 g/dL Final    RDW 01/16/2021 13.6  11.5 - 14.9 % Final    Platelets 96/02/8138 452* 150 - 450 k/uL Final    MPV 01/16/2021 7.1  6.0 - 12.0 fL Final    NRBC Automated 01/16/2021 NOT REPORTED  per 100 WBC Final    Differential Type 01/16/2021 NOT REPORTED   Final    Immature Granulocytes 01/16/2021 NOT REPORTED  0 % Final    Absolute Immature Granulocyte 01/16/2021 NOT REPORTED  0.00 - 0.30 k/uL Final    WBC Morphology 01/16/2021 NOT REPORTED   Final    RBC Morphology 01/16/2021 NOT REPORTED   Final    Platelet Estimate 08/41/4603 NOT REPORTED   Final    Seg Neutrophils 01/16/2021 30* 36 - 66 % Final    Lymphocytes 01/16/2021 56* 24 - 44 % Final    Atypical Lymphocytes 01/16/2021 5  % Final    Monocytes 01/16/2021 8* 1 - 7 % Final    Eosinophils % 01/16/2021 1  0 - 4 % Final    Basophils 01/16/2021 0  0 - 2 % Final    Segs Absolute 01/16/2021 1.26* 1.3 - 9.1 k/uL Final    Absolute Lymph # 01/16/2021 2.35  1.0 - 4.8 k/uL Final    Atypical Lymphocytes Absolute 01/16/2021 0.21  k/uL Final    Absolute Mono # 01/16/2021 0.34  0.1 - 1.3 k/uL Final    Absolute Eos # 01/16/2021 0.04  0.0 - 0.4 k/uL Final    Basophils Absolute 01/16/2021 0.00  0.0 - 0.2 k/uL Final    Morphology 01/16/2021 Normal   Final    Glucose 01/16/2021 92  70 - 99 mg/dL Final    BUN 01/16/2021 11  6 - 20 mg/dL Final    CREATININE 01/16/2021 1.03  0.70 - 1.20 mg/dL Final    Bun/Cre Ratio 01/16/2021 NOT REPORTED  9 - 20 Final    Calcium 01/16/2021 9.2  8.6 - 10.4 mg/dL Final    Sodium 01/16/2021 138  135 - 144 mmol/L Final    Potassium 01/16/2021 3.9  3.7 - 5.3 mmol/L Final    Chloride 01/16/2021 103  98 - 107 mmol/L Final  CO2 01/16/2021 28  20 - 31 mmol/L Final    Anion Gap 01/16/2021 7* 9 - 17 mmol/L Final    Alkaline Phosphatase 01/16/2021 63  40 - 129 U/L Final    ALT 01/16/2021 21  5 - 41 U/L Final    AST 01/16/2021 19  <40 U/L Final    Total Bilirubin 01/16/2021 0.25* 0.3 - 1.2 mg/dL Final    Total Protein 01/16/2021 7.0  6.4 - 8.3 g/dL Final    Albumin 01/16/2021 4.1  3.5 - 5.2 g/dL Final    Albumin/Globulin Ratio 01/16/2021 NOT REPORTED  1.0 - 2.5 Final    GFR Non- 01/16/2021 >60  >60 mL/min Final    GFR  01/16/2021 >60  >60 mL/min Final    GFR Comment 01/16/2021        Final    Comment: Average GFR for 38-51 years old:   80 mL/min/1.73sq m  Chronic Kidney Disease:   <60 mL/min/1.73sq m  Kidney failure:   <15 mL/min/1.73sq m              eGFR calculated using average adult body mass. Additional eGFR calculator available at:        [email or web address]            GFR Staging 01/16/2021 NOT REPORTED   Final    Cholesterol 01/16/2021 174  <200 mg/dL Final    Comment:    Cholesterol Guidelines:      <200  Desirable   200-240  Borderline      >240  Undesirable         HDL 01/16/2021 44  >40 mg/dL Final    Comment:    HDL Guidelines:    <40     Undesirable   40-59    Borderline    >59     Desirable         LDL Cholesterol 01/16/2021 121  0 - 130 mg/dL Final    Comment:    LDL Guidelines:     <100    Desirable   100-129   Near to/above Desirable   130-159   Borderline      >159   Undesirable     Direct (measured) LDL and calculated LDL are not interchangeable tests.  Chol/HDL Ratio 01/16/2021 4.0  <5 Final            Triglycerides 01/16/2021 47  <150 mg/dL Final    Comment:    Triglyceride Guidelines:     <150   Desirable   150-199  Borderline   200-499  High     >499   Very high   Based on AHA Guidelines for fasting triglyceride, October 2012.          VLDL 01/16/2021 NOT REPORTED  1 - 30 mg/dL Final    Hemoglobin A1C 01/16/2021 6.4* 4.0 - 6.0 % Final    Estimated Avg Glucose 01/16/2021 137  mg/dL Final    Comment: The ADA and AACC recommend providing the estimated average glucose result to permit better   patient understanding of their HBA1c result.            Most recent labs reviewed:     Lab Results   Component Value Date    WBC 4.2 01/16/2021    HGB 14.1 01/16/2021    HCT 40.2 (L) 01/16/2021    MCV 88.0 01/16/2021     (H) 01/16/2021       @BRIEFLAB(NA,K,CL,CO2,BUN,CREATININE,GLUCOSE,CALCIUM)@     Lab Results   Component Value Date    ALT 21 01/16/2021    AST 19 01/16/2021    ALKPHOS 63 01/16/2021    BILITOT 0.25 (L) 01/16/2021       Lab Results   Component Value Date    TSH 1.78 08/18/2016       Lab Results   Component Value Date    CHOL 174 01/16/2021    CHOL 212 (H) 01/21/2020    CHOL 188 06/18/2018     Lab Results   Component Value Date    TRIG 47 01/16/2021    TRIG 55 01/21/2020    TRIG 75 06/18/2018     Lab Results   Component Value Date    HDL 44 01/16/2021    HDL 63 01/21/2020    HDL 59 06/18/2018     Lab Results   Component Value Date    LDLCHOLESTEROL 121 01/16/2021    LDLCHOLESTEROL 138 (H) 01/21/2020    LDLCHOLESTEROL 114 06/18/2018     Lab Results   Component Value Date    VLDL NOT REPORTED 01/16/2021    VLDL NOT REPORTED 01/21/2020    VLDL NOT REPORTED 06/18/2018     Lab Results   Component Value Date    CHOLHDLRATIO 4.0 01/16/2021    CHOLHDLRATIO 3.4 01/21/2020    CHOLHDLRATIO 3.2 06/18/2018       Lab Results   Component Value Date    LABA1C 6.4 (H) 01/16/2021       No results found for: AWRTOACV83    No results found for: FOLATE    No results found for: IRON, TIBC, FERRITIN    Lab Results   Component Value Date    VITD25 23.8 (L) 11/26/2018             Current Outpatient Medications   Medication Sig Dispense Refill    baclofen (LIORESAL) 10 MG tablet Take 1 tablet by mouth 2 times daily as needed (pain) 30 tablet 0    ibuprofen (ADVIL;MOTRIN) 600 MG tablet Take 1 tablet by mouth every 6 hours as needed for Pain 20 tablet 0    lidocaine 4 % external patch Place 1 patch onto the skin daily 30 patch 0    cetirizine (ZYRTEC) 10 MG tablet TAKE 1 TABLET BY MOUTH DAILY 30 tablet 3    losartan (COZAAR) 50 MG tablet TAKE 1.5 tab TABLET BY MOUTH DAILY 90 tablet 1    citalopram (CELEXA) 10 MG tablet TAKE 1 TABLET BY MOUTH DAILY 30 tablet 3    clotrimazole-betamethasone (LOTRISONE) 1-0.05 % cream Apply topically 2 times daily. 45 g 0    prednisoLONE sodium phosphate (INFLAMASE FORTE) 1 % ophthalmic solution 1 drop 4 times daily      EPINEPHrine (EPIPEN) 0.3 MG/0.3ML SOAJ injection Inject 1 mL into the muscle as needed (Anaphylaxis) Use as directed for allergic reaction 2 each 0    Elastic Bandages & Supports (KNEE BRACE/HINGED/LARGE) MISC Use daily for knee pain 1 each 0    Blood Pressure KIT Dx: HTN. Needs automatic blood pressure machine to monitor his blood pressure. 1 kit 0    Ciclopirox (LOPROX) 0.77 % gel Apply in between toes twice daily. 1 Tube 3    Cholecalciferol (VITAMIN D3) 1000 units TABS Take 1 tablet by mouth daily 90 tablet 1    melatonin 5 MG TABS tablet Take 5 mg by mouth daily       No current facility-administered medications for this visit. Social History     Socioeconomic History    Marital status:      Spouse name: Not on file    Number of children: Not on file    Years of education: Not on file    Highest education level: Not on file   Occupational History    Not on file   Tobacco Use    Smoking status: Former Smoker     Types: Cigars     Quit date: 2018     Years since quittin.7    Smokeless tobacco: Never Used    Tobacco comment: quit 2016   Substance and Sexual Activity    Alcohol use:  Yes     Alcohol/week: 0.0 standard drinks     Comment: occasional    Drug use: No    Sexual activity: Not on file   Other Topics Concern    Not on file   Social History Narrative    Not on file     Social Determinants of Health     Financial Resource Strain: Low Risk     Difficulty of Paying Living Expenses: Not hard at all   Food Insecurity: No Food Insecurity    Worried About [address] [employer] in the Last Year: Never true    Ran Out of Food in the Last Year: Never true   Transportation Needs: No Transportation Needs    Lack of Transportation (Medical): No    Lack of Transportation (Non-Medical): No   Physical Activity:     Days of Exercise per Week:     Minutes of Exercise per Session:    Stress:     Feeling of Stress :    Social Connections:     Frequency of Communication with Friends and Family:     Frequency of Social Gatherings with Friends and Family:     Attends Buddhism Services:     Active Member of Clubs or Organizations:     Attends Club or Organization Meetings:     Marital Status:    Intimate Partner Violence:     Fear of Current or Ex-Partner:     Emotionally Abused:     Physically Abused:     Sexually Abused:      Counseling given: Not Answered  Comment: quit 2016        Family History   Problem Relation Age of Onset    Hypertension Mother     Diabetes Mother              -rest of complaints with corresponding details per ROS    The patient's past medical, surgical, social, and family history as well as his current medications and allergies were reviewed as documented intoday's encounter. Review of Systems   Constitutional: Positive for activity change. Negative for appetite change, chills, diaphoresis, fatigue and unexpected weight change. HENT: Negative for congestion, hearing loss, nosebleeds, postnasal drip and sinus pain. Eyes: Negative for photophobia and visual disturbance. Respiratory: Negative for cough, chest tightness, shortness of breath and wheezing. Cardiovascular: Negative for chest pain, palpitations and leg swelling. Gastrointestinal: Negative for abdominal distention. Endocrine: Negative for polyuria. Genitourinary: Negative for difficulty urinating.    Musculoskeletal: Positive for arthralgias, back pain, gait problem, neck pain and neck stiffness. Negative for myalgias. Neurological: Positive for numbness. Negative for dizziness, tremors, weakness, light-headedness and headaches. Psychiatric/Behavioral: Negative for agitation, decreased concentration, dysphoric mood and hallucinations. The patient is not nervous/anxious. Physical Exam  Vitals and nursing note reviewed. Constitutional:       Appearance: Normal appearance. He is obese. HENT:      Head: Normocephalic. Nose: Nose normal.   Eyes:      Extraocular Movements: Extraocular movements intact. Pupils: Pupils are equal, round, and reactive to light. Neck:      Thyroid: No thyroid mass or thyromegaly. Cardiovascular:      Rate and Rhythm: Normal rate and regular rhythm. Heart sounds: Normal heart sounds, S1 normal and S2 normal.   Pulmonary:      Effort: No bradypnea. Breath sounds: Normal breath sounds. No decreased breath sounds, wheezing, rhonchi or rales. Musculoskeletal:      Cervical back: Spasms and tenderness present. No deformity or bony tenderness. Pain with movement present. Decreased range of motion. Lumbar back: Spasms present. No edema, deformity or bony tenderness. Decreased range of motion. Positive left straight leg raise test. Negative right straight leg raise test.   Lymphadenopathy:      Cervical: No cervical adenopathy. Right cervical: No deep cervical adenopathy. Skin:     General: Skin is warm. Neurological:      Mental Status: He is alert and oriented to person, place, and time. Cranial Nerves: Cranial nerves are intact. No cranial nerve deficit or dysarthria. Motor: Motor function is intact. No weakness. Gait: Gait normal.   Psychiatric:         Attention and Perception: Attention and perception normal.         Mood and Affect: Mood and affect normal.             ASSESSMENT AND PLAN      1.  Chronic midline low back pain without sciatica  Chronic pain we will do x-ray, muscle LDLCHOLESTEROL 121 01/16/2021    (goal LDL reduction with dx if diabetes is 50% LDL reduction)      PHQ Scores 2/4/2021 5/31/2018   PHQ2 Score 0 1   PHQ9 Score 0 1     Interpretation of Total Score Depression Severity: 1-4 = Minimal depression, 5-9 = Mild depression, 10-14 = Moderate depression, 15-19 = Moderately severe depression, 20-27 = Severe depression    The patient'spast medical, surgical, social, and family history as well as his   current medications and allergies were reviewed as documented in today's encounter. Medications, labs, diagnostic studies, consultations andfollow-up as documented in this encounter. No follow-ups on file. Patient wasseen with total face to face time of 20 minutes. More than 50% of this visit was counseling and education. Future Appointments   Date Time Provider Angelina Manzo   9/20/2021  8:00 AM Duyen White MD [email or web address]     This note was completed by using the assistance of a speech-recognition program. However, inadvertent computerized transcription errors may be present. Althoughevery effort was made to ensure accuracy, no guarantees can be provided that every mistake has been identified and corrected by editing.   Electronically signed by Duyen White MD on 8/3/2021  12:02 PM

## 2021-08-03 NOTE — PATIENT INSTRUCTIONS
Patient Education        Neck: Exercises  Introduction  Here are some examples of exercises for you to try. The exercises may be suggested for a condition or for rehabilitation. Start each exercise slowly. Ease off the exercises if you start to have pain. You will be told when to start these exercises and which ones will work best for you. How to do the exercises  Neck stretch   1. This stretch works best if you keep your shoulder down as you lean away from it. To help you remember to do this, start by relaxing your shoulders and lightly holding on to your thighs or your chair. 2. Tilt your head toward your shoulder and hold for 15 to 30 seconds. Let the weight of your head stretch your muscles. 3. If you would like a little added stretch, use your hand to gently and steadily pull your head toward your shoulder. For example, keeping your right shoulder down, lean your head to the left. 4. Repeat 2 to 4 times toward each shoulder. Diagonal neck stretch   1. Turn your head slightly toward the direction you will be stretching, and tilt your head diagonally toward your chest and hold for 15 to 30 seconds. 2. If you would like a little added stretch, use your hand to gently and steadily pull your head forward on the diagonal.  3. Repeat 2 to 4 times toward each side. Dorsal glide stretch   The dorsal glide stretches the back of the neck. If you feel pain, do not glide so far back. Some people find this exercise easier to do while lying on their backs with an ice pack on the neck. 1. Sit or stand tall and look straight ahead. 2. Slowly tuck your chin as you glide your head backward over your body  3. Hold for a count of 6, and then relax for up to 10 seconds. 4. Repeat 8 to 12 times. Chest and shoulder stretch   1. Sit or stand tall and glide your head backward as in the dorsal glide stretch. 2. Raise both arms so that your hands are next to your ears.   3. Take a deep breath, and as you breathe out, lower your elbows down and behind your back. You will feel your shoulder blades slide down and together, and at the same time you will feel a stretch across your chest and the front of your shoulders. 4. Hold for about 6 seconds, and then relax for up to 10 seconds. 5. Repeat 8 to 12 times. Strengthening: Hands on head   1. Move your head backward, forward, and side to side against gentle pressure from your hands, holding each position for about 6 seconds. 2. Repeat 8 to 12 times. Follow-up care is a key part of your treatment and safety. Be sure to make and go to all appointments, and call your doctor if you are having problems. It's also a good idea to know your test results and keep a list of the medicines you take. Where can you learn more? Go to [email or web address]. org and sign in to your [employer] account. Enter P975 in the MobilePaks box to learn more about \"Neck: Exercises. \"     If you do not have an account, please click on the \"Sign Up Now\" link. Current as of: November 16, 2020               Content Version: 12.9  © 1557-2215 [employer], [employer]. Care instructions adapted under license by [facility] ([facility]). If you have questions about a medical condition or this instruction, always ask your healthcare professional. [address] any warranty or liability for your use of this information. Patient Education        Low Back Pain: Exercises  Introduction  Here are some examples of exercises for you to try. The exercises may be suggested for a condition or for rehabilitation. Start each exercise slowly. Ease off the exercises if you start to have pain. You will be told when to start these exercises and which ones will work best for you. How to do the exercises  Press-up   1. Lie on your stomach, supporting your body with your forearms. 2. Press your elbows down into the floor to raise your upper back.  As you do this, relax your stomach muscles and allow your back to arch without using your back muscles. As your press up, do not let your hips or pelvis come off the floor. 3. Hold for 15 to 30 seconds, then relax. 4. Repeat 2 to 4 times. Alternate arm and leg (bird dog) exercise   Do this exercise slowly. Try to keep your body straight at all times, and do not let one hip drop lower than the other. 1. Start on the floor, on your hands and knees. 2. Tighten your belly muscles. 3. Raise one leg off the floor, and hold it straight out behind you. Be careful not to let your hip drop down, because that will twist your trunk. 4. Hold for about 6 seconds, then lower your leg and switch to the other leg. 5. Repeat 8 to 12 times on each leg. 6. Over time, work up to holding for 10 to 30 seconds each time. 7. If you feel stable and secure with your leg raised, try raising the opposite arm straight out in front of you at the same time. Knee-to-chest exercise   1. Lie on your back with your knees bent and your feet flat on the floor. 2. Bring one knee to your chest, keeping the other foot flat on the floor (or keeping the other leg straight, whichever feels better on your lower back). 3. Keep your lower back pressed to the floor. Hold for at least 15 to 30 seconds. 4. Relax, and lower the knee to the starting position. 5. Repeat with the other leg. Repeat 2 to 4 times with each leg. 6. To get more stretch, put your other leg flat on the floor while pulling your knee to your chest.    Curl-ups   1. Lie on the floor on your back with your knees bent at a 90-degree angle. Your feet should be flat on the floor, about 12 inches from your buttocks. 2. Cross your arms over your chest. If this bothers your neck, try putting your hands behind your neck (not your head), with your elbows spread apart. 3. Slowly tighten your belly muscles and raise your shoulder blades off the floor.   4. Keep your head in line with your body, and do not press your chin to your chest.  5. Hold this position for 1 or 2 seconds, then slowly lower yourself back down to the floor. 6. Repeat 8 to 12 times. Pelvic tilt exercise   1. Lie on your back with your knees bent. 2. \"Brace\" your stomach. This means to tighten your muscles by pulling in and imagining your belly button moving toward your spine. You should feel like your back is pressing to the floor and your hips and pelvis are rocking back. 3. Hold for about 6 seconds while you breathe smoothly. 4. Repeat 8 to 12 times. Heel dig bridging   1. Lie on your back with both knees bent and your ankles bent so that only your heels are digging into the floor. Your knees should be bent about 90 degrees. 2. Then push your heels into the floor, squeeze your buttocks, and lift your hips off the floor until your shoulders, hips, and knees are all in a straight line. 3. Hold for about 6 seconds as you continue to breathe normally, and then slowly lower your hips back down to the floor and rest for up to 10 seconds. 4. Do 8 to 12 repetitions. Hamstring stretch in doorway   1. Lie on your back in a doorway, with one leg through the open door. 2. Slide your leg up the wall to straighten your knee. You should feel a gentle stretch down the back of your leg. 3. Hold the stretch for at least 15 to 30 seconds. Do not arch your back, point your toes, or bend either knee. Keep one heel touching the floor and the other heel touching the wall. 4. Repeat with your other leg. 5. Do 2 to 4 times for each leg. Hip flexor stretch   1. Kneel on the floor with one knee bent and one leg behind you. Place your forward knee over your foot. Keep your other knee touching the floor. 2. Slowly push your hips forward until you feel a stretch in the upper thigh of your rear leg. 3. Hold the stretch for at least 15 to 30 seconds. Repeat with your other leg. 4. Do 2 to 4 times on each side. Wall sit   1.  Stand with your back 10 to 12

## 2021-08-04 ENCOUNTER — NURSE TRIAGE (OUTPATIENT)
Dept: OTHER | Facility: CLINIC | Age: 46
End: 2021-08-04

## 2021-08-04 DIAGNOSIS — D72.819 LEUKOPENIA, UNSPECIFIED TYPE: Primary | ICD-10-CM

## 2021-08-04 LAB
C. TRACHOMATIS DNA ,URINE: NEGATIVE
N. GONORRHOEAE DNA, URINE: NEGATIVE
SPECIMEN DESCRIPTION: NORMAL

## 2021-08-04 NOTE — TELEPHONE ENCOUNTER
Reason for Disposition   [1] Caller has NON-URGENT medication question about med that PCP prescribed AND [2] triager unable to answer question    Answer Assessment - Initial Assessment Questions  1. NAME of MEDICATION: \"What medicine are you calling about? \"      Baclofen    2. QUESTION: Brittney Kong is your question? \"      Patient experiencing upset stomach and diarrhea starting at 0200 8/4/2021 after taking first dose of baclofen    3. PRESCRIBING HCP: \"Who prescribed it? \" Reason: if prescribed by specialist, call should be referred to that group. Dr. Fili Garcia    4. SYMPTOMS: \"Do you have any symptoms? \"      Diarrhea and upset stomach    5. SEVERITY: If symptoms are present, ask \"Are they mild, moderate or severe? \"      Diarrhea about 4 times since 0200    6. PREGNANCY:  \"Is there any chance that you are pregnant? \" \"When was your last menstrual period? \"      n/a    Protocols used: MEDICATION QUESTION CALL-ADULT-    Received call from [address] [place] [address] at [facility] with [employer] Complaint. Brief description of triage: Patient started on baclofen yesterday, began experiencing diarrhea and upset stomach after taking first dose, would like to change medication    Triage indicates for patient to Receive callback by PCP within 24 hours. Advised patient to call back by 18 8/5/2021 if he has not heard from anyone    Care advice provided, patient verbalizes understanding; denies any other questions or concerns; instructed to call back for any new or worsening symptoms. Attention Provider: Thank you for allowing me to participate in the care of your patient. The patient was connected to triage in response to information provided to the [facility]. Please do not respond through this encounter as the response is not directed to a shared pool.

## 2021-08-05 ENCOUNTER — TELEPHONE (OUTPATIENT)
Dept: FAMILY MEDICINE CLINIC | Age: 46
End: 2021-08-05

## 2021-08-05 NOTE — TELEPHONE ENCOUNTER
----- Message from Jayce Garcia sent at 8/4/2021  5:15 PM EDT -----  Subject: Message to Provider    QUESTIONS  Information for Provider? Pt was prescribe a new medication for his back   baclofen the old medication was methocarbamol 500 mg , the one he is   currently on is messing with his stomach. He has had diarrhea since he   took the new medicine he got yesterday for back reannaams wants to go back   to the old medication he got at ER. He also want to get his work note   extended till Monday   ---------------------------------------------------------------------------  --------------  [address]  What is the best way for the office to contact you? OK to leave message on   voicemail  Preferred Call Back Phone Number? [phone]  ---------------------------------------------------------------------------  --------------  SCRIPT ANSWERS  Relationship to Patient?  Self

## 2021-08-19 ENCOUNTER — TELEPHONE (OUTPATIENT)
Dept: FAMILY MEDICINE CLINIC | Age: 46
End: 2021-08-19

## 2021-08-19 DIAGNOSIS — M54.50 CHRONIC MIDLINE LOW BACK PAIN WITHOUT SCIATICA: Primary | ICD-10-CM

## 2021-08-19 DIAGNOSIS — G89.29 CHRONIC PAIN OF RIGHT ANKLE: ICD-10-CM

## 2021-08-19 DIAGNOSIS — G89.29 CHRONIC MIDLINE LOW BACK PAIN WITHOUT SCIATICA: Primary | ICD-10-CM

## 2021-08-19 DIAGNOSIS — M25.571 CHRONIC PAIN OF RIGHT ANKLE: ICD-10-CM

## 2021-08-19 DIAGNOSIS — Z98.890 H/O COLONOSCOPY WITH POLYPECTOMY: Primary | ICD-10-CM

## 2021-08-19 DIAGNOSIS — Z86.010 H/O COLONOSCOPY WITH POLYPECTOMY: Primary | ICD-10-CM

## 2021-08-19 NOTE — TELEPHONE ENCOUNTER
----- Message from Jossy Caller sent at 8/19/2021 10:21 AM EDT -----  Subject: Referral Request    QUESTIONS   Reason for referral request? Patient is calling for referral to physical   therapy for back, shoulder, hip and neck. Has the physician seen you for this condition before? No   Preferred Specialist (if applicable)? Do you already have an appointment scheduled? No  Additional Information for Provider?   ---------------------------------------------------------------------------  --------------  CALL BACK INFO  What is the best way for the office to contact you? OK to leave message on   voicemail  Preferred Call Back Phone Number?  [phone]

## 2021-08-19 NOTE — TELEPHONE ENCOUNTER
----- Message from Judy Ndiaye sent at 8/19/2021 10:22 AM EDT -----  Subject: Referral Request    QUESTIONS   Reason for referral request? Patient is requesting a referral to   Gastroenterology for a Colonoscopy. Has the physician seen you for this condition before? No   Preferred Specialist (if applicable)? Do you already have an appointment scheduled? No  Additional Information for Provider?   ---------------------------------------------------------------------------  --------------  CALL BACK INFO  What is the best way for the office to contact you? OK to leave message on   voicemail  Preferred Call Back Phone Number?  [phone]

## 2021-08-24 ENCOUNTER — HOSPITAL ENCOUNTER (OUTPATIENT)
Dept: PHYSICAL THERAPY | Age: 46
Setting detail: THERAPIES SERIES
Discharge: HOME | End: 2021-08-24
Payer: MEDICARE

## 2021-08-24 PROCEDURE — 97162 PT EVAL MOD COMPLEX 30 MIN: CPT

## 2021-08-24 PROCEDURE — 97110 THERAPEUTIC EXERCISES: CPT

## 2021-08-26 NOTE — PROGRESS NOTES
[facility]  [address] [place] [address]. [address]         Phone: [phone]       Fax: [phone]     Physical Therapy Spine Evaluation    Date:  2021  Patient: Azalea Ardon  : 1975  MRN: [id number]  Physician: Chiara Hutchins     Insurance: [employer] Advantage  Medical Diagnosis: low back pain without sciatica M54.5, ADD neck pain M54.2  Rehab Codes: M54.5, M54.2  Onset Date: 21  Next 's appt.: PRN  Visit Count:    Cancel/No Show: 0/0    Subjective: Patient with complaints of neck pain and (R) trapezius pain and low back pain and pain into the (R) glut region. Patient noted onset of symptoms in 2021. 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 Patient currently limited with fully turning head, with complaints of electrical surges in body intermittently. Patient with intermittent back pain limiting bending ADLs. Patient did noted improved symptoms previously with treatment to the neck.   CC: complaints of neck pain, complaints of radiating pain into the (R) neck/trap region, intermittent complaints of back pain with (R) hip pain  HPI: insidious onset prompted referral 21    PMHx: [] Unremarkable [] Diabetes [] HTN  [] Pacemaker   [] MI/Heart Problems [] Cancer [] Arthritis [] Other:              [x] Refer to full medical chart  In EPIC     Comorbidities:   [] Obesity [] Dialysis  [] Other:   [] Asthma/COPD [] Dementia [] Other:   [] Stroke [] Sleep apnea [] Other:   [] Vascular disease [] Rheumatic disease [] Other:   See Epic for comorbidities    Tests: [] X-Ray: [] MRI:  [] Other:    Medications: [x] Refer to full medical record [] None [] Other:  Allergies:      [x] Refer to full medical record [] None [] Other:    Function:  Hand Dominance  [] Right  [] Left  Working:  [] Normal Duty  [] Light Duty  [] Off D/T Condition  [] Retired  [] Not Employed []  Disability  [] Other:           Return to work:   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    ADL/IADL Previous level of function Current level of function Who currently assists the patient with task   Bathing  [] Independent  [] Assist [] Independent  [] Assist    Dress/grooming [] Independent  [] Assist [] Independent  [] Assist    Transfer/mobility [] Independent  [] Assist [] Independent  [] Assist    Feeding [] Independent  [] Assist [] Independent  [] Assist    Toileting [] Independent  [] Assist [] Independent  [] Assist    Driving [] Independent  [] Assist [] Independent  [] Assist    Housekeeping [] Independent  [] Assist [x] Independent  [] Assist Difficulty with bending ADLs- back, difficulty with repetitive use of arms- neck/UE   Grocery shop/meal prep [] Independent  [] Assist [x] Independent  [] Assist Difficulty prolonged walking     Gait Prior level of function Current level of function    [] Independent  [] Assist [] Independent  [] Assist   Device: [] Independent [] Independent    [] Straight Cane [] Quad cane [] Straight Cane [] Quad cane    [] Standard walker [] Rolling walker   [] 4 wheeled walker [] Standard walker [] Rolling walker   [] 4 wheeled walker    [] Wheelchair [] Wheelchair     Pain:  [x] Yes  [] No Location: trapezius, intermittently into (R) arm; low back, into (R) posterior lateral hip Pain Rating: (0-10 scale) 7;7/10  Pain altered Tx:  [] Yes  [] No  Action:    Symptoms:  [] Improving [] Worsening [] Same  Better:  [] AM    [] PM    [] Sit    [] Rise/Sit    []Stand    [] Walk    [] Lying    [] Other:  Worse: [] AM    [] PM    [] Sit    [] Rise/Sit    []Stand    [] Walk    [] Lying    [] Bend                             [] Valsalva    [] Other:  Sleep: [] OK    [] Disturbed      Objective:      STRENGTH  STRENGTH  ROM    Left Right  Left Right Cervical    C5 Shld Abd   L1-2 Hip Flex   Flexion 40   Shld Flexion   Hip Abd   Extension 60 C7 pain Shld IR   L3-4 Knee Ext   Rotation L 55 % R 55    Shld ER   L4 Ankle DF   Sidebend L 30 R 20 $   C6 Elb Flex   L5 EHL   Retraction    C7 Elb Ext   S1 Plant. Flex   Lumbar    C8 EPL   Abdominals   Flexion 1/2 distal from patella to ankles   T1 Fing Abd   Erector Spinae   Extension  8 degrees (R) sided pain         Rotation L  R         Sidebend L R         UE/LE                                                            $ = (R) neck pain with (L) rotation and (R) SB      TESTS (+/-) LEFT RIGHT Not Tested   SLR [] sit [] supine   []   Hamstring (SLR)   []   SKTC   []   DKTC   []   Slump/Dural   []   SI JT   []   TAMARA   []   Joint Mobility   []   Cerv. Comp   []   Cerv. Distraction   []   Cerv. Alar/Transverse   []   Vertebral Artery   []   Adsons   []   Valiant Lapping   []   Carmita Tests ? Pain ? Pain No Change Not Tested   RFIS [] [] [x] []   TERESA [] [] [x] []   RFIL [] [] [x] []   REIL [] [] [x] []   Rep Prot [] [] [x] []   Rep Retract [] [] [x] []   No effect for repeated cervical or repeated lumbar testing- patient given extension for back and retraction for neck.      OBSERVATION No Deficit Deficit Not Tested Comments   Posture       Forward Head [] [] []    Rounded Shoulders [] [] []    Kyphosis [] [] []    Lordosis [] [] []    Lateral Shift [] [] []    Scoliosis [] [] []    Iliac Crest [] [] []    PSIS [] [] []    ASIS [] [] []    Genu Valgus [] [] []    Genu Varus [] [] []    Genu Recurvatum [] [] []    Pronation [] [] []    Supination [] [] []    Leg Length Discrp [] [] []    Slumped Sitting [] [] []    Palpation [] [] [] Min TTP (R) trapezius, C7 region,  Min TTP L3-L5, (R) SI/gluteal   Sensation [] [] []    Edema [] [] []    Neurological [] [] []      Comments:                                [place] Fall Risk Assessment    Risk Factor Scale  Score   History of Falls [x] Yes  [] No 25  0 25   Secondary Diagnosis [] Yes  [] No 15  0 0   Ambulatory Aid [] Furniture  [] Crutches/cane/walker  [] None/bedrest/wheelchair/nurse 30  15  0 0   IV/Heparin Lock [] Yes  [] No 20  0 0   Gait/Transferring [] Impaired  [] Weak  [] Normal/bedrest/immobile 20  10  0 0   Mental Status [] Forgets limitations  [] Oriented to own ability 15  0 0      Total:25     Based on the Assessment score: check the appropriate box. []  No intervention needed   Low =   Score of 0-24    [x]  Use standard prevention interventions Moderate =  Score of 24-44   [x] Give patient handout and discuss fall prevention strategies   [x] Establish goal of education for patient/family RE: fall prevention strategies    []  Use high risk prevention interventions High = Score of 45 and higher   [] Give patient handout and discuss fall prevention strategies   [] Establish goal of education for patient/family Re: fall prevention strategies   [] Discuss lifeline / other resources    Electronically signed by:   Ny Hazel PT  Date: 8/29/2021      Assessment: Patient with limited tolerance of quickly turning head, repetitively lifting/carrying and use of arms overhead, and limited with cervical ROM with presence of radicular symptoms consistent with possible cervical disc dernagement. Patient with limited tolerance of repetitively bending and presence of (R) hip pain consistent with possible lumbar disc derangement. Patient would continue to benefit from skilled physical therapy services in order to: improve mobility, reduce radicular symptoms. Problems:    [] ? Back Pain: 7/10 in low back and into (R) hip. [] ? Cervical Pain: 7/10 in neck and (R) trapezius    [] ? ROM:  Limited lumbar flexion and extension mobility, limited with cervical extension, (R) SB, and (L) rotation at end range     [] ? Function: Limited with functional tolerance of turning head quickly, with overhead use of arms, with repetitively lifting/carrying.   Patient with limited tolerance of bending ADLs due to back/hip pain     STG: (to be met in 6 treatments)  1. ? Pain: Improved pain levels to 2-3/10 at worst  2. ? ROM: Improved cervical ROM to ~ (B) with end range discomfort only. Patient with improved lumbar mobility to ankles for flexion and 10 degrees extension  3. ? Function: Patient with improved tolerance of turning head by 50% self report, improved overhead use of the arms by 50% self report, improved bending ADLs by 50% self report. 4. Independent with Home Exercise Programs  5. Demonstrate Knowledge of fall prevention  LTG: (to be met in 12 treatments)  6. ? Pain: Improved pain levels to 0-1/10 at worst  7. ? ROM: Improved cervical ROM to ~ (B) with no pain. Patient with improved lumbar mobility to ankles for flexion and 15 degrees extension  8. ? Function: Patient with improved tolerance of turning head by 80% self report, improved overhead use of the arms by 80% self report, improved bending ADLs by 80% self report. 9. Independent with Home Exercise Programs  Patient goals: reduce ache, find out exercises to be less prone to injury    Functional Assessment Used: optimal for bending, lifting/carrying, prolonged walking 9/3 50% limited  Current Status Score:  Goal Status Sore: 5/3 or less    Evaluation Complexity:  History (Personal factors, comorbidities) [] 0 [x] 1-2 [] 3+   Exam (limitations, restrictions) [] 1-2 [x] 3 [] 4+   Clinical presentation (progression) [] Stable [x] Evolving  [] Unstable   Decision Making [] Low [x] Moderate [] High    [] Low Complexity [x] Moderate Complexity [] High Complexity       Rehab Potential:  [] Good  [x] Fair  [] Poor   Suggested Professional Referral:  [x] No  [] Yes:  Barriers to Goal Achievement[de-identified]  [x] No  [] Yes:  Domestic Concerns:  [x] No  [] Yes:    Pt. Education:  [x] Plans/Goals, Risks/Benefits discussed  [x] Home exercise program  Method of Education: [x] Verbal  [] Demo  [x] Written  Comprehension of Education:  [] Verbalizes understanding. [] Demonstrates understanding. [] Needs Review.   [] Demonstrates/verbalizes understanding of HEP/Ed previously given. Treatment Plan:  [x] Therapeutic Exercise   42471  [] Iontophoresis: 4 mg/mL Dexamethasone Sodium Phosphate  mAmin  12742   [] Therapeutic Activity  97031 [] Vasopneumatic cold with compression  29857    [] Gait Training   97376 [] Ultrasound   66867   [x] Neuromuscular Re-education  98178 [x] Electrical Stimulation Unattended  09859 PRN   [x] Manual Therapy  56070 [] Electrical Stimulation Attended  47995   [x] Instruction in HEP  [] Lumbar/Cervical Traction  45523   [] Aquatic Therapy   16879 [] Cold/hotpack    [] Massage   45141      [] Dry Needling, 1 or 2 muscles  90389   [] Biofeedback, first 15 minutes   85340  [] Biofeedback, additional 15 minutes   17895 [] Dry Needling, 3 or more muscles  46769       []  Medication allergies reviewed for use of    Dexamethasone Sodium Phosphate 4mg/ml     with iontophoresis treatments. Pt is not allergic.     Frequency:  2-3 x/week for 12 visits    Todays Treatment:  Modalities:   Precautions:  Exercises:  Exercise Reps/ Time Weight/ Level Comments   Cervical retraction 15  HEP   Rotation (B) 10 x 5\"  HEP   sidebending (B) 10 x 5\"  HEP   Standing lumbar extension 15x  HEP         Other:    Specific Instructions for next treatment:    Treatment Charges: Mins Units   [x] Evaluation       []  Low       [x]  Moderate       []  High 30 1   []  Modalities     []  Ther Exercise 25 2   []  Manual Therapy     []  Ther Activities     []  Aquatics     []  Neuromuscular     []  Gait Training     []  Dry Needling           1-2 muscles     []  Dry Needling           3 or more muscles     [] Vasocompression     []  Other 55 3     TOTAL TREATMENT TIME: 55    Time in: 4430      Time out: 1140    Electronically signed by: Wendie Cole PT        Physician Signature:________________________________Date:__________________  By signing above or cosigning this note, I have reviewed this plan of care and certify a need for medically necessary

## 2021-08-27 ENCOUNTER — HOSPITAL ENCOUNTER (OUTPATIENT)
Dept: PHYSICAL THERAPY | Age: 46
Setting detail: THERAPIES SERIES
Discharge: HOME | End: 2021-08-27
Payer: MEDICARE

## 2021-08-27 PROCEDURE — 97110 THERAPEUTIC EXERCISES: CPT

## 2021-08-31 DIAGNOSIS — R73.03 PREDIABETES: ICD-10-CM

## 2021-08-31 DIAGNOSIS — K58.0 IRRITABLE BOWEL SYNDROME WITH DIARRHEA: ICD-10-CM

## 2021-08-31 RX ORDER — CITALOPRAM 10 MG/1
10 TABLET ORAL DAILY
Qty: 30 TABLET | Refills: 3 | Status: SHIPPED | OUTPATIENT
Start: 2021-08-31 | End: 2022-01-21

## 2021-08-31 RX ORDER — METFORMIN HYDROCHLORIDE 500 MG/1
500 TABLET, EXTENDED RELEASE ORAL
Qty: 60 TABLET | Refills: 3 | Status: SHIPPED | OUTPATIENT
Start: 2021-08-31 | End: 2021-09-13

## 2021-09-01 ENCOUNTER — HOSPITAL ENCOUNTER (OUTPATIENT)
Dept: PHYSICAL THERAPY | Age: 46
Setting detail: THERAPIES SERIES
Discharge: HOME | End: 2021-09-01
Payer: MEDICARE

## 2021-09-01 PROCEDURE — 97110 THERAPEUTIC EXERCISES: CPT

## 2021-09-01 PROCEDURE — 97140 MANUAL THERAPY 1/> REGIONS: CPT

## 2021-09-01 NOTE — FLOWSHEET NOTE
[address] [facility] Outpatient Physical Therapy   [id number] [facility] [address]   Phone: [phone]   Fax: [phone]    Physical Therapy Daily Treatment Note      Date:  2021  Patient Name:  Henrietta Duncan    :  1975  MRN: [id number]  Physician: Clement Duggan                               Insurance: [employer] Advantage  Medical Diagnosis: low back pain without sciatica M54.5, ADD neck pain M54.2            Rehab Codes: M54.5, M54.2  Onset Date: 21                     Next 's appt.: PRN  Visit# / total visits: 3/12  Cancels/No Shows: 0/0    Subjective:  Pt presenting to therapy session with inc in pain in neck and upper back. Pt states he hasn't done much today so his low back isn't bothering him as much. Pain:  [x] Yes  [] No Location: R sided Neck and Thoracic Back Pain Rating: (0-10 scale) 5/10  Pain altered Tx:  [] No  [] Yes  Action:  Comments:    Objective:  Modalities:   Precautions:  Exercises:  Exercise Reps/ Time Weight/ Level Completed  Today Comments   Seated Cervical ROM F/E, Rotation, Lat side bending 10x5\"  x    Supine Chin Tucks 10x5\"  x With towel roll   Supine Scapular Retraction 2x10  x    LTR 10x10\" hold      AISHA 3'      Press Ups 2x10      Standing Back Ext 10x5\"                    Other:  Manual: 30' cervical traction, PROM of cervical spine, SB stretch and trigger point to B UT and hypervolt to B UT. Specific Instructions for next treatment:      Assessment: [x] Progressing toward goals. [] No change. [] Other:    [] Patient would continue to benefit from skilled physical therapy services in order to: Reduce Cervical and Thoracic Back tightness and improve mobility. : Focus this session on neck and thoracic spine as pt states his low back is not in as much pain. Began tx with manual therapy to dec pain and tightness in neck and UT with moderate relief felt post manual therapy.  Pt tolerated exercises well with improved ROM and tolerance noted. Pt educated on inc awareness with posture to improve postural control. STG: (to be met in 6 treatments)  1. ? Pain: Improved pain levels to 2-3/10 at worst  2. ? ROM: Improved cervical ROM to ~ (B) with end range discomfort only. Patient with improved lumbar mobility to ankles for flexion and 10 degrees extension  3. ? Function: Patient with improved tolerance of turning head by 50% self report, improved overhead use of the arms by 50% self report, improved bending ADLs by 50% self report. 4. Independent with Home Exercise Programs  5. Demonstrate Knowledge of fall prevention  LTG: (to be met in 12 treatments)  6. ? Pain: Improved pain levels to 0-1/10 at worst  7. ? ROM: Improved cervical ROM to ~ (B) with no pain. Patient with improved lumbar mobility to ankles for flexion and 15 degrees extension  8. ? Function: Patient with improved tolerance of turning head by 80% self report, improved overhead use of the arms by 80% self report, improved bending ADLs by 80% self report. 9. Independent with Home Exercise Programs  Patient goals: reduce ache, find out exercises to be less prone to injury     Pt. Education:  [x] Yes  [] No  [] Reviewed Prior HEP/Ed  Method of Education: [x] Verbal  [x] Demo  [] Written  Comprehension of Education:  [] Verbalizes understanding. [x] Demonstrates understanding. [x] Needs review. [] Demonstrates/verbalizes HEP/Ed previously given. Plan: [x] Continue per plan of care.    [] Other:      Treatment Charges: Mins Units   []  Modalities     [x]  Ther Exercise 15 1   [x]  Manual Therapy 30 2   []  Ther Activities     []  Aquatics     []  Neuromuscular     [] Vasocompression     [] Gait Training     [] Dry needling        [] 1 or 2 muscles        [] 3 or more muscles     []  Other     Total Treatment time 45 3     Time In:1100         Time Out: 0341    Electronically signed by:  Uvaldo Melendez PTA

## 2021-09-02 ENCOUNTER — HOSPITAL ENCOUNTER (OUTPATIENT)
Dept: PHYSICAL THERAPY | Age: 46
Setting detail: THERAPIES SERIES
Discharge: HOME | End: 2021-09-02
Payer: MEDICARE

## 2021-09-02 PROCEDURE — 97110 THERAPEUTIC EXERCISES: CPT

## 2021-09-02 NOTE — FLOWSHEET NOTE
[facility]   [facility]   Phone: [phone]   Fax: [phone]    Physical Therapy Daily Treatment Note      Date:  2021  Patient Name:  Mikayla Camejo    :  1975  MRN: [id number]  Physician: Kevin Browning                               Insurance: [employer] Advantage  Medical Diagnosis: low back pain without sciatica M54.5, ADD neck pain M54.2            Rehab Codes: M54.5, M54.2  Onset Date: 21                     Next 's appt.: PRN  Visit# / total visits:   Cancels/No Shows: 0/0    Subjective:  Pt reported to therapy complaining of Mid to Low Back tightness only is currently having no cervical or upper back pain since last Tx. Patient back SXs have been inconsistent in location. 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 Pain:  [x] Yes  [] No Location: LB and Thoracic Back Pain Rating: (0-10 scale) 5/10  Pain altered Tx:  [] No  [] Yes  Action:   Comments:    Objective:  Modalities:   Precautions:  Exercises:  Exercise Reps/ Time Weight/ Level Completed  Today Comments   Seated Cervical ROM F/E, Rotation, Lat side bending 10x5\"      Supine Chin Tucks 10x5\"   With towel roll   Supine Scapular Retraction 2x10  x    LTR 10x10\" hold  x    AISHA 3'  x    Press Ups 2x10  x    Standing Back Ext 10x5\"  x    SKTC 3x30\"  x    DKTC 3x30\"  x    Supine HS stretch 3x30\"  x    Seated Back Flex 3x30\"  x    Other:  Manual: Due no SXs reported held Manual Cervical   Specific Instructions for next treatment:      Assessment: [x] Progressing toward goals. [] No change. [] Other:    [] Patient would continue to benefit from skilled physical therapy services in order to: Reduce Cervical and Thoracic Back tightness and improve mobility. : Focus this session on thoracic and lumbar spine as pt states his upper back and neck has no.   Initiated and educated patient in additional flexion based activity to improve overall mobility. Pt tolerated exercises well with improved ROM and tolerance noted. Pt educated on posture awareness and body mechanics due to work activity. STG: (to be met in 6 treatments)  1. ? Pain: Improved pain levels to 2-3/10 at worst  2. ? ROM: Improved cervical ROM to ~ (B) with end range discomfort only. Patient with improved lumbar mobility to ankles for flexion and 10 degrees extension  3. ? Function: Patient with improved tolerance of turning head by 50% self report, improved overhead use of the arms by 50% self report, improved bending ADLs by 50% self report. 4. Independent with Home Exercise Programs  5. Demonstrate Knowledge of fall prevention  LTG: (to be met in 12 treatments)  6. ? Pain: Improved pain levels to 0-1/10 at worst  7. ? ROM: Improved cervical ROM to ~ (B) with no pain. Patient with improved lumbar mobility to ankles for flexion and 15 degrees extension  8. ? Function: Patient with improved tolerance of turning head by 80% self report, improved overhead use of the arms by 80% self report, improved bending ADLs by 80% self report. 9. Independent with Home Exercise Programs  Patient goals: reduce ache, find out exercises to be less prone to injury     Pt. Education:  [x] Yes  [] No  [] Reviewed Prior HEP/Ed  Method of Education: [x] Verbal  [x] Demo  [] Written  Comprehension of Education:  [] Verbalizes understanding. [x] Demonstrates understanding. [x] Needs review. [] Demonstrates/verbalizes HEP/Ed previously given. Plan: [x] Continue per plan of care.    [] Other:      Treatment Charges: Mins Units   []  Modalities     [x]  Ther Exercise 45 3   []  Manual Therapy     []  Ther Activities     []  Aquatics     []  Neuromuscular     [] Vasocompression     [] Gait Training     [] Dry needling        [] 1 or 2 muscles        [] 3 or more muscles     []  Other     Total Treatment time 45 3     Time In:1100         Time Out: 0911 34 76 [phone]    Electronically signed by:  Edwige Trent, PTA

## 2021-09-07 ENCOUNTER — HOSPITAL ENCOUNTER (OUTPATIENT)
Dept: PHYSICAL THERAPY | Age: 46
Setting detail: THERAPIES SERIES
Discharge: HOME | End: 2021-09-07
Payer: MEDICARE

## 2021-09-07 PROCEDURE — 97140 MANUAL THERAPY 1/> REGIONS: CPT

## 2021-09-07 PROCEDURE — 97110 THERAPEUTIC EXERCISES: CPT

## 2021-09-07 NOTE — FLOWSHEET NOTE
[facility] Outpatient Physical Therapy   [id number] [facility] [address]   Phone: [phone]   Fax: [phone]    Physical Therapy Daily Treatment Note      Date:  2021  Patient Name:  Julio Head    :  1975  MRN: [id number]  Physician: Eddie Vazquez                               Insurance: [employer]  Medical Diagnosis: low back pain without sciatica M54.5, ADD neck pain M54.2            Rehab Codes: M54.5, M54.2  Onset Date: 21                     Next 's appt.: PRN  Visit# / total visits:   Cancels/No Shows: 0/0    Subjective: Pt was 13' late to session. Pt reporting pain in L side of neck and thoracic region of back. Pt reports he moved a 27' ladder over the weekend. Pain:  [x] Yes  [] No Location: LB and Thoracic Back Pain Rating: (0-10 scale) 4/10  Pain altered Tx:  [] No  [] Yes  Action:   Comments:    Objective:  Modalities:   Precautions:  Exercises:  Exercise Reps/ Time Weight/ Level Completed  Today Comments   Seated Cervical ROM F/E, Rotation, Lat side bending 10x5\"      Supine Chin Tucks 10x5\"   With towel roll   Supine Scapular Retraction 2x10      LTR 10x10\" hold      Prone laying  3'  x    AISHA 3'      Press Ups 2x10  x  no change in symptoms   Standing Back Ext 10x5\"      SKTC 3x30\"      DKTC 3x30\"      Supine HS stretch 3x30\"      Seated Back Flex 3x30\"      Posterior capsule stretch 2x 30\"  x Added    Thoracic stretch 2x 30\"  x Added           Other:  Manual: PA mobs to thoracic and low back and Kinesio tape applied to low back 10'  Specific Instructions for next treatment:      Assessment: [x] Progressing toward goals. [] No change. [] Other:    [] Patient would continue to benefit from skilled physical therapy services in order to: Reduce Cervical and Thoracic Back tightness and improve mobility. : Began session with prone laying and extension based exercises with minimal relief noted.   Followed with PA mobs to low and thoracic back to dec tightness and pain. Applied kinesio tape to pt's back in 'H' formation from insertion to origin on paraspinals and horizontal piece were pt located pain. Kinesio tape to dec pain and promote circulation under fascia. STG: (to be met in 6 treatments)  1. ? Pain: Improved pain levels to 2-3/10 at worst  2. ? ROM: Improved cervical ROM to ~ (B) with end range discomfort only. Patient with improved lumbar mobility to ankles for flexion and 10 degrees extension  3. ? Function: Patient with improved tolerance of turning head by 50% self report, improved overhead use of the arms by 50% self report, improved bending ADLs by 50% self report. 4. Independent with Home Exercise Programs  5. Demonstrate Knowledge of fall prevention  LTG: (to be met in 12 treatments)  6. ? Pain: Improved pain levels to 0-1/10 at worst  7. ? ROM: Improved cervical ROM to ~ (B) with no pain. Patient with improved lumbar mobility to ankles for flexion and 15 degrees extension  8. ? Function: Patient with improved tolerance of turning head by 80% self report, improved overhead use of the arms by 80% self report, improved bending ADLs by 80% self report. 9. Independent with Home Exercise Programs  Patient goals: reduce ache, find out exercises to be less prone to injury     Pt. Education:  [x] Yes  [] No  [] Reviewed Prior HEP/Ed  Method of Education: [x] Verbal  [x] Demo  [] Written  Comprehension of Education:  [] Verbalizes understanding. [x] Demonstrates understanding. [x] Needs review. [] Demonstrates/verbalizes HEP/Ed previously given. Plan: [x] Continue per plan of care.    [] Other:      Treatment Charges: Mins Units   []  Modalities     [x]  Ther Exercise 20 1   [x]  Manual Therapy 10 1   []  Ther Activities     []  Aquatics     []  Neuromuscular     [] Vasocompression     [] Gait Training     [] Dry needling        [] 1 or 2 muscles        [] 3 or more muscles     []  Other     Total Treatment

## 2021-09-09 ENCOUNTER — HOSPITAL ENCOUNTER (OUTPATIENT)
Dept: PHYSICAL THERAPY | Age: 46
Setting detail: THERAPIES SERIES
Discharge: HOME | End: 2021-09-09
Payer: MEDICARE

## 2021-09-09 PROCEDURE — 97110 THERAPEUTIC EXERCISES: CPT

## 2021-09-09 PROCEDURE — 97140 MANUAL THERAPY 1/> REGIONS: CPT

## 2021-09-09 NOTE — FLOWSHEET NOTE
Performed manual cervical traction, suboccipital release and trigger point to B upper traps to dec tightness and pain and improve cervical mobility. Followed with PA mobs to lower/middle back with inc tightness felt on R side of low back. Completed session with KT tape in H formation. STG: (to be met in 6 treatments)  1. ? Pain: Improved pain levels to 2-3/10 at worst  2. ? ROM: Improved cervical ROM to ~ (B) with end range discomfort only. Patient with improved lumbar mobility to ankles for flexion and 10 degrees extension  3. ? Function: Patient with improved tolerance of turning head by 50% self report, improved overhead use of the arms by 50% self report, improved bending ADLs by 50% self report. 4. Independent with Home Exercise Programs  5. Demonstrate Knowledge of fall prevention  LTG: (to be met in 12 treatments)  6. ? Pain: Improved pain levels to 0-1/10 at worst  7. ? ROM: Improved cervical ROM to ~ (B) with no pain. Patient with improved lumbar mobility to ankles for flexion and 15 degrees extension  8. ? Function: Patient with improved tolerance of turning head by 80% self report, improved overhead use of the arms by 80% self report, improved bending ADLs by 80% self report. 9. Independent with Home Exercise Programs  Patient goals: reduce ache, find out exercises to be less prone to injury     Pt. Education:  [x] Yes  [] No  [] Reviewed Prior HEP/Ed  Method of Education: [x] Verbal  [x] Demo  [] Written  Comprehension of Education:  [] Verbalizes understanding. [x] Demonstrates understanding. [x] Needs review. [] Demonstrates/verbalizes HEP/Ed previously given. Plan: [x] Continue per plan of care.    [] Other:      Treatment Charges: Mins Units   []  Modalities     [x]  Ther Exercise 30 2   [x]  Manual Therapy 20 1   []  Ther Activities     []  Aquatics     []  Neuromuscular     [] Vasocompression     [] Gait Training     [] Dry needling        [] 1 or 2 muscles        [] 3 or more muscles     []  Other     Total Treatment time 50 3     Time In:  0900     Time Out: 6235    Electronically signed by:  Mattie Larson PTA

## 2021-09-10 PROBLEM — M25.462 EFFUSION, LEFT KNEE: Status: ACTIVE | Noted: 2021-09-10

## 2021-09-10 PROBLEM — S83.412A GRADE 1 INJURY OF MEDIAL COLLATERAL LIGAMENT OF LEFT KNEE: Status: ACTIVE | Noted: 2021-09-10

## 2021-09-11 NOTE — PROGRESS NOTES
[facility]  [facility] [place]  [address]  [address] [address]  Dept: Alvin Ballard Casandra Robles (:  1975) is a 55 y.o. male. Patient is here for evaluation of the following chief complaint(s):  Chief Complaint   Patient presents with    Back Pain     Pt helping a little so far    Hypertension        SUBJECTIVE/OBJECTIVE:  ADRIANO Horton is a 55 y.o. male patient. Patient is an established patient of Dr. Evi Solorzano . Patient has a known history of hypertension, hyperlipidemia, chronic neck pain, chronic low back pain, prediabetes, seasonal allergies, vitamin D deficiency, mild depression, arthritis of the hip, and trapezius muscle spasms. .    CHRONIC CERVICAL/THORACIC/LUMBAR PAIN  Patient scheduled this appointment today to discuss his chronic pain conditions which he has had due to recurring injury at work. 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 Patient had mentioned several episodes where he would lift and twist his body and cause some neck and back pain. Patient also reported a few other episodes where he got pinned by another worker while holding a fridge which caused him to hit his back on the wall which caused an even more severe pain on his neck thoracic and lumbar spine. Patient is working on getting Workmen's Compensation severe was recently terminated his job. He is now doing other work doing lighter type of labor. Recent x-ray of the lumbar spine did not show any fracture or dislocation other than degenerative changes. Patient have always worked a lot of the labor-intensive type of job. Patient is also undergoing physical therapy which she is almost done with. Patient reported some relief with the physical therapy but is almost done with the course. Patient is willing to try PT link after this physical therapy.     FINDINGS:   Lumbar vertebral bodies are normal in height and alignment. No evidence of   fracture. Visualized sacrum is unremarkable. No significant degenerative changes. Impression   No evidence for fracture or malalignment of the lumbar spine     HYPERTENSION  Bri Garcia has a well controlled hypertension. he is currently on cozaar. Patient's most recent BP in the office was stable. he reports stable BP readings at home. Patient denies any adverse reaction to this therapy. he denies any CP, SOB, HA, or palpitations. Patient admits to exercising and adheres to low salt diet. No history of organ damage due to condition noted. Lab Results   Component Value Date/Time    CREATININE 1.03 01/16/2021 12:41 PM     HYPERLIPIDEMIA  Bri Garcia is currently on OTC Fish Oil. Patient denies adverse reaction to this medication. Compliance with treatment thus far has been good. The patient is not known to have coexisting coronary artery disease. The 10-year CVD risk score (Lul, et al., 2008) is: 7.6%    Values used to calculate the score:      Age: 55 years      Sex: Male      Diabetic: No      Tobacco smoker: No      Systolic Blood Pressure: 973 mmHg      Is BP treated: Yes      HDL Cholesterol: 44 mg/dL      Total Cholesterol: 174 mg/dL  Lab Results   Component Value Date/Time    CHOL 174 01/16/2021 12:41 PM    HDL 44 01/16/2021 12:41 PM    LDLCHOLESTEROL 121 01/16/2021 12:41 PM    CHOLHDLRATIO 4.0 01/16/2021 12:41 PM    TRIG 47 01/16/2021 12:41 PM    VLDL NOT REPORTED 01/16/2021 12:41 PM     PREDIABETES  Patient's recent hemoglobin A1c below. Patient admits to genetics and stress. Patient denies any polyphagia, polydipsia, polyuria. We discussed the importance lifestyle changes such as cutting down food/drinks high in carbohydrates and regular exercise to avoid any progression on this condition. We are going to continue to monitor the [facility].  Treatment  Stopped metformin  Lab Results   Component Value Date    LABA1C 6.0 08/03/2021    LABA1C 6.4 (H) 01/16/2021      SEASONAL ALLERGIES  Currently on Zyrtec for some occasional runny nose and nasal congestion. Patient reports this therapy is effective. VITAMIN D  Patient has a known Vitamin D Deficiency. he had a course of supplementation for 12 weeks. he is due to get levels check. We continue to discuss ways to manage this condition. We also discussed ways to improve the levels. Such as learning food groups that are high in Vitamin D. We also discuss that sun exposure is beneficial however, too much sun and sun damage can potentially result in skin cancer. Lab Results   Component Value Date/Time    VITD25 23.8 (L) 11/26/2018 12:01 PM      DEPRESSION AND ANXIETY- in long-term for awhile. Sandro Dubose reported some ongoing issues with depression and anxiety. Symptoms includes psychomotor agitation, racing thoughts and anhedonia. Current therapy includes Celexa, which is working well for him. he denies adverse reaction to current therapy. he also denies suicidal/homicidal ideation, plan or intent. .  PHQ-2 Over the past 2 weeks, how often have you been bothered by any of the following problems? Little interest or pleasure in doing things: Several days  Feeling down, depressed, or hopeless: Not at all  PHQ-2 Score: 1  PHQ-9 Over the past 2 weeks, how often have you been bothered by any of the following problems? Thoughts that you would be better off dead, or of hurting yourself in some way: Not at all  PHQ-9 Total Score: 1   No flowsheet data found. Patient is due for colon cancer screening. Fran Albert denies  nausea, vomiting, diarrhea, constipation, blood in the stool or abdominal pain. We discussed options, she would like to have: Eduardo.       VITAL SIGNS:  Vitals:    09/13/21 1145   BP: 122/84   Pulse: 80   Temp: 97.3 °F (36.3 °C)   TempSrc: Temporal   SpO2: 98%   Weight: 173 lb (78.5 kg)   Height: 5' 7\" (1.702 m)   Estimated body mass index is 27.1 kg/m² as calculated from the following:    Height as of this encounter: 5' 7\" (1.702 m). Weight as of this encounter: 173 lb (78.5 kg). Review of Systems   Constitutional: Positive for activity change. Negative for chills and fever. HENT: Negative. Respiratory: Negative. Negative for shortness of breath and wheezing. Cardiovascular: Negative. Negative for chest pain. Gastrointestinal: Negative for abdominal pain. Endocrine: Negative. Musculoskeletal: Positive for arthralgias, back pain and myalgias. Negative for joint swelling. Allergic/Immunologic: Positive for environmental allergies. Neurological: Negative for headaches. Psychiatric/Behavioral: Negative for sleep disturbance and suicidal ideas. The patient is nervous/anxious. Physical Exam  Vitals and nursing note reviewed. HENT:      Head: Normocephalic. Nose: Nose normal.   Cardiovascular:      Rate and Rhythm: Normal rate and regular rhythm. Pulmonary:      Effort: Pulmonary effort is normal.      Breath sounds: Normal breath sounds. Abdominal:      General: Bowel sounds are normal.      Palpations: Abdomen is soft. Musculoskeletal:      Right shoulder: Tenderness present. No deformity. Left shoulder: Tenderness present. No deformity. Cervical back: No bony tenderness. Decreased range of motion. Thoracic back: No bony tenderness. Decreased range of motion. Lumbar back: Tenderness present. Decreased range of motion. Positive right straight leg raise test.      Right hip: No tenderness. Decreased range of motion. Comments: Trapezius muscles   Skin:     General: Skin is warm and dry. Neurological:      Mental Status: He is alert and oriented to person, place, and time. Psychiatric:         Mood and Affect: Mood is anxious. Speech: Speech is rapid and pressured. Behavior: Behavior normal.         Thought Content: Thought content does not include suicidal ideation.          MEDICAL HISTORY Diagnosis Date    Allergic rhinitis     Anxiety     Cervical spine pain 1/3/2019    Gastric reflux 3/30/2016    Headache(784.0)     Hepatitis     Hyperlipidemia 5/31/2018    Hypertension     IBS (irritable bowel syndrome) 12/17/2014    Insomnia     Iron deficiency anemia     Mild single current episode of major depressive disorder ([facility] [address] 8/2/2018    Pancreatitis     Seasonal allergies       MEDICATIONS  Prior to Visit Medications    Medication Sig Taking? Authorizing Provider   cetirizine (ZYRTEC) 10 MG tablet TAKE 1 TABLET BY MOUTH DAILY Yes CHAIM Landry CNP   vitamin D3 (CHOLECALCIFEROL) 25 MCG (1000 UT) TABS tablet Take 1 tablet by mouth daily Yes CHAIM Landry CNP   citalopram (CELEXA) 10 MG tablet TAKE 1 TABLET BY MOUTH DAILY Yes Thony Chacon MD   ibuprofen (ADVIL;MOTRIN) 600 MG tablet Take 1 tablet by mouth every 6 hours as needed for Pain Yes Aldair Vick MD   losartan (COZAAR) 50 MG tablet TAKE 1.5 tab TABLET BY MOUTH DAILY Yes Thony Chacon MD   clotrimazole-betamethasone (LOTRISONE) 1-0.05 % cream Apply topically 2 times daily. Yes Thony Chacon MD   EPINEPHrine (EPIPEN) 0.3 MG/0.3ML SOAJ injection Inject 1 mL into the muscle as needed (Anaphylaxis) Use as directed for allergic reaction Yes Ashley Fall MD   Elastic Bandages & Supports (KNEE BRACE/HINGED/LARGE) MISC Use daily for knee pain Yes Thony Chacon MD   Blood Pressure KIT Dx: HTN. Needs automatic blood pressure machine to monitor his blood pressure. Yes Thony Chacon MD   Ciclopirox (LOPROX) 0.77 % gel Apply in between toes twice daily.  Yes Farzaneh Vizcarra DPM   baclofen (LIORESAL) 10 MG tablet Take 1 tablet by mouth 2 times daily as needed (pain)  Patient not taking: Reported on 9/13/2021  Thony Chacon MD   melatonin 5 MG TABS tablet Take 5 mg by mouth daily  Patient not taking: Reported on 9/13/2021  Historical Provider, MD     Controlled Substance Monitoring:  Acute and Chronic Pain Monitoring:   No flowsheet data found. ASSESSMENT/PLAN:  1. Essential hypertension  Stable  Continue current therapy. DISCUSSED AND ADVISED TO:  Cut down on your salt intake. Cut down on caffeinated drinks, sports drinks. Instructed to check BP at home regularly. Report for any chest pains, shortness of breath, headaches, and lightheadedness. Call the office if your blood pressure continue to be higher than 140/90 or 90/50.        2. Mixed hyperlipidemia  Stable  Continue therapy. Advised to decrease the consumption of red meats, fried foods, trans fats, sweets, sugary beverages. Advised to increase fish, vegetables, and fruits consumption. Advised to add fiber or OTC supplements in diet. Discussed weight loss which will result in improvement of lipids levels. Advised to increase daily physical activities and add regular exercises. 3. Chronic midline low back pain without sciatica  Failure to Improve  Continue current therapy. DISCUSSED AND ADVISED TO:  Use heat packs 15 to 20 mins every 2-3 hours. Do some back stretches as tolerated. Refer to hand out for instructions. Call for worsening, numbness, weakness. - Amb External Referral To Physical Therapy    4. Cervical spine pain  Stable  Continue current therapy. DISCUSSED and ADVISED TO:  Stay at a healthy weight. Continue exercises/PT  Stretch to help prevent stiffness and to prevent injury before exercise. Gentle forms of yoga help keep joints and muscles flexible. Walk instead of jog, ride a bike, swim, and water exercise. Lift weights as tolerated. strong muscles help reduce stress on joints. Take pain medicines exactly as directed and only as needed. - Amb External Referral To Physical Therapy    5. Prediabetes  Stable  DISCUSSED and ADVISED TO:  Decrease carbohydrates, sugary drinks, desserts   Exercise regularly, as tolerated. Try to lose weight.       6. Seasonal allergies  Stable  Continue with the same therapy ADVISED TO:  Avoid known allergens/irritants. Stop smoking or avoid second hand smoke. Stay hydrated. Report for worsening symptoms    - cetirizine (ZYRTEC) 10 MG tablet; TAKE 1 TABLET BY MOUTH DAILY  Dispense: 30 tablet; Refill: 3    7. Mild single current episode of major depressive disorder (HCC)  Stable  Continue current therapy. DISCUSSED and ADVISED TO:  Not stopping medication suddenly. See the specialist as discussed. Report for feelings of SI, HI, and hallucinations. Go to the ER for increasing urge to hurt yourself. 8. Arthritis of right hip  Failure to Improve  Continue PT  - Amb External Referral To Physical Therapy    9. Trapezius muscle spasm  Failure to Improve  Continue PT    - Amb External Referral To Physical Therapy    10. Colon cancer screening  Recommended    - Cologuard; Future    11. Vitamin D deficiency  Continue Vitamin D supplementation  DISCUSSED AND ADVISED TO:  Foods that contain a lot of vitamin D includes Carman, tuna, and mackerel. Cheese, egg yolks, and beef liver have small amounts of vit D.  Milk, soy drinks, orange juice, yogurt, margarine, and some kinds of cereal have vitamin D added to them. Continue to use sunblock when out in the sun to prevent skin cancer.    - vitamin D3 (CHOLECALCIFEROL) 25 MCG (1000 UT) TABS tablet; Take 1 tablet by mouth daily  Dispense: 90 tablet; Refill: 1     On this date 9/13/2021 I have spent 30 minutes reviewing previous notes, test results and face to face with the patient discussing the diagnosis and importance of compliance with the treatment plan as well as documenting on the day of the visit. Return in about 3 months (around 12/13/2021) for Chronic conditions, Appt w/ Dr. Yuly Rich. This note was completed by using the assistance of a speech-recognition program. However, inadvertent computerized transcription errors may be present.  Although every effort was made to ensure accuracy, no guarantees can be provided that every mistake has been identified and corrected by editing.   Electronically signed by CHAIM Cuenca CNP on 9/10/21 at 11:18 PM EDT     --CHAIM Cuenca CNP

## 2021-09-12 PROBLEM — E78.2 MIXED HYPERLIPIDEMIA: Status: ACTIVE | Noted: 2018-05-31

## 2021-09-13 ENCOUNTER — OFFICE VISIT (OUTPATIENT)
Dept: FAMILY MEDICINE CLINIC | Age: 46
End: 2021-09-13
Payer: MEDICARE

## 2021-09-13 VITALS
SYSTOLIC BLOOD PRESSURE: 122 MMHG | WEIGHT: 173 LBS | BODY MASS INDEX: 27.15 KG/M2 | OXYGEN SATURATION: 98 % | HEIGHT: 67 IN | HEART RATE: 80 BPM | DIASTOLIC BLOOD PRESSURE: 84 MMHG | TEMPERATURE: 97.3 F

## 2021-09-13 DIAGNOSIS — E78.2 MIXED HYPERLIPIDEMIA: ICD-10-CM

## 2021-09-13 DIAGNOSIS — R73.03 PREDIABETES: ICD-10-CM

## 2021-09-13 DIAGNOSIS — M62.838 TRAPEZIUS MUSCLE SPASM: ICD-10-CM

## 2021-09-13 DIAGNOSIS — Z12.11 COLON CANCER SCREENING: ICD-10-CM

## 2021-09-13 DIAGNOSIS — E55.9 VITAMIN D DEFICIENCY: ICD-10-CM

## 2021-09-13 DIAGNOSIS — M54.2 CERVICAL SPINE PAIN: ICD-10-CM

## 2021-09-13 DIAGNOSIS — M54.50 CHRONIC MIDLINE LOW BACK PAIN WITHOUT SCIATICA: ICD-10-CM

## 2021-09-13 DIAGNOSIS — G89.29 CHRONIC MIDLINE LOW BACK PAIN WITHOUT SCIATICA: ICD-10-CM

## 2021-09-13 DIAGNOSIS — I10 ESSENTIAL HYPERTENSION: Primary | ICD-10-CM

## 2021-09-13 DIAGNOSIS — F32.0 MILD SINGLE CURRENT EPISODE OF MAJOR DEPRESSIVE DISORDER (HCC): ICD-10-CM

## 2021-09-13 DIAGNOSIS — M16.11 ARTHRITIS OF RIGHT HIP: ICD-10-CM

## 2021-09-13 DIAGNOSIS — J30.2 SEASONAL ALLERGIES: ICD-10-CM

## 2021-09-13 PROBLEM — F17.290 CIGAR SMOKER: Status: RESOLVED | Noted: 2018-08-02 | Resolved: 2021-09-13

## 2021-09-13 PROCEDURE — 1036F TOBACCO NON-USER: CPT | Performed by: FAMILY MEDICINE

## 2021-09-13 PROCEDURE — 99214 OFFICE O/P EST MOD 30 MIN: CPT | Performed by: FAMILY MEDICINE

## 2021-09-13 PROCEDURE — G8427 DOCREV CUR MEDS BY ELIG CLIN: HCPCS | Performed by: FAMILY MEDICINE

## 2021-09-13 PROCEDURE — G8417 CALC BMI ABV UP PARAM F/U: HCPCS | Performed by: FAMILY MEDICINE

## 2021-09-13 RX ORDER — CETIRIZINE HYDROCHLORIDE 10 MG/1
TABLET ORAL
Qty: 30 TABLET | Refills: 3 | Status: SHIPPED | OUTPATIENT
Start: 2021-09-13 | End: 2022-05-10

## 2021-09-13 RX ORDER — MELATONIN
1000 DAILY
Qty: 90 TABLET | Refills: 1 | Status: SHIPPED | OUTPATIENT
Start: 2021-09-13 | End: 2022-03-15

## 2021-09-13 ASSESSMENT — ENCOUNTER SYMPTOMS
ABDOMINAL PAIN: 0
WHEEZING: 0
BACK PAIN: 1
RESPIRATORY NEGATIVE: 1
SHORTNESS OF BREATH: 0

## 2021-09-13 ASSESSMENT — PATIENT HEALTH QUESTIONNAIRE - PHQ9
2. FEELING DOWN, DEPRESSED OR HOPELESS: 0
SUM OF ALL RESPONSES TO PHQ QUESTIONS 1-9: 1
1. LITTLE INTEREST OR PLEASURE IN DOING THINGS: 1
SUM OF ALL RESPONSES TO PHQ QUESTIONS 1-9: 1
SUM OF ALL RESPONSES TO PHQ QUESTIONS 1-9: 1
9. THOUGHTS THAT YOU WOULD BE BETTER OFF DEAD, OR OF HURTING YOURSELF: 0
SUM OF ALL RESPONSES TO PHQ9 QUESTIONS 1 & 2: 1

## 2021-09-13 NOTE — PATIENT INSTRUCTIONS
Patient Education        Back Stretches: Exercises  Introduction  Here are some examples of exercises for stretching your back. Start each exercise slowly. Ease off the exercise if you start to have pain. Your doctor or physical therapist will tell you when you can start these exercises and which ones will work best for you. How to do the exercises  Overhead stretch   1. Stand comfortably with your feet shoulder-width apart. 2. Looking straight ahead, raise both arms over your head and reach toward the ceiling. Do not allow your head to tilt back. 3. Hold for 15 to 30 seconds, then lower your arms to your sides. 4. Repeat 2 to 4 times. Side stretch   1. Stand comfortably with your feet shoulder-width apart. 2. Raise one arm over your head, and then lean to the other side. 3. Slide your hand down your leg as you let the weight of your arm gently stretch your side muscles. Hold for 15 to 30 seconds. 4. Repeat 2 to 4 times on each side. Press-up   1. Lie on your stomach, supporting your body with your forearms. 2. Press your elbows down into the floor to raise your upper back. As you do this, relax your stomach muscles and allow your back to arch without using your back muscles. As your press up, do not let your hips or pelvis come off the floor. 3. Hold for 15 to 30 seconds, then relax. 4. Repeat 2 to 4 times. Relax and rest   1. Lie on your back with a rolled towel under your neck and a pillow under your knees. Extend your arms comfortably to your sides. 2. Relax and breathe normally. 3. Remain in this position for about 10 minutes. 4. If you can, do this 2 or 3 times each day. Follow-up care is a key part of your treatment and safety. Be sure to make and go to all appointments, and call your doctor if you are having problems. It's also a good idea to know your test results and keep a list of the medicines you take. Where can you learn more? Go to https://qamar.healthCopier How To. org and sign in to your Lastline account. Enter P125 in the Kapitall box to learn more about \"Back Stretches: Exercises. \"     If you do not have an account, please click on the \"Sign Up Now\" link. Current as of: November 16, 2020               Content Version: 12.9  © 0962-6727 Healthwise, Incorporated. Care instructions adapted under license by [facility] ([facility]). If you have questions about a medical condition or this instruction, always ask your healthcare professional. [address] any warranty or liability for your use of this information.

## 2021-09-13 NOTE — PROGRESS NOTES
Visit Information    Have you changed or started any medications since your last visit including any over-the-counter medicines, vitamins, or herbal medicines? no   Are you having any side effects from any of your medications? -  no  Have you stopped taking any of your medications? Is so, why? -  no    Have you seen any other physician or provider since your last visit? Yes - Records Obtained  Have you had any other diagnostic tests since your last visit? Yes - Records Obtained  Have you been seen in the emergency room and/or had an admission to a hospital since we last saw you? No  Have you had your routine dental cleaning in the past 6 months? no    Have you activated your [employer] account? If not, what are your barriers?  Yes     Patient Care Team:  Javed Mcclendon MD as PCP - General (Family Medicine)  Javed Mcclendon MD as PCP - [address] [place] [address]  [facility] Provider    Medical History Review  Past Medical, Family, and Social History reviewed and does contribute to the patient presenting condition    Health Maintenance   Topic Date Due    Colon cancer screen colonoscopy  09/01/2019    Flu vaccine (1) 09/01/2021    Potassium monitoring  01/16/2022    Creatinine monitoring  01/16/2022    A1C test (Diabetic or Prediabetic)  08/03/2022    Lipid screen  01/16/2026    DTaP/Tdap/Td vaccine (2 - Td or Tdap) 05/31/2028    COVID-19 Vaccine  Completed    Hepatitis C screen  Completed    HIV screen  Completed    Hepatitis A vaccine  Aged Out    Hepatitis B vaccine  Aged Out    Hib vaccine  Aged Out    Meningococcal (ACWY) vaccine  Aged Out    Pneumococcal 0-64 years Vaccine  Aged Out

## 2021-09-14 ENCOUNTER — HOSPITAL ENCOUNTER (OUTPATIENT)
Dept: PHYSICAL THERAPY | Age: 46
Setting detail: THERAPIES SERIES
Discharge: HOME | End: 2021-09-14
Payer: MEDICARE

## 2021-09-14 PROCEDURE — 97110 THERAPEUTIC EXERCISES: CPT

## 2021-09-14 PROCEDURE — 97140 MANUAL THERAPY 1/> REGIONS: CPT

## 2021-09-14 NOTE — FLOWSHEET NOTE
[facility]   [address]   Phone: [phone]   Fax: [phone]    Physical Therapy Daily Treatment Note      Date:  2021  Patient Name:  Gee Morales    :  1975  MRN: [id number]  Physician: Guadalupe Germain                               Insurance: [employer]  Medical Diagnosis: low back pain without sciatica M54.5, ADD neck pain M54.2            Rehab Codes: M54.5, M54.2  Onset Date: 21                     Next 's appt.: PRN  Visit# / total visits:   Cancels/No Shows: 0/0    Subjective: Pt reports that KT helped reduce pain symptoms in low back. Pt states he is still having pain in his neck when looking to R side and R shoulder feels tender. Pain:  [x] Yes  [] No Location: base of neck, low back Pain Rating: (0-10 scale) 4-5/10  Pain altered Tx:  [] No  [] Yes  Action:   Comments:    Objective:  Modalities:   Precautions:  Exercises:  Exercise Reps/ Time Weight/ Level Completed  Today Comments   Seated Cervical ROM F/E, Rotation, Lat side bending 10x5\"      Supine Chin Tucks 10x5\"   With towel roll   Supine Scapular Retraction 2x10  x  in seated   LTR 10x10\" hold  x    Prone scapular protraction/retraction 10x2  x Added    Prone laying  3'  x    AISHA 3'      Press Ups 2x10    no change in symptoms   Standing Back Ext 10x5\"      SKTC 3x30\"      DKTC 3x30\"      Supine HS stretch 3x30\"      Supine Active HS stretch 10x2      Supine marching with TrA 10x  x Added    Seated Back Flex 3x30\"      Posterior capsule stretch 2x 30\"   Added    Thoracic stretch 2x 30\"   Added                                Other:  Manual: cervical traction, trigger point and suboccipital release, KT to RUT and R Paraspinals 15'   PA mobs to thoracic and low back and Kinesio tape applied to low back  Specific Instructions for next treatment:      Assessment: [x] Progressing toward goals. [] No change.      [] Other:    [x] Patient would continue to benefit from skilled physical therapy services in order to: Reduce Cervical and Thoracic Back tightness and improve mobility. 9/14/2021: Began tx with manual to cervical spine to dec pain and tightness with improved symptoms noted after. Added prone scapular protraction/retraction and seated scap retraction to inc strength in postural muscles. Mild relief noted at the end of session. Added supine marching with TrA activation to inc core strength and stability with pt feeling the need to stretch into extension after exercise. No inc in pain noted in low back at the end of session. Added KT to R UT and paraspinals this session to dec tightness and pain and improve tolerance to mobility. STG: (to be met in 6 treatments)  1. ? Pain: Improved pain levels to 2-3/10 at worst  2. ? ROM: Improved cervical ROM to ~ (B) with end range discomfort only. Patient with improved lumbar mobility to ankles for flexion and 10 degrees extension  3. ? Function: Patient with improved tolerance of turning head by 50% self report, improved overhead use of the arms by 50% self report, improved bending ADLs by 50% self report. 4. Independent with Home Exercise Programs  5. Demonstrate Knowledge of fall prevention  LTG: (to be met in 12 treatments)  6. ? Pain: Improved pain levels to 0-1/10 at worst  7. ? ROM: Improved cervical ROM to ~ (B) with no pain. Patient with improved lumbar mobility to ankles for flexion and 15 degrees extension  8. ? Function: Patient with improved tolerance of turning head by 80% self report, improved overhead use of the arms by 80% self report, improved bending ADLs by 80% self report. 9. Independent with Home Exercise Programs  Patient goals: reduce ache, find out exercises to be less prone to injury     Pt.  Education:  [x] Yes  [] No  [] Reviewed Prior HEP/Ed  Method of Education: [x] Verbal  [x] Demo  [] Written  Comprehension of Education:  [] Shelby Campbell understanding. [x] Demonstrates understanding. [x] Needs review. [] Demonstrates/verbalizes HEP/Ed previously given. Plan: [x] Continue per plan of care.    [] Other:      Treatment Charges: Mins Units   []  Modalities     [x]  Ther Exercise 30 2   [x]  Manual Therapy 15 1   []  Ther Activities     []  Aquatics     []  Neuromuscular     [] Vasocompression     [] Gait Training     [] Dry needling        [] 1 or 2 muscles        [] 3 or more muscles     []  Other     Total Treatment time 45 3     Time In:  0900     Time Out: 1358    Electronically signed by:  Suzanna Vilallba PTA

## 2021-09-20 ENCOUNTER — HOSPITAL ENCOUNTER (OUTPATIENT)
Dept: PHYSICAL THERAPY | Age: 46
Setting detail: THERAPIES SERIES
Discharge: HOME | End: 2021-09-20
Payer: MEDICARE

## 2021-09-20 PROCEDURE — 97140 MANUAL THERAPY 1/> REGIONS: CPT

## 2021-09-20 PROCEDURE — 97110 THERAPEUTIC EXERCISES: CPT

## 2021-09-27 ENCOUNTER — TELEPHONE (OUTPATIENT)
Dept: FAMILY MEDICINE CLINIC | Age: 46
End: 2021-09-27

## 2021-10-18 ENCOUNTER — HOSPITAL ENCOUNTER (EMERGENCY)
Age: 46
Discharge: HOME | End: 2021-10-19
Attending: EMERGENCY MEDICINE
Payer: MEDICARE

## 2021-10-18 VITALS
BODY MASS INDEX: 28.09 KG/M2 | SYSTOLIC BLOOD PRESSURE: 135 MMHG | DIASTOLIC BLOOD PRESSURE: 82 MMHG | RESPIRATION RATE: 18 BRPM | HEART RATE: 70 BPM | TEMPERATURE: 98.2 F | HEIGHT: 67 IN | OXYGEN SATURATION: 99 % | WEIGHT: 179 LBS

## 2021-10-18 DIAGNOSIS — R09.81 NASAL CONGESTION: ICD-10-CM

## 2021-10-18 DIAGNOSIS — J02.9 ACUTE PHARYNGITIS, UNSPECIFIED ETIOLOGY: Primary | ICD-10-CM

## 2021-10-18 LAB
DIRECT EXAM: NORMAL
Lab: NORMAL
SARS-COV-2, RAPID: NOT DETECTED
SPECIMEN DESCRIPTION: NORMAL
SPECIMEN DESCRIPTION: NORMAL

## 2021-10-18 PROCEDURE — 99284 EMERGENCY DEPT VISIT MOD MDM: CPT

## 2021-10-18 PROCEDURE — 87880 STREP A ASSAY W/OPTIC: CPT

## 2021-10-18 PROCEDURE — 87635 SARS-COV-2 COVID-19 AMP PRB: CPT

## 2021-10-18 PROCEDURE — 6370000000 HC RX 637 (ALT 250 FOR IP): Performed by: EMERGENCY MEDICINE

## 2021-10-18 RX ORDER — IBUPROFEN 600 MG/1
600 TABLET ORAL ONCE
Status: COMPLETED | OUTPATIENT
Start: 2021-10-18 | End: 2021-10-18

## 2021-10-18 RX ADMIN — IBUPROFEN 600 MG: 600 TABLET, FILM COATED ORAL at 23:40

## 2021-10-18 ASSESSMENT — PAIN DESCRIPTION - LOCATION: LOCATION: EAR;JAW

## 2021-10-18 ASSESSMENT — PAIN SCALES - GENERAL
PAINLEVEL_OUTOF10: 4
PAINLEVEL_OUTOF10: 6

## 2021-10-18 ASSESSMENT — PAIN DESCRIPTION - PAIN TYPE: TYPE: ACUTE PAIN

## 2021-10-19 ENCOUNTER — TELEPHONE (OUTPATIENT)
Dept: FAMILY MEDICINE CLINIC | Age: 46
End: 2021-10-19

## 2021-10-19 RX ORDER — FLUTICASONE PROPIONATE 50 MCG
1 SPRAY, SUSPENSION (ML) NASAL DAILY
Qty: 16 G | Refills: 0 | Status: SHIPPED | OUTPATIENT
Start: 2021-10-19 | End: 2022-07-18 | Stop reason: SDUPTHER

## 2021-10-19 RX ORDER — IBUPROFEN 600 MG/1
600 TABLET ORAL EVERY 6 HOURS PRN
Qty: 20 TABLET | Refills: 0 | Status: SHIPPED | OUTPATIENT
Start: 2021-10-19

## 2021-10-19 ASSESSMENT — ENCOUNTER SYMPTOMS
SHORTNESS OF BREATH: 1
SORE THROAT: 1
ABDOMINAL PAIN: 0
VOMITING: 0
COUGH: 1
BACK PAIN: 0
DIARRHEA: 0

## 2021-10-19 NOTE — ED PROVIDER NOTES
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 He denies any fevers but just does not feel very well    REVIEW OF SYSTEMS     Review of Systems   Constitutional: Negative for fever. HENT: Positive for congestion, ear pain and sore throat. Respiratory: Positive for cough and shortness of breath. Cardiovascular: Negative for chest pain. Gastrointestinal: Negative for abdominal pain, diarrhea and vomiting. Genitourinary: Negative for dysuria. Musculoskeletal: Negative for back pain. Skin: Negative for rash. Neurological: Positive for headaches. All other systems reviewed and are negative.     PASTMEDICAL HISTORY     Past Medical History:   Diagnosis Date    Allergic rhinitis     Anxiety     Cervical spine pain 1/3/2019    Gastric reflux 3/30/2016    Headache(784.0)     Hepatitis     Hyperlipidemia 5/31/2018    Hypertension     IBS (irritable bowel syndrome) 12/17/2014    Insomnia     Iron deficiency anemia     Mild single current episode of major depressive disorder ([facility] [address] 8/2/2018    Pancreatitis     Seasonal allergies      SURGICAL HISTORY       Past Surgical History:   Procedure Laterality Date    ANKLE SURGERY  02/23/14    HAND SURGERY  08/02/13    URETHRA SURGERY  1985     CURRENT MEDICATIONS       Previous Medications BACLOFEN (LIORESAL) 10 MG TABLET    Take 1 tablet by mouth 2 times daily as needed (pain)    BLOOD PRESSURE KIT    Dx: HTN. Needs automatic blood pressure machine to monitor his blood pressure. CETIRIZINE (ZYRTEC) 10 MG TABLET    TAKE 1 TABLET BY MOUTH DAILY    CICLOPIROX (LOPROX) 0.77 % GEL    Apply in between toes twice daily. CITALOPRAM (CELEXA) 10 MG TABLET    TAKE 1 TABLET BY MOUTH DAILY    CLOTRIMAZOLE-BETAMETHASONE (LOTRISONE) 1-0.05 % CREAM    Apply topically 2 times daily. ELASTIC BANDAGES & SUPPORTS (KNEE BRACE/HINGED/LARGE) MISC    Use daily for knee pain    EPINEPHRINE (EPIPEN) 0.3 MG/0.3ML SOAJ INJECTION    Inject 1 mL into the muscle as needed (Anaphylaxis) Use as directed for allergic reaction    LOSARTAN (COZAAR) 50 MG TABLET    TAKE 1.5 tab TABLET BY MOUTH DAILY    MELATONIN 5 MG TABS TABLET    Take 5 mg by mouth daily    VITAMIN D3 (CHOLECALCIFEROL) 25 MCG (1000 UT) TABS TABLET    Take 1 tablet by mouth daily     ALLERGIES     has No Known Allergies. FAMILY HISTORY     He indicated that his mother is alive. He indicated that his father is alive. SOCIAL HISTORY       Social History     Tobacco Use    Smoking status: Former Smoker     Types: Cigars     Quit date: 2018     Years since quittin.9    Smokeless tobacco: Never Used    Tobacco comment: quit    Substance Use Topics    Alcohol use: Yes     Alcohol/week: 0.0 standard drinks     Comment: occasional    Drug use: No     PHYSICAL EXAM     INITIAL VITALS: /82   Pulse 70   Temp 98.2 °F (36.8 °C) (Oral)   Resp 18   Ht 5' 7\" (1.702 m)   Wt 179 lb (81.2 kg)   SpO2 99%   BMI 28.04 kg/m²    Physical Exam  Vitals and nursing note reviewed. Constitutional:       General: He is not in acute distress. Appearance: He is well-developed. HENT:      Head: Normocephalic and atraumatic. Right Ear: Tympanic membrane normal.      Left Ear: Tympanic membrane normal.      Nose: Congestion present. Mouth/Throat:      Comments: Clear discharge down the posterior oropharynx with some mild erythema no exudate  Eyes:      Conjunctiva/sclera: Conjunctivae normal.   Cardiovascular:      Rate and Rhythm: Normal rate and regular rhythm. Heart sounds: No murmur heard. No friction rub. Pulmonary:      Effort: Pulmonary effort is normal. No respiratory distress. Breath sounds: Normal breath sounds. No stridor. No wheezing or rhonchi. Abdominal:      General: There is no distension. Palpations: Abdomen is soft. Tenderness: There is no abdominal tenderness. There is no guarding or rebound. Musculoskeletal:      Cervical back: Neck supple. Skin:     General: Skin is warm and dry. Capillary Refill: Capillary refill takes less than 2 seconds. Neurological:      Mental Status: He is alert. DIAGNOSTIC RESULTS   RADIOLOGY:All plain film, CT, MRI, and formal ultrasound images (except ED bedside ultrasound) are read by the radiologist, see reports below, unless otherwisenoted in MDM or here. No orders to display     LABS: All lab results were reviewed by myself, and all abnormals are listed below. Labs Reviewed   COVID-19, RAPID   STREP SCREEN GROUP A THROAT       EMERGENCY DEPARTMENTCOURSE:   Differential diagnosis includes viral syndrome strep pharyngitis postnasal drip. Clinically the patient has a viral syndrome his Covid test was negative his strep screen was negative he clinically appears well I did give him the option of receiving Toradol but he thinks that that is too much he just recommends ibuprofen this was ordered for him suspect postnasal drip causing his sore throat we will give him a prescription for Flonase as he already uses Zyrtec at home. Patient will be discharged he is nontoxic in appearance.          Vitals:    Vitals:    10/18/21 2103   BP: 135/82   Pulse: 70   Resp: 18   Temp: 98.2 °F (36.8 °C)   TempSrc: Oral   SpO2: 99%   Weight: 179 lb (81.2 kg)   Height: 5' 7\" (1.702 m)       The patient was given the following medications while in the emergency department:  Orders Placed This Encounter   Medications    ibuprofen (ADVIL;MOTRIN) tablet 600 mg    fluticasone (FLONASE) 50 MCG/ACT nasal spray     Si spray by Each Nostril route daily     Dispense:  16 g     Refill:  0    ibuprofen (ADVIL;MOTRIN) 600 MG tablet     Sig: Take 1 tablet by mouth every 6 hours as needed for Pain     Dispense:  20 tablet     Refill:  0         FINAL IMPRESSION      1. Acute pharyngitis, unspecified etiology    2.  Nasal congestion         DISPOSITION/PLAN   DISPOSITION Decision To Discharge 10/19/2021 12:05:52 AM      PATIENT REFERRED TO:  Yunior Sharma MD  [address] [id number] [phone]          DISCHARGE MEDICATIONS:  New Prescriptions    FLUTICASONE (FLONASE) 50 MCG/ACT NASAL SPRAY    1 spray by Each Nostril route daily    IBUPROFEN (ADVIL;MOTRIN) 600 MG TABLET    Take 1 tablet by mouth every 6 hours as needed for Pain     Gill Mendoza MD  Attending Emergency Physician    This charting supersedes any ED resident or staff charting and was written using speech recognition software        Gill Mendoza MD  10/19/21 0006

## 2021-10-19 NOTE — TELEPHONE ENCOUNTER
[facility] ([facility]) ED Follow up Call          FU appts/Provider:    Future Appointments   Date Time Provider Angelina Anais   12/13/2021 11:45 AM [facility]       VOICEMAIL DOCUMENTATION - ERASE IF NOT USED  Hi, this message is for  Javad Blanton  This is Emmie Hadley from [facility] office. Just calling to see how you are doing after your recent visit to the Emergency Room. [facility] wants to make sure you were able to fill any prescriptions and that you understand your discharge instructions. Please return our call if you need to make a follow up appointment with your provider or have any further needs. Our phone number is [phone]. Have a great day.

## 2021-10-26 DIAGNOSIS — I10 ESSENTIAL HYPERTENSION: ICD-10-CM

## 2021-10-26 RX ORDER — LOSARTAN POTASSIUM 50 MG/1
TABLET ORAL
Qty: 90 TABLET | Refills: 1 | Status: SHIPPED | OUTPATIENT
Start: 2021-10-26 | End: 2022-02-15

## 2021-12-09 ENCOUNTER — APPOINTMENT (OUTPATIENT)
Dept: GENERAL RADIOLOGY | Age: 46
End: 2021-12-09
Payer: MEDICARE

## 2021-12-09 ENCOUNTER — HOSPITAL ENCOUNTER (EMERGENCY)
Age: 46
Discharge: HOME | End: 2021-12-10
Attending: STUDENT IN AN ORGANIZED HEALTH CARE EDUCATION/TRAINING PROGRAM
Payer: MEDICARE

## 2021-12-09 DIAGNOSIS — S61.112A LACERATION OF LEFT THUMB WITHOUT FOREIGN BODY WITH DAMAGE TO NAIL, INITIAL ENCOUNTER: Primary | ICD-10-CM

## 2021-12-09 PROCEDURE — 12001 RPR S/N/AX/GEN/TRNK 2.5CM/<: CPT

## 2021-12-09 PROCEDURE — 73140 X-RAY EXAM OF FINGER(S): CPT

## 2021-12-09 PROCEDURE — 99283 EMERGENCY DEPT VISIT LOW MDM: CPT

## 2021-12-09 RX ORDER — LIDOCAINE HYDROCHLORIDE 10 MG/ML
20 INJECTION, SOLUTION INFILTRATION; PERINEURAL ONCE
Status: COMPLETED | OUTPATIENT
Start: 2021-12-09 | End: 2021-12-10

## 2021-12-09 NOTE — Clinical Note
Triage Chief Complaint:   Emesis    [place]:  Maribel Anne is a 80 y.o. female that presents to the ED with complaints of cough for approx 1 week. Was given a zpack around noon yesterday when she reports having nausea and  non-bloody nonbilious emesis  +Generalized weakness and confusion per son. Decreased by mouth intake. Patient is Altered and oriented x 3 at bedside.      ROS:  General:  No fevers, no chills, + weakness  Eyes:  No recent vison changes, no discharge  ENT:  No sore throat, no nasal congestion, no hearing changes  Cardiovascular:  No chest pain, no palpitations  Respiratory:  No shortness of breath, + cough, no wheezing  Gastrointestinal:  No pain, +nausea, +vomiting, no diarrhea  Musculoskeletal:  No muscle pain, no joint pain  Skin:  No rash, no pruritis, no easy bruising  Neurologic:  No speech problems, no headache, no extremity numbness, no extremity tingling, no extremity weakness  Psychiatric:  No anxiety  Genitourinary:  No dysuria, no hematuria  Endocrine:  No unexpected weight gain, no unexpected weight loss  Extremities:  no edema, no pain    Past Medical History:   Diagnosis Date    Arthritis of shoulder region, right 9/2014    Baskin Selena Blind right eye     following right cataract surg    Chronic depression 2009    Dr. Vonna Merlin    DVT (deep venous thrombosis) ([facility] [address] 1970    left leg    Former smoker     H/O Doppler carotid ultrasound 05/01/2019    Mild 0-49% disease of the bilateral proximal internal carotid artery, mobile, echogenic plaque in right sublavian artery that is unchanged from previous testing 50-99% stenosis of right subclavian artery, normal vertebral flow bilaterally    Hx of Doppler ultrasound 03/01/2018    Carotid-mild 0-49% bilateral carotid,50% stenosis right subclavian    Hyperlipidemia     Hypertension     Macular degeneration     right    Mild cognitive impairment 2/2012    MMSE 26/30    MVP (mitral valve prolapse)     trace on echo 2014    Osteoporosis 5/2012 Yemi Emanuel was seen and treated in our emergency department on 12/9/2021. He may return to work on 12/11/2021. If you have any questions or concerns, please don't hesitate to call.       Dominic Velásquez, DO  Pancytopenia 2011    mild with ; Dr Geo hawk     Peptic ulcer disease     Peripheral vascular disease ([address] 2016    angio; dis below ankles; pletal    Prediabetes     Recurrent ventral incisional hernia     medial apex of GB scar    S/P CABG x 3     3-vessel; Dr Ivan Slipper Spigelian hernia 2017    right ant lateral wall; seen on CT abd    Supraventricular tachycardia ([address]     Freq ventriular ectopy    Tic douloureux     Dr. Zac Rhodes; Right side     Past Surgical History:   Procedure Laterality Date    CATARACT REMOVAL Right     poor result ; blind right eye; [address] CORONARY ARTERY BYPASS GRAFT  2009    3 vessel    SHOULDER ARTHROSCOPY Right     TUBAL LIGATION      URETER SURGERY      Right ureteral repair     Family History   Problem Relation Age of Onset    Cancer Sister 79        Breast cancer 2017     Social History     Socioeconomic History    Marital status:       Spouse name: Graham Espinal Number of children: 2    Years of education: 15    Highest education level: Not on file   Occupational History    Occupation: retired   Social Needs    Financial resource strain: Not on file    Food insecurity:     Worry: Not on file     Inability: Not on file   LOYAL3 needs:     Medical: Not on file     Non-medical: Not on file   Tobacco Use    Smoking status: Former Smoker     Packs/day: 0.25     Years: 9.00     Pack years: 2.25     Types: Cigarettes     Start date:      Last attempt to quit: 1985     Years since quittin.4    Smokeless tobacco: Never Used    Tobacco comment: 2016   Substance and Sexual Activity    Alcohol use: No     Alcohol/week: 0.0 oz    Drug use: No    Sexual activity: Not on file   Lifestyle    Physical activity:     Days per week: Not on file     Minutes per session: Not on file    Stress: Not on file   Relationships    Social connections:     Talks on phone: Not on file     Gets together: Not on file     Attends [employer] service: Not on file     Active member of club or organization: Not on file     Attends meetings of clubs or organizations: Not on file     Relationship status: Not on file    Intimate partner violence:     Fear of current or ex partner: Not on file     Emotionally abused: Not on file     Physically abused: Not on file     Forced sexual activity: Not on file   Other Topics Concern    Not on file   Social History Narrative    Not on file     No current facility-administered medications for this encounter. Current Outpatient Medications   Medication Sig Dispense Refill    azithromycin (ZITHROMAX) 250 MG tablet Take 250 mg by mouth daily  0    albuterol sulfate  (90 Base) MCG/ACT inhaler Inhale 2 puffs into the lungs 3 times daily as needed  0    pantoprazole (PROTONIX) 40 MG tablet take 1 tablet by mouth once daily 90 tablet 1    amLODIPine (NORVASC) 5 MG tablet take 1 tablet by mouth once daily 90 tablet 1    niacin 500 MG extended release capsule Take 500 mg by mouth nightly      rosuvastatin (CRESTOR) 10 MG tablet take 1 tablet by mouth once daily 90 tablet 1    gabapentin (NEURONTIN) 300 MG capsule take 1 capsule by mouth three times a day 270 capsule 1    fluticasone (FLONASE) 50 MCG/ACT nasal spray instill 1 spray into each nostril once daily 48 g 1    cyanocobalamin 1000 MCG tablet Take 1,000 mcg by mouth daily      divalproex (DEPAKOTE) 250 MG DR tablet Take 500 mg by mouth Daily with supper       Multiple Vitamins-Minerals (OCUVITE-LUTEIN PO) Take by mouth      epinastine (ELESTAT) 0.05 % SOLN Place 1 drop into both eyes daily       aspirin 81 MG EC tablet Take 81 mg by mouth daily.         denosumab (PROLIA) 60 MG/ML SOLN SC injection Inject 1 mL into the skin once for 1 dose 1 mL 0    Cholecalciferol (VITAMIN D3) 26612 UNITS CAPS Take by mouth      calcium-vitamin D (OSCAL 500/200 D-3) 500-200 MG-UNIT per tablet Take 1 tablet by mouth 2 times daily. Allergies   Allergen Reactions    Alendronate Sodium Other (See Comments)     GI upset    Boniva [Ibandronate Sodium] Other (See Comments)     Gi upset and declined egd; also to fosamax    Lisinopril Other (See Comments)     Severe hyperkalemia (6) with bun >56      Neggram [Nalidixic Acid]     Pce [Erythromycin]     Penicillins     Welchol [Colesevelam Hcl] Other (See Comments)     constipation    Carbamazepine Nausea And Vomiting    Lovaza [Omega-3-Acid Ethyl Esters] Nausea And Vomiting       Nursing Notes Reviewed    Physical Exam:  ED Triage Vitals [05/16/19 1713]   Enc Vitals Group      BP (!) 186/71      Pulse 94      Resp 18      Temp 98.3 °F (36.8 °C)      Temp src       SpO2 97 %      Weight 133 lb (60.3 kg)      Height 5' 3\" (1.6 m)      Head Circumference       Peak Flow       Pain Score       Pain Loc       Pain Edu? Excl. in [address]? My pulse ox interpretation is - normal    General appearance:  No acute distress. Skin:  Warm. Dry. Eye:  Extraocular movements intact. Ears, nose, mouth and throat:  Oral mucosa moist   Neck:  Trachea midline. Extremity:  No swelling. Normal ROM     Heart:  Regular rate and rhythm, normal S1 & S2, no extra heart sounds. Perfusion:  intact  Respiratory:  Lungs clear to auscultation bilaterally. Respirations nonlabored. Abdominal:  Normal bowel sounds. Soft. Generalize abdominal tenderness. no rebound  or guarding. Non distended. Back:  No CVA tenderness to palpation     Neurological:  Alert and oriented times 3. No focal neuro deficits.              Psychiatric:  Appropriate    I have reviewed and interpreted all of the currently available lab results from this visit (if applicable):  Results for orders placed or performed during the hospital encounter of 05/16/19   CBC Auto Differential   Result Value Ref Range    WBC 4.3 4.0 - 10.5 K/CU MM    RBC 3.70 (L) 4.2 - 5.4 M/CU MM    Hemoglobin Bacteria, UA NEGATIVE NEGATIVE /HPF    Squam Epithel, UA <1 /HPF    Trichomonas, UA NONE SEEN NONE SEEN /HPF   Troponin   Result Value Ref Range    Troponin T <0.010 <0.01 NG/ML   Troponin   Result Value Ref Range    Troponin T <0.010 <0.01 NG/ML   EKG 12 Lead   Result Value Ref Range    Ventricular Rate 94 BPM    Atrial Rate 94 BPM    P-R Interval 170 ms    QRS Duration 98 ms    Q-T Interval 354 ms    QTc Calculation (Bazett) 442 ms    P Axis 40 degrees    R Axis 5 degrees    T Axis -7 degrees    Diagnosis       Sinus rhythm with frequent premature ventricular complexes  Nonspecific ST and T wave abnormality  Abnormal ECG  No previous ECGs available  Confirmed by [facility] SHERRILL MCDUFFIE ([id number]) on 5/17/2019 11:50:54 AM     EKG 12 Lead   Result Value Ref Range    Ventricular Rate 92 BPM    Atrial Rate 92 BPM    P-R Interval 170 ms    QRS Duration 96 ms    Q-T Interval 356 ms    QTc Calculation (Bazett) 440 ms    P Axis 59 degrees    R Axis 3 degrees    T Axis -19 degrees    Diagnosis       Sinus rhythm with frequent premature ventricular complexes  ST & T wave abnormality, consider inferior ischemia  Abnormal ECG  When compared with ECG of 16-MAY-2019 18:13,  No significant change was found  Confirmed by [facility] MD, [facility] ([id number]) on 5/17/2019 12:12:52 PM        Radiographs (if obtained):  [] The following radiograph was interpreted by myself in the absence of a radiologist:   [x] Radiologist's Report Reviewed:  XR CHEST PORTABLE   Final Result   No acute cardiopulmonary disease. A 1 cm right upper lobe pulmonary nodule is suspected. Superimposed shadows   not entirely excluded. When the patient is able, a follow-up PA and lateral   examination of the chest is recommended. CT ABDOMEN PELVIS W IV CONTRAST Additional Contrast? None   Final Result   No definite acute finding in the abdomen/pelvis to account for right lower   quadrant pain.       There is unchanged thinning of the musculature in the right anterolateral   abdominal wall with bulging of the flank. Colon diverticulosis particularly in the left colon. No evidence of   diverticulitis. Ectasias of the infrarenal abdominal aorta measuring up to 2.1 cm. RECOMMENDATIONS:   2.1 cm mild aortic dilitation, not meeting criteria for aneurysm. No   follow-up imaging is recommended. Reference: J Vasc Surg 2009 Oct;50(4 Suppl):S2-49. CT Head WO Contrast   Final Result   No acute intracranial abnormality. EKG (if obtained):   12 lead EKG per my interpretation:  Normal Sinus Rhythm 94  Axis is   Normal  QTc is  442  There is no specific T wave changes appreciated. There is no specific ST wave changes appreciated. Occasional PVC    Prior EKG to compare with was available 5/16/2019    12 lead EKG per my interpretation:  Normal Sinus Rhythm 92  Axis is   normal  QTc is  normal  There is no specific T wave changes appreciated. There is no specific ST wave changes appreciated. Prior EKG to compare with was available earlier        Chart review shows recent radiographs:  Vl Dup Carotid Bilateral    Result Date: 5/1/2019  Carotid Duplex Study Demographics   Patient Name        JUAN Cofield   Date of study          05/01/2019   Date of Birth       1937  Gender                 Female   Age                 80          Race                      Patient Number      [id number]    Room Number   Visit Number        [id number]   Height   Corporate ID        [id number]    Weight   Accession Number    [id number]   Ordering Physician              Sonographer            BARB DuncanT                                   Interpreting Physician Ti Reyes MD  Procedure Type of Study:   Cerebral:Carotid, VL CAROTID BILATERAL. Indications for Study:Carotid disease. Conclusions   Summary   Mild (0-49%) disease of the Bilateral proximal Internal carotid artery.   Mobile, unremarkable for any acute findings. She was given Reglan Benadryl and IV fluid resuscitation. Her chest x-ray reported to have a centimeter right upper lobe pulmonary nodule. I discussed this finding with the family at the bedside and instructed them to follow up with a repeat imaging. CAT scan unremarkable for any acute findings. The patient was reassessed and tolerated food and drink. She was ambulatory without any issues. Given low suspicion for ACS Delta troponin was ordered and repeat EKG. I discussed all the labs with the family she felt comfortable going home and following up with her doctor. She was instructed to abstain from taking a z pack. She was given supportive care instructions. Return precautions were given. Clinical Impression:  1. Non-intractable vomiting with nausea, unspecified vomiting type      Disposition referral (if applicable):  Giulia Lucero MD  [address]  [id number]  [phone]    Schedule an appointment as soon as possible for a visit   As needed    Disposition medications (if applicable):  Discharge Medication List as of 5/16/2019 10:57 PM          Comment: Please note this report has been produced using speech recognition software and may contain errors related to that system including errors in grammar, punctuation, and spelling, as well as words and phrases that may be inappropriate. If there are any questions or concerns please feel free to contact the dictating provider for clarification.         Iban Morfin MD  05/18/19 1301

## 2021-12-09 NOTE — Clinical Note
Jose Serna was seen and treated in our emergency department on 12/9/2021. He may return to work on 12/11/2021. If you have any questions or concerns, please don't hesitate to call.       Sahara Mai, DO

## 2021-12-10 ENCOUNTER — TELEPHONE (OUTPATIENT)
Dept: FAMILY MEDICINE CLINIC | Age: 46
End: 2021-12-10

## 2021-12-10 VITALS
DIASTOLIC BLOOD PRESSURE: 76 MMHG | RESPIRATION RATE: 16 BRPM | OXYGEN SATURATION: 98 % | HEIGHT: 67 IN | TEMPERATURE: 98.2 F | WEIGHT: 170 LBS | SYSTOLIC BLOOD PRESSURE: 130 MMHG | HEART RATE: 78 BPM | BODY MASS INDEX: 26.68 KG/M2

## 2021-12-10 PROCEDURE — 6370000000 HC RX 637 (ALT 250 FOR IP): Performed by: STUDENT IN AN ORGANIZED HEALTH CARE EDUCATION/TRAINING PROGRAM

## 2021-12-10 PROCEDURE — 2500000003 HC RX 250 WO HCPCS: Performed by: STUDENT IN AN ORGANIZED HEALTH CARE EDUCATION/TRAINING PROGRAM

## 2021-12-10 RX ORDER — HYDROCODONE BITARTRATE AND ACETAMINOPHEN 5; 325 MG/1; MG/1
1 TABLET ORAL EVERY 6 HOURS PRN
Qty: 3 TABLET | Refills: 0 | Status: SHIPPED | OUTPATIENT
Start: 2021-12-10 | End: 2021-12-11

## 2021-12-10 RX ORDER — CEPHALEXIN 500 MG/1
500 CAPSULE ORAL 4 TIMES DAILY
Qty: 28 CAPSULE | Refills: 0 | Status: SHIPPED | OUTPATIENT
Start: 2021-12-10 | End: 2021-12-17

## 2021-12-10 RX ORDER — CEPHALEXIN 250 MG/1
500 CAPSULE ORAL ONCE
Status: COMPLETED | OUTPATIENT
Start: 2021-12-10 | End: 2021-12-10

## 2021-12-10 RX ADMIN — LIDOCAINE HYDROCHLORIDE 20 ML: 10 INJECTION, SOLUTION INFILTRATION; PERINEURAL at 00:15

## 2021-12-10 RX ADMIN — CEPHALEXIN 500 MG: 250 CAPSULE ORAL at 00:47

## 2021-12-10 ASSESSMENT — ENCOUNTER SYMPTOMS
ABDOMINAL PAIN: 0
VOMITING: 0
SHORTNESS OF BREATH: 0
NAUSEA: 0
CONSTIPATION: 0
EYE PAIN: 0
EYE DISCHARGE: 0
DIARRHEA: 0
RHINORRHEA: 0
COUGH: 0

## 2021-12-10 ASSESSMENT — PAIN SCALES - GENERAL: PAINLEVEL_OUTOF10: 1

## 2021-12-10 NOTE — TELEPHONE ENCOUNTER
[facility] ([facility]) ED Follow up Call            FU appts/Provider:    Future Appointments   Date Time Provider Angelina Anais   12/13/2021 11:45 AM [facility]       VOICEMAIL DOCUMENTATION - ERASE IF NOT USED  Hi, this message is for  Girish Lawrence  This is Daryn Martinez from [employer] office. Just calling to see how you are doing after your recent visit to the Emergency Room. [employer] wants to make sure you were able to fill any prescriptions and that you understand your discharge instructions. Please return our call if you need to make a follow up appointment with your provider or have any further needs. Our phone number is [phone]. Have a great day.

## 2021-12-10 NOTE — ED PROVIDER NOTES
EMERGENCY DEPARTMENT ENCOUNTER   ATTENDING ATTESTATION     Pt Name: Jairo Mejia  MRN: [id number]  [place] 1975  Date of evaluation: 12/9/21       Jairo Mejia is a 55 y.o. male who presents with Laceration      MDM:   55year old male right hand dominant presented for evaluation of left thumb injury. Patient was cutting drywall 2 hours prior to arrival and the knife slipped and cut his left thumb. Laceration through the left nailbed. Tetanus is up to date. Will x-ray and do local wound care, repair. Vitals:   Vitals:    12/09/21 2017   BP: 130/76   Pulse: 78   Resp: 16   Temp: 98.2 °F (36.8 °C)   TempSrc: Oral   SpO2: 98%   Weight: 170 lb (77.1 kg)   Height: 5' 7\" (1.702 m)         I personally evaluated and examined the patient in conjunction with the resident and agree with the assessment, treatment plan, and disposition of the patient as recorded by the resident. I performed a history and physical examination of the patient and discussed management with the resident. I reviewed the residents note and agree with the documented findings and plan of care. Any areas of disagreement are noted on the chart. I was personally present for the key portions of any procedures. I have documented in the chart those procedures where I was not present during the key portions. I have personally reviewed all images and agree with the resident's interpretation. I have reviewed the emergency nurses triage note. I agree with the chief complaint, past medical history, past surgical history, allergies, medications, social and family history as documented unless otherwise noted. The care is provided during an unprecedented national emergency due to the novel coronavirus, COVID 19.   Matthew Rich MD  Attending Emergency Physician            Matthew Rich MD  12/10/21 6182

## 2021-12-10 NOTE — ED PROVIDER NOTES
[facility] ED  Emergency Department Encounter  Emergency Medicine Resident     Pt Name: Leopoldo Levin  MRN: [id number]  [place] 1975  Date of evaluation: 12/9/21  PCP:  Ronald Sorto MD    [address]       Chief Complaint   Patient presents with    Laceration       HISTORY [place]  (Location/Symptom, Timing/Onset, Context/Setting, Quality, Duration, Modifying Factors,Severity.)      Leopoldo Levin is a 55 y.o. male  With acute onset of laceration to the arm while placing drywall. Patient was cut by a blade while trying to cut drywall. Patient states that that was his only injury. Denies any head trauma or any other trauma. Patient is on a blood thinner. Patient's pain is localized to the left thumb with no radiation. Patient states that he lost quite a bit of blood on scene although it was wrapped and he came straight to the emergency department. Patient is up-to-date on tetanus. Takes no home medications, has no allergies to antibiotics that he knows of. States that the pain is severe in nature and sharp in nature. PAST MEDICAL / SURGICAL / SOCIAL / FAMILY HISTORY      has a past medical history of Allergic rhinitis, Anxiety, Cervical spine pain, Gastric reflux, Headache(784.0), Hepatitis, Hyperlipidemia, Hypertension, IBS (irritable bowel syndrome), Insomnia, Iron deficiency anemia, Mild single current episode of major depressive disorder ([address] Pancreatitis, and Seasonal allergies. has a past surgical history that includes Ankle surgery (02/23/14); Hand surgery (08/02/13); and Urethra surgery (1985). Social:  reports that he quit smoking about 3 years ago. His smoking use included cigars. He has never used smokeless tobacco. He reports current alcohol use. He reports that he does not use drugs.     Family Hx:   Family History   Problem Relation Age of Onset    Hypertension Mother     Diabetes Mother         Allergies:  Patient has no known allergies. Home Medications:  Prior to Admission medications    Medication Sig Start Date End Date Taking? Authorizing Provider   cephALEXin (KEFLEX) 500 MG capsule Take 1 capsule by mouth 4 times daily for 7 days 12/10/21 12/17/21 Yes Abbe Son DO   HYDROcodone-acetaminophen (NORCO) 5-325 MG per tablet Take 1 tablet by mouth every 6 hours as needed for Pain for up to 3 doses. Intended supply: 3 days. Take lowest dose possible to manage pain 12/10/21 12/11/21 Yes Abbe Son DO   Multiple Vitamin (MULTIVITAMIN ADULT PO) Take by mouth daily   Yes Historical Provider, MD   losartan (COZAAR) 50 MG tablet TAKE 1 & 1/2 TABLETS BY MOUTH DAILY 10/26/21  Yes Trudi Blanton MD   fluticasone (FLONASE) 50 MCG/ACT nasal spray 1 spray by Each Nostril route daily 10/19/21  Yes Rochelle Johnson MD   cetirizine (ZYRTEC) 10 MG tablet TAKE 1 TABLET BY MOUTH DAILY 9/13/21  Yes CHAIM Landry CNP   vitamin D3 (CHOLECALCIFEROL) 25 MCG (1000 UT) TABS tablet Take 1 tablet by mouth daily 9/13/21  Yes CHAIM Landry CNP   citalopram (CELEXA) 10 MG tablet TAKE 1 TABLET BY MOUTH DAILY 8/31/21  Yes Trudi Blanton MD   ibuprofen (ADVIL;MOTRIN) 600 MG tablet Take 1 tablet by mouth every 6 hours as needed for Pain 10/19/21   Rochelle Johnson MD   baclofen (LIORESAL) 10 MG tablet Take 1 tablet by mouth 2 times daily as needed (pain)  Patient not taking: Reported on 9/13/2021 8/3/21   Trudi Blanton MD   clotrimazole-betamethasone (LOTRISONE) 1-0.05 % cream Apply topically 2 times daily. 10/12/20   Trudi Blanton MD   EPINEPHrine (EPIPEN) 0.3 MG/0.3ML SOAJ injection Inject 1 mL into the muscle as needed (Anaphylaxis) Use as directed for allergic reaction 6/13/20   Nazario Campbell MD   Elastic Bandages & Supports (KNEE BRACE/HINGED/LARGE) MISC Use daily for knee pain 4/28/20   Trudi Blanton MD   Blood Pressure KIT Dx: HTN. Needs automatic blood pressure machine to monitor his blood pressure.  4/28/20 Franky Merlos MD   Ciclopirox (LOPROX) 0.77 % gel Apply in between toes twice daily. 10/14/19   Verline Nageotte, DPM   melatonin 5 MG TABS tablet Take 5 mg by mouth daily  Patient not taking: Reported on 9/13/2021    Historical Provider, MD       REVIEW OFSYSTEMS    (2-9 systems for level 4, 10 or more for level 5)      Review of Systems   Constitutional: Negative for chills and fever. HENT: Negative for congestion, ear pain and rhinorrhea. Eyes: Negative for pain and discharge. Respiratory: Negative for cough and shortness of breath. Cardiovascular: Negative for chest pain and palpitations. Gastrointestinal: Negative for abdominal pain, constipation, diarrhea, nausea and vomiting. Skin: Positive for wound (left thumb laceration ). Negative for rash. Neurological: Negative for light-headedness and headaches. PHYSICAL EXAM   (up to 7 for level 4, 8 or more forlevel 5)      INITIAL VITALS:   Vitals:    12/10/21 0049   BP:    Pulse:    Resp:    Temp:    SpO2: 98%        Physical Exam  Vitals and nursing note reviewed. Constitutional:       General: He is not in acute distress. Appearance: Normal appearance. He is not ill-appearing or toxic-appearing. HENT:      Head: Normocephalic and atraumatic. Nose: Nose normal.      Mouth/Throat:      Mouth: Mucous membranes are moist.      Pharynx: Oropharynx is clear. Eyes:      General:         Right eye: No discharge. Left eye: No discharge. Cardiovascular:      Rate and Rhythm: Normal rate and regular rhythm. Pulses: Normal pulses. Heart sounds: Normal heart sounds. Pulmonary:      Effort: Pulmonary effort is normal. No respiratory distress. Breath sounds: Normal breath sounds. Abdominal:      General: There is no distension. Palpations: Abdomen is soft. Tenderness: There is no abdominal tenderness. There is no guarding or rebound. Musculoskeletal:      Cervical back: Normal range of motion. No rigidity. Comments: Left hand notable for laceration to medial aspect of distal thumb with nailbed involvement, nail involvement, 2 cm laceration with avulsion of nail and nailbed as well as soft tissue at the distal end of the finger. Neurological:      General: No focal deficit present. Mental Status: He is alert and oriented to person, place, and time. DIFFERENTIAL  DIAGNOSIS       DDX: Laceration    Initial MDM/Plan: 55 y.o. male with new onset of left thumb laceration. Upon my initial examination patient is resting comfortably in the cot, in no acute distress, no respiratory distress, speaking full sentences. Vital signs upon arrival are within normal limits including patient being afebrile, nontachycardic, nontachypneic, nontoxic-appearing. Patient has a GCS of 15 is alert, oriented, answering all questions appropriately. Will obtain imaging to rule out fracture. Patient is up-to-date on tetanus. We will plan for closure with suturing, irrigation of wound, local anesthesia will be provided. EMERGENCY DEPARTMENT COURSE:  ED Course as of 12/10/21 2133   Thu Dec 09, 2021   8001 XR FINGER LEFT (MIN 2 VIEWS)  IMPRESSION:  1. Soft tissue laceration involving the tuft of the thumb, without evidence  of osseous involvement [MA]      ED Course User Index  [MA] Markie Brown DO      Provided with Keflex for soft tissue infection coverage. Patient will follow up with primary care provider on Monday for wound check. Patient provided with information to follow-up with hand surgery. Strict return precautions provided. Patient expresses understanding of discharge instructions and is able to repeat strict return precautions back to me. Patient discharged in stable condition after remained vitally stable throughout emergency department stay.       DIAGNOSTIC RESULTS / EMERGENCYDEPARTMENT COURSE / MDM     LABS:  Labs Reviewed - No data to display      RADIOLOGY:  XR FINGER LEFT (MIN 2 VIEWS)    Result Date: 12/9/2021  EXAMINATION: THREE XRAY VIEWS OF THE LEFT FINGERS 12/9/2021 5:31 pm COMPARISON: September 9, 2012 HISTORY: ORDERING SYSTEM PROVIDED HISTORY: left thumb injury, r/o fracture TECHNOLOGIST PROVIDED HISTORY: left thumb injury, r/o fracture Reason for Exam: Left thumb injury, r/o fracture. Pt cut distal portion of left thumb with a utility blade Additional signs and symptoms: Left thumb injury, r/o fracture. Pt cut distal portion of left thumb with a utility blade Relevant Medical/Surgical History: Left thumb injury, r/o fracture. Pt cut distal portion of left thumb with a utility blade FINDINGS: Soft tissue laceration is present involving the tuft of the thumb. No acute osseous abnormality or radiopaque foreign body is present. No traumatic malalignment is present. 1. Soft tissue laceration involving the tuft of the thumb, without evidence of osseous involvement             PROCEDURES:    Laceration Repair Procedure Note    Indication: Laceration    Procedure: The patient was placed in the appropriate position and anesthesia around the laceration was obtained by infiltration using 1% Lidocaine without epinephrine. The area was then irrigated with normal saline. The laceration was closed with 3-0 Ethilon using interrupted sutures and nailbed was closed with fast gut 503 sutures. There were no additional lacerations requiring repair. The wound area was then dressed with bacitracin, a bandage, vaseline soaked gauze and tape. Total repaired wound length: 2 cm. Other Items: Suture count: 3 level and 3 nonabsorbable    The patient tolerated the procedure well. Complications: bleeding        CONSULTS:  None      FINAL IMPRESSION      1.  Laceration of left thumb without foreign body with damage to nail, initial encounter          DISPOSITION / PLAN     DISPOSITION Decision To Discharge 12/10/2021 12:35:40 AM      PATIENT REFERRED TO:  Jody Manning MD  [address] [facility] [address] [facility] [phone]      go to your already scheduled appointment on Monday for wounds check    MD Marybeth Hobson [address], [address] [facility]  [address] [facility] [address] [phone]    Call   to schedule an appointment with Hand Surgery for follow up as needed    [facility] ED  Angelina Ramirez [id number] [phone]    As needed, If symptoms worsen      DISCHARGE MEDICATIONS:  Discharge Medication List as of 12/10/2021 12:39 AM      START taking these medications    Details   cephALEXin (KEFLEX) 500 MG capsule Take 1 capsule by mouth 4 times daily for 7 days, Disp-28 capsule, R-0Print             Ramon Conklni DO  Emergency Medicine Resident    (Please note that portions of this note were completed with a voice recognition program.Efforts were made to edit the dictations but occasionally words are mis-transcribed.)       Ramon Conklin DO  Resident  12/10/21 6025

## 2021-12-10 NOTE — ED NOTES
Mode of arrival (squad #, walk in, police, etc) : Walked into triage        Chief complaint(s): Thumb laceration         Arrival Note (brief scenario, treatment PTA, etc). : States he cut his thumb on a . A/o x 3        C= \"Have you ever felt that you should Cut down on your drinking? \"  No  A= \"Have people Annoyed you by criticizing your drinking? \"  No  G= \"Have you ever felt bad or Guilty about your drinking? \"  No  E= \"Have you ever had a drink as an Eye-opener first thing in the morning to steady your nerves or to help a hangover? \"  No      Deferred []      Reason for deferring: N/A    *If yes to two or more: probable alcohol abuse. Giovanni Vega RN  12/09/21 2023

## 2021-12-13 ENCOUNTER — OFFICE VISIT (OUTPATIENT)
Dept: FAMILY MEDICINE CLINIC | Age: 46
End: 2021-12-13
Payer: MEDICARE

## 2021-12-13 ENCOUNTER — HOSPITAL ENCOUNTER (OUTPATIENT)
Age: 46
Discharge: HOME | End: 2021-12-13
Payer: MEDICARE

## 2021-12-13 VITALS
TEMPERATURE: 97.9 F | SYSTOLIC BLOOD PRESSURE: 120 MMHG | BODY MASS INDEX: 27.31 KG/M2 | OXYGEN SATURATION: 98 % | WEIGHT: 174 LBS | HEART RATE: 72 BPM | DIASTOLIC BLOOD PRESSURE: 80 MMHG | HEIGHT: 67 IN

## 2021-12-13 DIAGNOSIS — Z12.11 COLON CANCER SCREENING: ICD-10-CM

## 2021-12-13 DIAGNOSIS — R73.03 PREDIABETES: ICD-10-CM

## 2021-12-13 DIAGNOSIS — M54.50 CHRONIC MIDLINE LOW BACK PAIN WITHOUT SCIATICA: Primary | ICD-10-CM

## 2021-12-13 DIAGNOSIS — M51.36 LUMBAR DEGENERATIVE DISC DISEASE: ICD-10-CM

## 2021-12-13 DIAGNOSIS — E78.2 MIXED HYPERLIPIDEMIA: ICD-10-CM

## 2021-12-13 DIAGNOSIS — Z23 ENCOUNTER FOR IMMUNIZATION: ICD-10-CM

## 2021-12-13 DIAGNOSIS — S69.92XD INJURY OF LEFT THUMB, SUBSEQUENT ENCOUNTER: ICD-10-CM

## 2021-12-13 DIAGNOSIS — G89.29 CHRONIC MIDLINE LOW BACK PAIN WITHOUT SCIATICA: Primary | ICD-10-CM

## 2021-12-13 DIAGNOSIS — I10 ESSENTIAL HYPERTENSION: ICD-10-CM

## 2021-12-13 DIAGNOSIS — D72.819 LEUKOPENIA, UNSPECIFIED TYPE: ICD-10-CM

## 2021-12-13 PROBLEM — S69.92XA INJURY OF LEFT THUMB: Status: ACTIVE | Noted: 2021-12-13

## 2021-12-13 PROBLEM — M51.369 LUMBAR DEGENERATIVE DISC DISEASE: Status: ACTIVE | Noted: 2021-12-13

## 2021-12-13 LAB
ABSOLUTE EOS #: 0.08 K/UL (ref 0–0.4)
ABSOLUTE IMMATURE GRANULOCYTE: ABNORMAL K/UL (ref 0–0.3)
ABSOLUTE LYMPH #: 1.44 K/UL (ref 1–4.8)
ABSOLUTE MONO #: 0.34 K/UL (ref 0.1–1.3)
ABSOLUTE RETIC #: 0.03 M/UL (ref 0.02–0.1)
ATYPICAL LYMPHOCYTE ABSOLUTE COUNT: 0.57 K/UL
ATYPICAL LYMPHOCYTES: 15 %
BASOPHILS # BLD: 1 % (ref 0–2)
BASOPHILS ABSOLUTE: 0.04 K/UL (ref 0–0.2)
DIFFERENTIAL TYPE: ABNORMAL
EOSINOPHILS RELATIVE PERCENT: 2 % (ref 0–4)
HBA1C MFR BLD: 5.1 %
HCT VFR BLD CALC: 42.8 % (ref 41–53)
HEMOGLOBIN: 14.2 G/DL (ref 13.5–17.5)
IMMATURE GRANULOCYTES: ABNORMAL %
IMMATURE RETIC FRACT: NORMAL %
LYMPHOCYTES # BLD: 38 % (ref 24–44)
MCH RBC QN AUTO: 30 PG (ref 26–34)
MCHC RBC AUTO-ENTMCNC: 33.2 G/DL (ref 31–37)
MCV RBC AUTO: 90.5 FL (ref 80–100)
MONOCYTES # BLD: 9 % (ref 1–7)
MORPHOLOGY: NORMAL
NRBC AUTOMATED: ABNORMAL PER 100 WBC
PDW BLD-RTO: 14.4 % (ref 11.5–14.9)
PLATELET # BLD: 257 K/UL (ref 150–450)
PLATELET ESTIMATE: ABNORMAL
PMV BLD AUTO: 8.4 FL (ref 6–12)
RBC # BLD: 4.73 M/UL (ref 4.5–5.9)
RBC # BLD: ABNORMAL 10*6/UL
RETIC %: 0.7 % (ref 0.5–2)
RETIC HEMOGLOBIN: NORMAL PG (ref 28.2–35.7)
SEG NEUTROPHILS: 35 % (ref 36–66)
SEGMENTED NEUTROPHILS ABSOLUTE COUNT: 1.33 K/UL (ref 1.3–9.1)
WBC # BLD: 3.8 K/UL (ref 3.5–11)
WBC # BLD: ABNORMAL 10*3/UL

## 2021-12-13 PROCEDURE — 85025 COMPLETE CBC W/AUTO DIFF WBC: CPT

## 2021-12-13 PROCEDURE — G8482 FLU IMMUNIZE ORDER/ADMIN: HCPCS | Performed by: FAMILY MEDICINE

## 2021-12-13 PROCEDURE — G8417 CALC BMI ABV UP PARAM F/U: HCPCS | Performed by: FAMILY MEDICINE

## 2021-12-13 PROCEDURE — 1036F TOBACCO NON-USER: CPT | Performed by: FAMILY MEDICINE

## 2021-12-13 PROCEDURE — 90471 IMMUNIZATION ADMIN: CPT | Performed by: FAMILY MEDICINE

## 2021-12-13 PROCEDURE — 90674 CCIIV4 VAC NO PRSV 0.5 ML IM: CPT | Performed by: FAMILY MEDICINE

## 2021-12-13 PROCEDURE — 85045 AUTOMATED RETICULOCYTE COUNT: CPT

## 2021-12-13 PROCEDURE — 83036 HEMOGLOBIN GLYCOSYLATED A1C: CPT | Performed by: FAMILY MEDICINE

## 2021-12-13 PROCEDURE — 99214 OFFICE O/P EST MOD 30 MIN: CPT | Performed by: FAMILY MEDICINE

## 2021-12-13 PROCEDURE — 36415 COLL VENOUS BLD VENIPUNCTURE: CPT

## 2021-12-13 PROCEDURE — G8427 DOCREV CUR MEDS BY ELIG CLIN: HCPCS | Performed by: FAMILY MEDICINE

## 2021-12-13 ASSESSMENT — ENCOUNTER SYMPTOMS
CONSTIPATION: 0
SINUS PAIN: 0
COUGH: 0
BLOOD IN STOOL: 0
SHORTNESS OF BREATH: 0
BACK PAIN: 1
ABDOMINAL PAIN: 0
COLOR CHANGE: 1
SINUS PRESSURE: 0
VOMITING: 0
APNEA: 0
WHEEZING: 0
DIARRHEA: 0
ABDOMINAL DISTENTION: 0
NAUSEA: 0
CHEST TIGHTNESS: 0

## 2021-12-13 NOTE — PROGRESS NOTES
5/31/2018   PHQ2 Score 1 0 1   PHQ9 Score 1 0 1      [x]1-4 = Minimal depression   []5-9 = Milddepression   []10-14 = Moderate depression   []15-19 = Moderately severe depression   []20-27 = Severe depression    Discussed testing with the patient and all questions fully answered. Admission on 10/18/2021, Discharged on 10/19/2021   Component Date Value Ref Range Status    Specimen Description 10/18/2021 . NASOPHARYNGEAL SWAB   Final    SARS-CoV-2, Rapid 10/18/2021 Not Detected  Not Detected Final    Comment:       Rapid NAAT:  The specimen is NEGATIVE for SARS-CoV-2, the novel coronavirus associated with   COVID-19. The ID NOW COVID-19 assay is designed to detect the virus that causes COVID-19 in patients   with signs and symptoms of infection who are suspected of COVID-19. An individual without symptoms of COVID-19 and who is not shedding SARS-CoV-2 virus would   expect to have a negative (not detected) result in this assay. Negative results should be treated as presumptive and, if inconsistent with clinical signs   and symptoms or necessary for patient management,  should be tested with an alternative molecular assay. Negative results do not preclude   SARS-CoV-2 infection and   should not be used as the sole basis for patient management decisions. Fact sheet for Healthcare Providers: Cooper  Fact sheet for Patients: Cooper          Methodology: Isothermal Nucleic Acid Amplification      Specimen Description 10/18/2021 . THROAT   Final    Special Requests 10/18/2021 NOT REPORTED   Final    Direct Exam 10/18/2021 Rapid Strep A negative. A negative Rapid Group A Strep Screen result does not rule out the possibility of Group A Streptococci in the specimen.  A Group A Strep DNA test is available upon request.   Final         Most recent labs reviewed:     Lab Results   Component Value Date    WBC 3.2 (L) 08/03/2021    HGB 14.2 08/03/2021    HCT 41.4 08/03/2021    MCV 91.3 08/03/2021     08/03/2021       @BRIEFLAB(NA,K,CL,CO2,BUN,CREATININE,GLUCOSE,CALCIUM)@     Lab Results   Component Value Date    ALT 21 01/16/2021    AST 19 01/16/2021    ALKPHOS 63 01/16/2021    BILITOT 0.25 (L) 01/16/2021       Lab Results   Component Value Date    TSH 1.78 08/18/2016       Lab Results   Component Value Date    CHOL 174 01/16/2021    CHOL 212 (H) 01/21/2020    CHOL 188 06/18/2018     Lab Results   Component Value Date    TRIG 47 01/16/2021    TRIG 55 01/21/2020    TRIG 75 06/18/2018     Lab Results   Component Value Date    HDL 44 01/16/2021    HDL 63 01/21/2020    HDL 59 06/18/2018     Lab Results   Component Value Date    LDLCHOLESTEROL 121 01/16/2021    LDLCHOLESTEROL 138 (H) 01/21/2020    LDLCHOLESTEROL 114 06/18/2018     Lab Results   Component Value Date    VLDL NOT REPORTED 01/16/2021    VLDL NOT REPORTED 01/21/2020    VLDL NOT REPORTED 06/18/2018     Lab Results   Component Value Date    CHOLHDLRATIO 4.0 01/16/2021    CHOLHDLRATIO 3.4 01/21/2020    CHOLHDLRATIO 3.2 06/18/2018       Lab Results   Component Value Date    LABA1C 5.1 12/13/2021       No results found for: [id number]    No results found for: FOLATE    No results found for: IRON, TIBC, FERRITIN    Lab Results   Component Value Date    VITD25 23.8 (L) 11/26/2018             Current Outpatient Medications   Medication Sig Dispense Refill    cephALEXin (KEFLEX) 500 MG capsule Take 1 capsule by mouth 4 times daily for 7 days 28 capsule 0    Multiple Vitamin (MULTIVITAMIN ADULT PO) Take by mouth daily      losartan (COZAAR) 50 MG tablet TAKE 1 & 1/2 TABLETS BY MOUTH DAILY 90 tablet 1    fluticasone (FLONASE) 50 MCG/ACT nasal spray 1 spray by Each Nostril route daily 16 g 0    ibuprofen (ADVIL;MOTRIN) 600 MG tablet Take 1 tablet by mouth every 6 hours as needed for Pain 20 tablet 0    cetirizine (ZYRTEC) 10 MG tablet TAKE 1 TABLET BY MOUTH DAILY 30 tablet 3    vitamin D3 true   Transportation Needs: No Transportation Needs    Lack of Transportation (Medical): No    Lack of Transportation (Non-Medical): No   Physical Activity:     Days of Exercise per Week: Not on file    Minutes of Exercise per Session: Not on file   Stress:     Feeling of Stress : Not on file   Social Connections:     Frequency of Communication with Friends and Family: Not on file    Frequency of Social Gatherings with Friends and Family: Not on file    Attends Protestant Services: Not on file    Active Member of [address] [place] [address] [employer] or Organizations: Not on file    Attends Club or Organization Meetings: Not on file    Marital Status: Not on file   Intimate Partner Violence:     Fear of Current or Ex-Partner: Not on file    Emotionally Abused: Not on file    Physically Abused: Not on file    Sexually Abused: Not on file   Housing Stability: Unknown    Unable to Pay for Housing in the Last Year: No    Number of [place] in the Last Year: Not on file    Unstable Housing in the Last Year: No     Counseling given: Not Answered  Comment: quit 2016        Family History   Problem Relation Age of Onset    Hypertension Mother     Diabetes Mother              -rest of complaints with corresponding details per ROS    The patient's past medical, surgical, social, and family history as well as his current medications and allergies were reviewed as documented intoday's encounter. Review of Systems   Constitutional: Positive for activity change. Negative for appetite change, diaphoresis, fatigue and unexpected weight change. HENT: Negative for congestion, nosebleeds, postnasal drip, sinus pressure, sinus pain and tinnitus. Eyes: Negative for visual disturbance. Respiratory: Negative for apnea, cough, chest tightness, shortness of breath and wheezing. Cardiovascular: Negative for chest pain, palpitations and leg swelling.    Gastrointestinal: Negative for abdominal distention, abdominal pain, blood in stool, constipation, diarrhea, nausea and vomiting. Endocrine: Negative for polyuria. Genitourinary: Negative for difficulty urinating, flank pain, frequency and urgency. Musculoskeletal: Positive for arthralgias, back pain, gait problem, myalgias, neck pain and neck stiffness. Negative for joint swelling. Skin: Positive for color change and wound. Neurological: Positive for numbness. Negative for dizziness, speech difficulty, weakness and headaches. Psychiatric/Behavioral: Negative for agitation, decreased concentration, dysphoric mood, hallucinations and sleep disturbance. The patient is nervous/anxious. Physical Exam  Vitals and nursing note reviewed. Constitutional:       Appearance: Normal appearance. HENT:      Nose: Nose normal. No congestion. Mouth/Throat:      Mouth: Mucous membranes are moist.   Eyes:      Extraocular Movements: Extraocular movements intact. Pupils: Pupils are equal, round, and reactive to light. Cardiovascular:      Rate and Rhythm: Normal rate and regular rhythm. Heart sounds: Normal heart sounds. Pulmonary:      Effort: Pulmonary effort is normal. No respiratory distress. Breath sounds: No rhonchi. Abdominal:      General: There is no distension. Palpations: There is no mass. Tenderness: There is no abdominal tenderness. Hernia: No hernia is present. Musculoskeletal:         General: No swelling. Cervical back: Normal range of motion. Thoracic back: Spasms present. Lumbar back: Deformity, spasms and tenderness present. Decreased range of motion. Negative right straight leg raise test and negative left straight leg raise test. No scoliosis. Right lower leg: No edema. Left lower leg: No edema. Lymphadenopathy:      Cervical: No cervical adenopathy. Skin:     Coloration: Skin is not jaundiced or pale. Findings: Signs of injury present. No abscess or erythema.  Rash is not crusting or nodular. Neurological:      General: No focal deficit present. Mental Status: He is alert and oriented to person, place, and time. Cranial Nerves: No cranial nerve deficit. Sensory: No sensory deficit. Motor: No weakness. Gait: Gait normal.   Psychiatric:         Mood and Affect: Mood is anxious. Mood is not depressed. Affect is not tearful. Behavior: Behavior normal. Behavior is not slowed. ASSESSMENT AND PLAN      1. Chronic midline low back pain without sciatica  Discussed with patient we need further evaluation if pain is not improving will need physical capacity test to fill out FMLA papers and also MRI back. - [address]    2. Lumbar degenerative disc disease  MRI lumbar spine, done physical therapy. - MRI LUMBAR SPINE WO CONTRAST; Future  - [address]    3. Prediabetes  A1c is stable continue diet control  - POCT glycosylated hemoglobin (Hb A1C)    4. Injury of left thumb, subsequent encounter  Continue topical as well as systemic antibiotic continue dressing as needed    5. Essential hypertension  Controlled continue same medications    6. Mixed hyperlipidemia  Stable continue diet control continue weight reduction    7. Encounter for immunization    - INFLUENZA, MDCK QUADV, 2 YRS AND OLDER, IM, PF, PREFILL SYR OR SDV, 0.5ML (FLUCELVAX QUADV, PF)    8. Colon cancer screening    - Cologuard; Future      Orders Placed This Encounter   Procedures    Cologuard     This test is performed by an external laboratory and is used for result attachment only. It is required that this order requisition be faxed to: [facility] @@ [phone]. See [email or web address] for further information.      Standing Status:   Future     Standing Expiration Date:   12/13/2022    MRI LUMBAR SPINE WO CONTRAST     Standing Status:   Future     Standing Expiration Date:   12/13/2022     Order Specific Question:   Reason for exam: Answer:   pain    INFLUENZA, MDCK QUADV, 2 YRS AND OLDER, IM, PF, PREFILL SYR OR SDV, 0.5ML (FLUCELVAX QUADV, PF)    [facility] Physical [facility]     Referral Priority:   Routine     Referral Type:   Eval and Treat     Referral Reason:   Specialty Services Required     Requested Specialty:   Physical Therapy     Number of Visits Requested:   1    POCT glycosylated hemoglobin (Hb A1C)         There are no discontinued medications. Felipa Centeno received counseling on the following healthy behaviors: nutrition, exercise, medication adherence and tobacco cessation  Reviewed prior labs and health maintenance  Continue current medications, diet and exercise. Discussed use, benefit, and side effects of prescribed medications. Barriers to medication compliance addressed. Patient given educational materials - see patient instructions  Was a self-tracking handout given in paper form or via [employer]? Yes    Requested Prescriptions      No prescriptions requested or ordered in this encounter       All patient questions answered. Patient voiced understanding. Quality Measures    Body mass index is 27.25 kg/m². Elevated. Weight control planned discussed daily exercise regimen and Healthy diet and regular exercise. BP: 120/80 Blood pressure is normal. Treatment plan consists of DASH Eating Plan, Dietary Sodium Restriction, Increased Physical Activity and No treatment change needed.     Lab Results   Component Value Date    LDLCHOLESTEROL 121 01/16/2021    (goal LDL reduction with dx if diabetes is 50% LDL reduction)      PHQ Scores 9/13/2021 2/4/2021 5/31/2018   PHQ2 Score 1 0 1   PHQ9 Score 1 0 1     Interpretation of Total Score Depression Severity: 1-4 = Minimal depression, 5-9 = Mild depression, 10-14 = Moderate depression, 15-19 = Moderately severe depression, 20-27 = Severe depression    The patient'spast medical, surgical, social, and family history as well as his   current medications and allergies were reviewed as documented in today's encounter. Medications, labs, diagnostic studies, consultations andfollow-up as documented in this encounter. Return in about 3 months (around 3/13/2022). Patient wasseen with total face to face time of 30 minutes. More than 50% of this visit was counseling and education. Future Appointments   Date Time Provider Angelina Manzo   3/14/2022  3:00 PM Brooklyn Lerner MD [facility]     This note was completed by using the assistance of a speech-recognition program. However, inadvertent computerized transcription errors may be present. Althoughevery effort was made to ensure accuracy, no guarantees can be provided that every mistake has been identified and corrected by editing.   Electronically signed by Brooklyn Lerner MD on 12/13/2021  1:02 PM

## 2021-12-13 NOTE — PROGRESS NOTES
Vaccine Information Sheet, \"Influenza - Inactivated\"  given to Amparo Guy, or parent/legal guardian of  Amparo Guy and verbalized understanding. Patient responses:    Have you ever had a reaction to a flu vaccine? No  Do you have any current illness? No  Have you ever had Guillian Elliott Syndrome? No  Do you have a serious allergy to any of the following: Neomycin, Polymyxin, Thimerosal, eggs or egg products? No    Flu vaccine given per order. Please see immunization tab. Risks and benefits explained. Current VIS given. Visit Information    Have you changed or started any medications since your last visit including any over-the-counter medicines, vitamins, or herbal medicines? no   Are you having any side effects from any of your medications? -  no  Have you stopped taking any of your medications? Is so, why? -  no    Have you seen any other physician or provider since your last visit? No  Have you had any other diagnostic tests since your last visit? No  Have you been seen in the emergency room and/or had an admission to a hospital since we last saw you? No  Have you had your routine dental cleaning in the past 6 months? no    Have you activated your [employer] account? If not, what are your barriers?  Yes     Patient Care Team:  Heath Gray MD as PCP - General (Family Medicine)  Heath Gray MD as PCP - [address] [place] [address] [facility]    Medical History Review  Past Medical, Family, and Social History reviewed and does contribute to the patient presenting condition    Health Maintenance   Topic Date Due    Colon cancer screen colonoscopy  09/01/2019    Flu vaccine (1) 09/01/2021    COVID-19 Vaccine (3 - Booster for [place] series) 11/14/2021    Potassium monitoring  01/16/2022    Creatinine monitoring  01/16/2022    A1C test (Diabetic or Prediabetic)  08/03/2022    Lipid screen  01/16/2026    DTaP/Tdap/Td vaccine (2 - Td or Tdap) 05/31/2028    Hepatitis C screen  Completed    HIV screen Completed    Hepatitis A vaccine  Aged Out    Hepatitis B vaccine  Aged Out    Hib vaccine  Aged Out    Meningococcal (ACWY) vaccine  Aged Out    Pneumococcal 0-64 years Vaccine  Aged Out

## 2021-12-14 LAB
PATHOLOGIST REVIEW: NORMAL
SURGICAL PATHOLOGY REPORT: NORMAL

## 2021-12-15 DIAGNOSIS — M51.36 LUMBAR DEGENERATIVE DISC DISEASE: ICD-10-CM

## 2021-12-15 DIAGNOSIS — M54.2 CERVICAL SPINE PAIN: ICD-10-CM

## 2021-12-15 DIAGNOSIS — M54.50 CHRONIC MIDLINE LOW BACK PAIN WITHOUT SCIATICA: Primary | ICD-10-CM

## 2021-12-15 DIAGNOSIS — G89.29 CHRONIC MIDLINE LOW BACK PAIN WITHOUT SCIATICA: Primary | ICD-10-CM

## 2021-12-22 ENCOUNTER — HOSPITAL ENCOUNTER (OUTPATIENT)
Dept: MRI IMAGING | Age: 46
Discharge: HOME | End: 2021-12-24
Payer: MEDICARE

## 2021-12-22 DIAGNOSIS — M51.36 LUMBAR DEGENERATIVE DISC DISEASE: ICD-10-CM

## 2021-12-22 PROCEDURE — 72148 MRI LUMBAR SPINE W/O DYE: CPT

## 2021-12-23 DIAGNOSIS — M54.2 CERVICAL SPINE PAIN: ICD-10-CM

## 2021-12-23 DIAGNOSIS — G89.29 CHRONIC MIDLINE LOW BACK PAIN WITHOUT SCIATICA: Primary | ICD-10-CM

## 2021-12-23 DIAGNOSIS — M54.50 CHRONIC MIDLINE LOW BACK PAIN WITHOUT SCIATICA: Primary | ICD-10-CM

## 2021-12-27 DIAGNOSIS — M51.26 LUMBAR DISC HERNIATION: ICD-10-CM

## 2021-12-27 DIAGNOSIS — M54.50 CHRONIC MIDLINE LOW BACK PAIN WITHOUT SCIATICA: Primary | ICD-10-CM

## 2021-12-27 DIAGNOSIS — G89.29 CHRONIC MIDLINE LOW BACK PAIN WITHOUT SCIATICA: Primary | ICD-10-CM

## 2021-12-27 DIAGNOSIS — M51.36 LUMBAR DEGENERATIVE DISC DISEASE: ICD-10-CM

## 2022-01-10 ENCOUNTER — TELEPHONE (OUTPATIENT)
Dept: PAIN MANAGEMENT | Age: 47
End: 2022-01-10

## 2022-01-10 NOTE — FLOWSHEET NOTE
[] [facility]) - [facility]  [address]    [phone]  F: [phone]   [x] [address]  [facility]   [address]  P: [phone]  F: [phone]  [] [place]  [address]  P: [phone]  F: [phone] [] [address]  P: [phone]  F: [phone]  [] [address]  [address] [address] [facility] [address]   [address]   [place]: [phone]  F: [phone]   [] [facility]  [address] [facility] [address]  [place]: [phone]   F: [phone]     Physical Therapy Cancel/No Show note    Date: 1/10/2022  Patient: Maria Luisa Yanes  : 1975  MRN: [id number]    Cancels/No Shows to date:      For today's appointment patient:    [x]  Cancelled    [] Rescheduled appointment    [] No-show     Reason given by patient:    []  Patient ill    []  Conflicting appointment    [] No transportation      [] Conflict with work    [] No reason given    [] Weather related    [] [id number]    [x] Other:      Comments:  Patient called to cancel for appt on 2022. He states he wanted to check his policy and check with pain management. He will call us back to reschedule.     [] Next appointment was confirmed    Electronically signed by: Mega Chaparro PT

## 2022-01-11 ENCOUNTER — HOSPITAL ENCOUNTER (OUTPATIENT)
Dept: PHYSICAL THERAPY | Facility: CLINIC | Age: 47
Setting detail: THERAPIES SERIES
Discharge: HOME | End: 2022-01-11

## 2022-01-13 ENCOUNTER — HOSPITAL ENCOUNTER (OUTPATIENT)
Dept: PAIN MANAGEMENT | Age: 47
Discharge: HOME | End: 2022-01-13
Payer: MEDICARE

## 2022-01-13 ENCOUNTER — HOSPITAL ENCOUNTER (EMERGENCY)
Age: 47
Discharge: HOME | End: 2022-01-13
Attending: EMERGENCY MEDICINE
Payer: MEDICARE

## 2022-01-13 VITALS
HEART RATE: 72 BPM | TEMPERATURE: 97.5 F | OXYGEN SATURATION: 98 % | SYSTOLIC BLOOD PRESSURE: 141 MMHG | HEIGHT: 67 IN | BODY MASS INDEX: 27.31 KG/M2 | RESPIRATION RATE: 16 BRPM | WEIGHT: 174 LBS | DIASTOLIC BLOOD PRESSURE: 83 MMHG

## 2022-01-13 VITALS
HEIGHT: 67 IN | WEIGHT: 174 LBS | SYSTOLIC BLOOD PRESSURE: 137 MMHG | BODY MASS INDEX: 27.31 KG/M2 | OXYGEN SATURATION: 98 % | HEART RATE: 80 BPM | DIASTOLIC BLOOD PRESSURE: 89 MMHG | RESPIRATION RATE: 16 BRPM | TEMPERATURE: 97.2 F

## 2022-01-13 DIAGNOSIS — M54.16 RIGHT LUMBAR RADICULITIS: Chronic | ICD-10-CM

## 2022-01-13 DIAGNOSIS — J01.00 ACUTE MAXILLARY SINUSITIS, RECURRENCE NOT SPECIFIED: Primary | ICD-10-CM

## 2022-01-13 DIAGNOSIS — G89.29 CHRONIC BILATERAL LOW BACK PAIN WITHOUT SCIATICA: Primary | ICD-10-CM

## 2022-01-13 DIAGNOSIS — M51.26 LUMBAR DISC HERNIATION: Chronic | ICD-10-CM

## 2022-01-13 DIAGNOSIS — M54.50 CHRONIC BILATERAL LOW BACK PAIN WITHOUT SCIATICA: Primary | ICD-10-CM

## 2022-01-13 LAB
SARS-COV-2, RAPID: NOT DETECTED
SPECIMEN DESCRIPTION: NORMAL

## 2022-01-13 PROCEDURE — 99203 OFFICE O/P NEW LOW 30 MIN: CPT

## 2022-01-13 PROCEDURE — 87635 SARS-COV-2 COVID-19 AMP PRB: CPT

## 2022-01-13 PROCEDURE — 99283 EMERGENCY DEPT VISIT LOW MDM: CPT

## 2022-01-13 PROCEDURE — 99244 OFF/OP CNSLTJ NEW/EST MOD 40: CPT | Performed by: ANESTHESIOLOGY

## 2022-01-13 RX ORDER — AMOXICILLIN AND CLAVULANATE POTASSIUM 875; 125 MG/1; MG/1
1 TABLET, FILM COATED ORAL 2 TIMES DAILY
Qty: 20 TABLET | Refills: 0 | Status: SHIPPED | OUTPATIENT
Start: 2022-01-13 | End: 2022-01-23

## 2022-01-13 RX ORDER — GABAPENTIN 100 MG/1
100 CAPSULE ORAL 2 TIMES DAILY
Qty: 60 CAPSULE | Refills: 0 | Status: SHIPPED | OUTPATIENT
Start: 2022-01-13 | End: 2022-02-11 | Stop reason: SINTOL

## 2022-01-13 ASSESSMENT — PAIN DESCRIPTION - PAIN TYPE: TYPE: CHRONIC PAIN

## 2022-01-13 ASSESSMENT — PAIN DESCRIPTION - DIRECTION: RADIATING_TOWARDS: RIGHT SIDE OF BACK

## 2022-01-13 ASSESSMENT — PAIN SCALES - GENERAL: PAINLEVEL_OUTOF10: 4

## 2022-01-13 ASSESSMENT — PAIN DESCRIPTION - ONSET: ONSET: GRADUAL

## 2022-01-13 ASSESSMENT — PAIN DESCRIPTION - LOCATION: LOCATION: BACK

## 2022-01-13 ASSESSMENT — ENCOUNTER SYMPTOMS
RESPIRATORY NEGATIVE: 1
SINUS PAIN: 1
BACK PAIN: 1
RHINORRHEA: 1
SINUS PRESSURE: 1

## 2022-01-13 ASSESSMENT — PAIN - FUNCTIONAL ASSESSMENT: PAIN_FUNCTIONAL_ASSESSMENT: PREVENTS OR INTERFERES SOME ACTIVE ACTIVITIES AND ADLS

## 2022-01-13 ASSESSMENT — PAIN DESCRIPTION - FREQUENCY: FREQUENCY: CONTINUOUS

## 2022-01-13 ASSESSMENT — PAIN DESCRIPTION - PROGRESSION: CLINICAL_PROGRESSION: GRADUALLY IMPROVING

## 2022-01-13 ASSESSMENT — PAIN DESCRIPTION - ORIENTATION: ORIENTATION: RIGHT

## 2022-01-13 NOTE — ED PROVIDER NOTES
[address]  eMERGENCY dEPARTMENT eNCOUnter      Pt Name: Jose Juan Bear  MRN: [id number]  [place] 1975  Date of evaluation: 1/13/2022  Provider: Jahaira Sidhu PA-C    CHIEF COMPLAINT       Chief Complaint   Patient presents with    Concern For COVID-19           HISTORY OF PRESENT ILLNESS  (Location/Symptom, Timing/Onset, Context/Setting, Quality, Duration, Modifying Factors, Severity.)   Jose Juan Bear is a 55 y.o. male who presents to the emergency department for evaluation of scratchy throat, post nasal drip, itchy ears, nasal congestion, and nausea x 3 weeks. He denies chest pain, sob, abd pain, fevers, headaches. Pt has tried mucinex, nasal spray and claritin with no relief. 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 Pt would like tested just to be safe. He is vaccinated. No other complaints. Nursing Notes were reviewed. REVIEW OF SYSTEMS    (2-9 systems for level 4, 10 or more for level 5)     Review of Systems   Scratchy throat  Post nasal drip   Itchy ears  Congestion  Nausea      Except as noted above the remainder of the review of systems was reviewed and negative.        PAST MEDICAL HISTORY     Past Medical History:   Diagnosis Date    Allergic rhinitis     Anxiety     Cervical spine pain 1/3/2019    Gastric reflux 3/30/2016    Headache(784.0)     Hepatitis     Hyperlipidemia 5/31/2018    Hypertension     IBS (irritable bowel syndrome) 12/17/2014    Insomnia     Iron deficiency anemia     Lumbar degenerative disc disease 12/13/2021    Mild single current episode of major depressive disorder ([facility] [address] 8/2/2018    Pancreatitis     Seasonal allergies      None otherwise stated in nurses notes    SURGICAL HISTORY       Past Surgical History:   Procedure Laterality Date    ANKLE SURGERY  02/23/14    HAND SURGERY  08/02/13    URETHRA SURGERY  1985     None otherwise stated in nurses notes    [address] Discharge Medication List as of 1/13/2022 10:55 AM      CONTINUE these medications which have NOT CHANGED    Details   Multiple Vitamin (MULTIVITAMIN ADULT PO) Take by mouth dailyHistorical Med      losartan (COZAAR) 50 MG tablet TAKE 1 & 1/2 TABLETS BY MOUTH DAILY, Disp-90 tablet, R-1Normal      fluticasone (FLONASE) 50 MCG/ACT nasal spray 1 spray by Each Nostril route daily, Disp-16 g, R-0Print      ibuprofen (ADVIL;MOTRIN) 600 MG tablet Take 1 tablet by mouth every 6 hours as needed for Pain, Disp-20 tablet, R-0Print      cetirizine (ZYRTEC) 10 MG tablet TAKE 1 TABLET BY MOUTH DAILY, Disp-30 tablet, R-3Normal      vitamin D3 (CHOLECALCIFEROL) 25 MCG (1000 UT) TABS tablet Take 1 tablet by mouth daily, Disp-90 tablet, R-1Normal      citalopram (CELEXA) 10 MG tablet TAKE 1 TABLET BY MOUTH DAILY, Disp-30 tablet, R-3Normal      baclofen (LIORESAL) 10 MG tablet Take 1 tablet by mouth 2 times daily as needed (pain), Disp-30 tablet, R-0Normal      clotrimazole-betamethasone (LOTRISONE) 1-0.05 % cream Apply topically 2 times daily. , Disp-45 g,R-0, Normal      EPINEPHrine (EPIPEN) 0.3 MG/0.3ML SOAJ injection Inject 1 mL into the muscle as needed (Anaphylaxis) Use as directed for allergic reaction, Disp-2 each, R-0Print      Elastic Bandages & Supports (KNEE BRACE/HINGED/LARGE) MISC Use daily for knee pain, Disp-1 each, R-0Print      Blood Pressure KIT Disp-1 kit, R-0, PrintDx: HTN. Needs automatic blood pressure machine to monitor his blood pressure. Ciclopirox (LOPROX) 0.77 % gel Apply in between toes twice daily. , Disp-1 Tube, R-3, Normal      melatonin 5 MG TABS tablet Take 5 mg by mouth daily Historical Med             ALLERGIES     Patient has no known allergies.     FAMILY HISTORY           Problem Relation Age of Onset    Hypertension Mother     Diabetes Mother      Family Status   Relation Name Status    Mother  Alive    Father  Alive      None otherwise stated in nurses notes    SOCIAL HISTORY reports that he quit smoking about 3 years ago. His smoking use included cigars. He has never used smokeless tobacco. He reports current alcohol use. He reports that he does not use drugs. lives at home with others     PHYSICAL EXAM    (up to 7 for level 4, 8 or more for level 5)     ED Triage Vitals [01/13/22 1021]   BP Temp Temp Source Pulse Resp SpO2 Height Weight   137/89 97.2 °F (36.2 °C) Temporal 80 16 98 % 5' 7\" (1.702 m) 174 lb (78.9 kg)       Physical Exam   Nursing note and vitals reviewed. Constitutional: Oriented to person, place, and time and well-developed, well-nourished. Head: Normocephalic and atraumatic. Ear: External ears normal. Left ear - scab noted in canal.  There is no bleeding. No FB. TM non-erythematous with no bulging. Nose: Nose normal and midline. Eyes: Conjunctivae and EOM are normal. Pupils are equal, round, and reactive to light. Neck: Normal range of motion. Neck supple. Throat: mask not removed due to pandemic. Cardiovascular: Normal rate, regular rhythm, normal heart sounds and intact distal pulses. Pulmonary/Chest: Effort normal and breath sounds normal. No respiratory distress. No wheezes. No rales. No chest tenderness. Abdominal: Soft. Bowel sounds are normal. No distension and no mass. There is no tenderness. There is no rebound and no guarding. Musculoskeletal: Normal range of motion. Neurological: Alert and oriented to person, place, and time. GCS score is 15. Skin: Skin is warm and dry. No rash noted. No erythema. No pallor. Psychiatric: Mood, memory, affect and judgment normal.           DIAGNOSTIC RESULTS     EKG: All EKG's are interpreted by the Emergency Department Physician who either signs or Co-signs this chart in the absence of a cardiologist.        RADIOLOGY:   All plain film, CT, MRI, and formal ultrasound images (except ED bedside ultrasound) are read by the radiologist, see reports below, unless otherwise noted in MDM or here. No orders to display       MRI LUMBAR SPINE WO CONTRAST    Result Date: 12/22/2021  EXAMINATION: MRI OF THE LUMBAR SPINE WITHOUT CONTRAST, 12/22/2021 5:52 pm TECHNIQUE: Multiplanar multisequence MRI of the lumbar spine was performed without the administration of intravenous contrast. COMPARISON: None. HISTORY: ORDERING SYSTEM PROVIDED HISTORY: Lumbar degenerative disc disease TECHNOLOGIST PROVIDED HISTORY: pain Reason for Exam: Lumbar degenerative disc disease Additional signs and symptoms: pain FINDINGS: BONES/ALIGNMENT: There is straightening of lumbar lordosis. The vertebral body heights are maintained. The bone marrow signal appears unremarkable. SPINAL CORD: The conus terminates normally. SOFT TISSUES: No paraspinal mass identified. L1-L2: There is no significant disc herniation, spinal canal stenosis or neural foraminal narrowing. L2-L3: There is no significant disc herniation, spinal canal stenosis or neural foraminal narrowing. L3-L4: There is no significant disc herniation, spinal canal stenosis or neural foraminal narrowing. L4-L5: Grade 1 retrolisthesis, disc bulging with 3 mm left foraminal disc extrusion with superior migration. The extruded disc contacts exiting left L4 nerve root. Mild bilateral facet arthropathy. Mild bilateral neural foraminal narrowing. L5-S1: Grade 1 retrolisthesis with 3 mm disc bulging effaces the anterior thecal sac. Mild bilateral facet arthropathy. Mild bilateral neural foraminal narrowing. S1-S2: Unremarkable. Straightening of lumbar lordosis. Transitional lumbar anatomy. At L4-L5, grade 1 retrolisthesis, mild bilateral neural foraminal narrowing. Left foraminal disc extrusion with superior migration contacts exiting left L4 nerve root. At L5-S1,Grade 1 retrolisthesis and mild bilateral neural foraminal narrowing.  RECOMMENDATIONS: Unavailable             LABS:  Labs Reviewed   COVID-19, RAPID       All other labs were within normal range or not returned as of this dictation. EMERGENCY DEPARTMENT COURSE and DIFFERENTIAL DIAGNOSIS/MDM:   Vitals:    Vitals:    01/13/22 1021   BP: 137/89   Pulse: 80   Resp: 16   Temp: 97.2 °F (36.2 °C)   TempSrc: Temporal   SpO2: 98%   Weight: 174 lb (78.9 kg)   Height: 5' 7\" (1.702 m)         Patient instructed to return to the emergency room if symptoms worsen, return, or any other concern right away which is agreed by the patient    ED MEDS:  Orders Placed This Encounter   Medications    amoxicillin-clavulanate (AUGMENTIN) 875-125 MG per tablet     Sig: Take 1 tablet by mouth 2 times daily for 10 days     Dispense:  20 tablet     Refill:  0         CONSULTS:  None    PROCEDURES:  None      FINAL IMPRESSION      1. Acute maxillary sinusitis, recurrence not specified          DISPOSITION/PLAN   DISPOSITION Decision To Discharge    PATIENT REFERRED TO:  Lou Rivera MD  [address]  [address] [facility] [id number] [address] [facility]  [facility] [address]  [phone]          DISCHARGE MEDICATIONS:  Discharge Medication List as of 1/13/2022 10:55 AM      START taking these medications    Details   amoxicillin-clavulanate (AUGMENTIN) 875-125 MG per tablet Take 1 tablet by mouth 2 times daily for 10 days, Disp-20 tablet, R-0Print               Summation      Patient Course:    Congestion, post nasal drip, nausea, itchy throat and ears x 3 weeks. covid is negative. Suspect sinusitis. Will start on augmentin. Recommend continuing OTC medications. Discussed results and plan with the pt. They expressed appropriate understanding. Pt given close follow up, supportive care instructions and strict return instructions at the bedside.     #331 & 332:  Antibiotic use with Sinusitis  *if the second, third, or fourth prompt is selected, only then should the clinician see the sub-prompts addressing amoxicillin     [x] The patient has sinusitis with symptom onset greater than 10 days ago and the patient was prescribed antibiotics. [SATISFIES MIPS PERFORMANCE]  [x] Patient was prescribed an amoxicillin-based antibiotic. The care is provided during an unprecedented national emergency due to the novel coronavirus, COVID-19. ED Medications administered this visit:  Medications - No data to display    New Prescriptions from this visit:    Discharge Medication List as of 1/13/2022 10:55 AM      START taking these medications    Details   amoxicillin-clavulanate (AUGMENTIN) 875-125 MG per tablet Take 1 tablet by mouth 2 times daily for 10 days, Disp-20 tablet, R-0Print             Follow-up:  Abbi Martinez MD  [address] [id number]  [address] [facility]  [address]            Final Impression:   1.  Acute maxillary sinusitis, recurrence not specified               (Please note that portions of this note were completed with a voice recognition program )        Ana Irwin 82, PA-C  01/13/22 0574

## 2022-01-13 NOTE — PROGRESS NOTES
[facility] Pain Management  Patient Pain Assessment  Dr. Rahat Mcguire Consultation/ Follow Up     Primary Care Physician: Hemant Murphy MD    Chief complaint:   Chief Complaint   Patient presents with    Back Pain   . HISTORY OF PRESENT ILLNESS:  Dia Lawton is 55 y.o. male with    HPI  49-year-old female with history of chronic lower back pain onset of symptom more than 1 year ago  Pain located in the lower lumbar area predominantly right-sided  Pain is constant progressively worsening  Describes it as aching throbbing burning sensation  Reports extension of pain down right leg in a nondermatomal fashion  Aggravating factors routine daily life activities  Alleviating factors are Aspercreme and cold application and repositioning    No previous lumbar spine injection history  No previous lumbar spine surgical history  Patient of tried NSAIDs  He did physical therapy and did not notice much improvement  Had recent MRI lumbar spine    Average pain is good 7 range between 3-8 over 10  Patient denies any loss of bladder or bowel control  No history of fever chills or weight loss       No flowsheet data found.     Current Pain Assessment  Pain Assessment  Pain Assessment: 0-10  Pain Level: 4 (increases with bending)  Patient's Stated Pain Goal: 2  Pain Type: Chronic pain  Pain Location: Back  Pain Orientation: Right  Pain Radiating Towards: right side of back  Pain Descriptors: Aching,Constant,Cramping,Sharp,Tingling (right leg and hip tingling.)  Pain Frequency: Continuous  Pain Onset: Gradual  Clinical Progression: Gradually improving  Functional Pain Assessment: Prevents or interferes some active activities and ADLs  Non-Pharmaceutical Pain Intervention(s): Repositioned,Rest,Cold applied  Response to Pain Intervention:  (aspercream)     ADVERSE MEDICATION EFFECTS:   Constipation: no  Bowel Regimen: No:   Diet: common adult  Appetite:  ok  Sedation:  no  Urinary Retention: no    FOCUSED PAIN SCALE:  Highest : 7  Lowest :4  Average: Range-6  When and What  was your last procedure:      Was your procedureeffective:  not applicable    ACTIVITY/SOCIAL/EMOTIONAL:  Sleep Pattern: 8 hours per night.nightime awakenings  Energy Level: Tired/Fatigued  Currently attending Physical Therapy:  Yes  Chiropractic Treatments: Yes  Home Exercises: daily walking stretching  Mobility:yes  Currently seeing a Psychiatristor Psychologist:  No  Emotional Issues: normal   Mood: appropriate     ABERRANT BEHAVIORS SINCE LAST VISIT:  Have you ever been treated in another Pain Clinic no  Refills for prescriptions appropriate: not applicable  Lost rx/pills: not applicable  Taking more medication than prescribed:  not applicable  Are you receiving PAIN medications from  other doctors: no  Last Urine/Serum Drug Screen :  Was Serum/UDS as anticipated? Will collect today if needed  Brought pill bottles in :not applicable   Was Pill count appropriate? :not applicable   Are currently pregnant? not applicable  Recent ER visits: Yes for sinus infection      Previous management history:   Previous diagnostic workup: X-ray and MRI     Previous Medications tried:  - NSAID's: no  - Neurontin: No  - Lyrica :No  - Trycyclic antidepressant:(Ellavil / Pamelor): No   - Cymbalta:No  - Opioids (Ultram / Vicodin / Percocet / Morphine / Dilaudid / Oramorph/ Fentanyl etc.):Yes Norco years ago did help with pain. Alos tried percocet and tylenol #3 years ago with past injuries and did help.     SOAP:   4  BP (!) 141/83   Pulse 72   Temp 97.5 °F (36.4 °C) (Infrared)   Resp 16   Ht 5' 7\" (1.702 m)   Wt 174 lb (78.9 kg)   SpO2 98%   BMI 27.25 kg/m²       Past Medical History      Diagnosis Date    Allergic rhinitis     Anxiety     Cervical spine pain 1/3/2019    Gastric reflux 3/30/2016    Headache(784.0)     Hepatitis     Hyperlipidemia 5/31/2018    Hypertension     IBS (irritable bowel syndrome) 12/17/2014    Insomnia     Iron deficiency anemia     Lumbar degenerative disc disease 12/13/2021    Mild single current episode of major depressive disorder ([facility] [address] 8/2/2018    Pancreatitis     Right lumbar radiculitis 1/13/2022    Seasonal allergies        Surgical History  Past Surgical History:   Procedure Laterality Date    ANKLE SURGERY  02/23/14    HAND SURGERY  08/02/13    URETHRA SURGERY  1985       Medications  Current Outpatient Medications   Medication Sig Dispense Refill    gabapentin (NEURONTIN) 100 MG capsule Take 1 capsule by mouth 2 times daily for 30 days. Intended supply: 90 days 60 capsule 0    amoxicillin-clavulanate (AUGMENTIN) 875-125 MG per tablet Take 1 tablet by mouth 2 times daily for 10 days 20 tablet 0    Multiple Vitamin (MULTIVITAMIN ADULT PO) Take by mouth daily      losartan (COZAAR) 50 MG tablet TAKE 1 & 1/2 TABLETS BY MOUTH DAILY 90 tablet 1    fluticasone (FLONASE) 50 MCG/ACT nasal spray 1 spray by Each Nostril route daily 16 g 0    ibuprofen (ADVIL;MOTRIN) 600 MG tablet Take 1 tablet by mouth every 6 hours as needed for Pain 20 tablet 0    cetirizine (ZYRTEC) 10 MG tablet TAKE 1 TABLET BY MOUTH DAILY 30 tablet 3    vitamin D3 (CHOLECALCIFEROL) 25 MCG (1000 UT) TABS tablet Take 1 tablet by mouth daily 90 tablet 1    citalopram (CELEXA) 10 MG tablet TAKE 1 TABLET BY MOUTH DAILY 30 tablet 3    baclofen (LIORESAL) 10 MG tablet Take 1 tablet by mouth 2 times daily as needed (pain) (Patient not taking: Reported on 9/13/2021) 30 tablet 0    clotrimazole-betamethasone (LOTRISONE) 1-0.05 % cream Apply topically 2 times daily. 45 g 0    EPINEPHrine (EPIPEN) 0.3 MG/0.3ML SOAJ injection Inject 1 mL into the muscle as needed (Anaphylaxis) Use as directed for allergic reaction 2 each 0    Elastic Bandages & Supports (KNEE BRACE/HINGED/LARGE) MISC Use daily for knee pain 1 each 0    Blood Pressure KIT Dx: HTN. Needs automatic blood pressure machine to monitor his blood pressure.  1 kit 0    Ciclopirox (LOPROX) 0.77 % gel Apply in between toes twice daily. 1 Tube 3    melatonin 5 MG TABS tablet Take 5 mg by mouth daily        No current facility-administered medications for this encounter. Allergies  Patient has no known allergies. Family History  family history includes Diabetes in his mother; Hypertension in his mother. Social History  Social History     Socioeconomic History    Marital status:      Spouse name: Not on file    Number of children: Not on file    Years of education: Not on file    Highest education level: Not on file   Occupational History    Not on file   Tobacco Use    Smoking status: Former Smoker     Types: Cigars     Quit date: 11/16/2018     Years since quitting: 3.1    Smokeless tobacco: Never Used    Tobacco comment: quit 2016   Substance and Sexual Activity    Alcohol use: Yes     Alcohol/week: 0.0 standard drinks     Comment: beer once week    Drug use: Yes     Types: Marijuana Deboraha Sanes)    Sexual activity: Not on file   Other Topics Concern    Not on file   Social History Narrative    Not on file     Social Determinants of Health     Financial Resource Strain: Low Risk     Difficulty of Paying Living Expenses: Not hard at all   Food Insecurity: No Food Insecurity    Worried About [address] [place] in the Last Year: Never true    [address] in the Last Year: Never true   Transportation Needs: No Transportation Needs    Lack of Transportation (Medical): No    Lack of Transportation (Non-Medical):  No   Physical Activity:     Days of Exercise per Week: Not on file    Minutes of Exercise per Session: Not on file   Stress:     Feeling of Stress : Not on file   Social Connections:     Frequency of Communication with Friends and Family: Not on file    Frequency of Social Gatherings with Friends and Family: Not on file    Attends Congregation Services: Not on file    Active Member of Clubs or Organizations: Not on file    Attends Club or Organization Meetings: Not on file    Marital Status: Not on file   Intimate Partner Violence:     Fear of Current or Ex-Partner: Not on file    Emotionally Abused: Not on file    Physically Abused: Not on file    Sexually Abused: Not on file   Housing Stability: Unknown    Unable to Pay for Housing in the Last Year: No    Number of [place] in the Last Year: Not on file    Unstable Housing in the Last Year: No      reports current drug use. Drug: Marijuana Fredick Copping). REVIEW OF SYSTEMS:  Review of Systems   Constitutional: Positive for fatigue. Negative for fever. HENT: Positive for congestion, rhinorrhea, sinus pressure and sinus pain. Eyes:        Wear corrective legs   Respiratory: Negative. Cardiovascular:        HTN   Gastrointestinal:        GERD   Endocrine: Negative. Genitourinary: Negative. Musculoskeletal: Positive for arthralgias, back pain and joint swelling. Allergic/Immunologic: Positive for environmental allergies. Neurological: Positive for tremors, weakness and numbness. Hematological: Negative. Psychiatric/Behavioral: Negative. All other systems reviewed and are negative. Objective:  General Appearance:  Uncomfortable, in pain and well-appearing. Vital signs: (most recent): Blood pressure (!) 141/83, pulse 72, temperature 97.5 °F (36.4 °C), temperature source Infrared, resp. rate 16, height 5' 7\" (1.702 m), weight 174 lb (78.9 kg), SpO2 98 %. Vital signs are normal.  No fever. Output: Producing urine and producing stool. HEENT: Normal HEENT exam.    Lungs:  Normal effort and normal respiratory rate. He is not in respiratory distress. Heart: Normal rate. Extremities: Normal range of motion. There is no deformity. Neurological: Patient is alert and oriented to person, place and time. Normal strength. Patient has normal reflexes, normal muscle tone and normal coordination. Pupils:  Pupils are equal, round, and reactive to light.   Pupils are equal. Skin:  Warm and dry. No rash or cyanosis. Assessment & Plan     40-year-old female with history of chronic lower back pain onset of symptom more than 1 year ago  Pain located in the lower lumbar area predominantly right-sided  Pain is constant progressively worsening  Describes it as aching throbbing burning sensation  Reports extension of pain down right leg in a nondermatomal fashion  Aggravating factors routine daily life activities  Alleviating factors are Aspercreme and cold application and repositioning    No previous lumbar spine injection history  No previous lumbar spine surgical history  Patient of tried NSAIDs  He did physical therapy and did not notice much improvement  Had recent MRI lumbar spine    Average pain is good 7 range between 3-8 over 10  Patient denies any loss of bladder or bowel control  No history of fever chills or weight loss    Physical Therapy Daily Treatment Note        Date:  2021  Patient Name:  Dakotah Yuen                        :  1975                 MRN: [id number]  Physician: Sunny Sharma                               Insurance: [employer]  Medical Diagnosis: low back pain without sciatica M54.5, ADD neck pain M54.2            Rehab Codes: M54.5, M54.2  Onset Date: 21                     Next 's appt.: PRN  Visit# / total visits:                Cancels/No Shows: 0/0     Subjective: Pt reports that KT helped reduce pain symptoms in low back. Pt states he is still having pain in his neck when looking to R side and R shoulder feels tender. Pain:  [x]? Yes  []? No           Location: base of neck, low back  Pain Rating: (0-10 scale) 4-5/10  Pain altered Tx:  []? No  []? Yes  Action:   Comments:    EXAMINATION: MRI OF THE LUMBAR SPINE WITHOUT CONTRAST, 2021 5:52 pm    L4-L5: Grade 1 retrolisthesis, disc bulging with 3 mm left foraminal disc extrusion with superior migration.   The extruded disc contacts exiting left L4 nerve root.  Mild bilateral facet arthropathy. Mild bilateral neural foraminal narrowing. L5-S1: Grade 1 retrolisthesis with 3 mm disc bulging effaces the anterior thecal sac. Mild bilateral facet arthropathy. Mild bilateral neural foraminal narrowing. 1. Chronic bilateral low back pain without sciatica    2. Right lumbar radiculitis    3. Lumbar disc herniation      MRI lumbar spine  Report reviewed  Images reviewed independently  Finding discussed in detail with patient  L4 level lumbar disc bulging predominantly left-sided but her symptoms are right-sided    Will try gabapentin  If that do not help then consider for lumbar epidural steroid injection    Consultation note sent to the referring physician  No orders of the defined types were placed in this encounter. Orders Placed This Encounter   Medications    gabapentin (NEURONTIN) 100 MG capsule     Sig: Take 1 capsule by mouth 2 times daily for 30 days. Intended supply: 90 days     Dispense:  60 capsule     Refill:  0            This note was created using voice recognition software. There may be inaccuracies of transcription  that are inadvertently overlooked prior to the signature. There is anyquestions about the transcription please contact me.

## 2022-01-14 ENCOUNTER — TELEPHONE (OUTPATIENT)
Dept: FAMILY MEDICINE CLINIC | Age: 47
End: 2022-01-14

## 2022-01-14 NOTE — TELEPHONE ENCOUNTER
[facility]) ED Follow up Call    Reason for ED visit:  SINUSITIS     1/14/2022     Albert Gould , this is TERENCE  from Dr. Jinny Lr office, just calling to see how you are doing after your recent ED visit. Did you receive discharge instructions? Yes  Do you understand the discharge instructions? Yes  Did the ED give you any new prescriptions? Yes  Were you able to fill your prescriptions? Yes      Do you have one of our red, yellow and green  Zone sheets that help you to determine when you should go to the ED? No      Do you need or want to make a follow up appt with your PCP? Yes    Do you have any further needs in the home, e.g. equipment?   No        FU appts/Provider:    Future Appointments   Date Time Provider Angelina Manzo   2/11/2022  4:20 PM CHAIM Magallanes - CNP [address]   3/14/2022  3:00 PM [facility]

## 2022-01-20 DIAGNOSIS — K58.0 IRRITABLE BOWEL SYNDROME WITH DIARRHEA: ICD-10-CM

## 2022-01-21 RX ORDER — CITALOPRAM 10 MG/1
10 TABLET ORAL DAILY
Qty: 30 TABLET | Refills: 3 | Status: SHIPPED | OUTPATIENT
Start: 2022-01-21 | End: 2022-05-10

## 2022-01-21 NOTE — TELEPHONE ENCOUNTER
Please Approve or Refuse.   Send to Pharmacy per Pt's Request:   Next Visit Date:  3/14/2022   Last Visit Date: 12/13/2021    Hemoglobin A1C (%)   Date Value   12/13/2021 5.1   08/03/2021 6.0   01/16/2021 6.4 (H)             ( goal A1C is < 7)   BP Readings from Last 3 Encounters:   01/13/22 (!) 141/83   01/13/22 137/89   12/13/21 120/80          (goal 120/80)  BUN   Date Value Ref Range Status   01/16/2021 11 6 - 20 mg/dL Final     CREATININE   Date Value Ref Range Status   01/16/2021 1.03 0.70 - 1.20 mg/dL Final     Potassium   Date Value Ref Range Status   01/16/2021 3.9 3.7 - 5.3 mmol/L Final

## 2022-02-11 ENCOUNTER — HOSPITAL ENCOUNTER (OUTPATIENT)
Dept: PAIN MANAGEMENT | Age: 47
Discharge: HOME | End: 2022-02-11
Payer: MEDICARE

## 2022-02-11 DIAGNOSIS — M54.16 RIGHT LUMBAR RADICULITIS: ICD-10-CM

## 2022-02-11 DIAGNOSIS — M51.36 LUMBAR DEGENERATIVE DISC DISEASE: Primary | ICD-10-CM

## 2022-02-11 DIAGNOSIS — G89.29 CHRONIC BILATERAL LOW BACK PAIN WITHOUT SCIATICA: ICD-10-CM

## 2022-02-11 DIAGNOSIS — M51.26 LUMBAR DISC HERNIATION: ICD-10-CM

## 2022-02-11 DIAGNOSIS — M54.50 CHRONIC BILATERAL LOW BACK PAIN WITHOUT SCIATICA: ICD-10-CM

## 2022-02-11 PROCEDURE — 99213 OFFICE O/P EST LOW 20 MIN: CPT

## 2022-02-11 PROCEDURE — 99213 OFFICE O/P EST LOW 20 MIN: CPT | Performed by: NURSE PRACTITIONER

## 2022-02-11 RX ORDER — PREGABALIN 25 MG/1
25 CAPSULE ORAL 2 TIMES DAILY
Qty: 60 CAPSULE | Refills: 0 | Status: SHIPPED | OUTPATIENT
Start: 2022-02-11 | End: 2022-08-30 | Stop reason: SDUPTHER

## 2022-02-11 RX ORDER — GABAPENTIN 100 MG/1
100 CAPSULE ORAL 2 TIMES DAILY
Qty: 60 CAPSULE | Refills: 0 | OUTPATIENT
Start: 2022-02-11 | End: 2022-03-13

## 2022-02-11 ASSESSMENT — ENCOUNTER SYMPTOMS
SHORTNESS OF BREATH: 0
COUGH: 0
CONSTIPATION: 0
BACK PAIN: 1

## 2022-02-11 NOTE — PROGRESS NOTES
Chief Complaint: back pain    PMH Pt complains of low back pain. MRI lumbar with L4-L5 grade 1 retrolisthesis and mild bilateral neural foraminal narrowing. Left foraminal disc extrusion with superior migration contacts exiting left  L4 nerve root He has never had injections or surgery to the lumbar area. He is currently in PT with moderate benefit. He was prescribed gabapentin 100 mg BID and d/c due to the medication making him feel \"foggy\". Discussed trying lumbar steroid epidural and he declines. Back Pain  This is a chronic problem. The current episode started more than 1 year ago. The problem occurs constantly. The problem is unchanged. The pain is present in the lumbar spine. The quality of the pain is described as aching. The pain does not radiate. The pain is at a severity of 4/10. The pain is mild. The symptoms are aggravated by position, standing and bending (walking). Associated symptoms include tingling. Pertinent negatives include no chest pain, fever, numbness or paresthesias. He has tried analgesics, bed rest, NSAIDs and chiropractic manipulation (PT) for the symptoms. The treatment provided mild relief. Patient denies any new neurological symptoms. No bowel or bladder incontinence, no weakness, and no falling.     Past Medical History:   Diagnosis Date    Allergic rhinitis     Anxiety     Cervical spine pain 1/3/2019    Gastric reflux 3/30/2016    Headache(784.0)     Hepatitis     Hyperlipidemia 5/31/2018    Hypertension     IBS (irritable bowel syndrome) 12/17/2014    Insomnia     Iron deficiency anemia     Lumbar degenerative disc disease 12/13/2021    Mild single current episode of major depressive disorder ([facility] [address] 8/2/2018    Pancreatitis     Right lumbar radiculitis 1/13/2022    Seasonal allergies        Past Surgical History:   Procedure Laterality Date    ANKLE SURGERY  02/23/14    HAND SURGERY  08/02/13    URETHRA SURGERY  1985       No Known education: Not on file    Highest education level: Not on file   Occupational History    Not on file   Tobacco Use    Smoking status: Former Smoker     Types: Cigars     Quit date: 11/16/2018     Years since quitting: 3.2    Smokeless tobacco: Never Used    Tobacco comment: quit 2016   Substance and Sexual Activity    Alcohol use: Yes     Alcohol/week: 0.0 standard drinks     Comment: beer once week    Drug use: Yes     Types: Marijuana Ciarra Shaheed)    Sexual activity: Not on file   Other Topics Concern    Not on file   Social History Narrative    Not on file     Social Determinants of Health     Financial Resource Strain: Low Risk     Difficulty of Paying Living Expenses: Not hard at all   Food Insecurity: No Food Insecurity    Worried About [address] in the Last Year: Never true    [address] [place] in the Last Year: Never true   Transportation Needs: No Transportation Needs    Lack of Transportation (Medical): No    Lack of Transportation (Non-Medical):  No   Physical Activity:     Days of Exercise per Week: Not on file    Minutes of Exercise per Session: Not on file   Stress:     Feeling of Stress : Not on file   Social Connections:     Frequency of Communication with Friends and Family: Not on file    Frequency of Social Gatherings with Friends and Family: Not on file    Attends [employer] Services: Not on file    Active Member of Clubs or Organizations: Not on file    Attends Club or Organization Meetings: Not on file    Marital Status: Not on file   Intimate Partner Violence:     Fear of Current or Ex-Partner: Not on file    Emotionally Abused: Not on file    Physically Abused: Not on file    Sexually Abused: Not on file   Housing Stability: Unknown    Unable to Pay for Housing in the Last Year: No    Number of [place] in the Last Year: Not on file    Unstable Housing in the Last Year: No       Review of Systems:  Review of Systems   Constitutional: Negative for chills and fever.   Cardiovascular: Negative for chest pain and palpitations. Respiratory: Negative for cough and shortness of breath. Musculoskeletal: Positive for back pain. Gastrointestinal: Negative for constipation. Neurological: Positive for tingling. Negative for disturbances in coordination, loss of balance, numbness and paresthesias. Physical Exam:  There were no vitals taken for this visit. Physical Exam  HENT:      Head: Normocephalic. Pulmonary:      Effort: Pulmonary effort is normal.   Musculoskeletal:         General: Normal range of motion. Cervical back: Normal range of motion. Lumbar back: Tenderness present. Back:    Skin:     General: Skin is warm and dry. Neurological:      Mental Status: He is alert and oriented to person, place, and time. Record/Diagnostics Review:    As reviewed in PMH    Assessment:  Problem List Items Addressed This Visit     Right lumbar radiculitis (Chronic)    Relevant Medications    pregabalin (LYRICA) 25 MG capsule    Lumbar disc herniation (Chronic)    Relevant Medications    pregabalin (LYRICA) 25 MG capsule    Chronic bilateral low back pain without sciatica    Lumbar degenerative disc disease - Primary    Relevant Medications    pregabalin (LYRICA) 25 MG capsule             Treatment Plan:  Discussed different treatment options including continued conservative care such as physical therapy, chiropractic care, acupuncture. He is doing well with PT and plans to continue sessions  Discussed different interventional options such as epidural steroids or medial branch blocks. He declines - does not want injections.   D/C gabapentin - he brought in pills - discarded in RX destroyer with patient present  Start Lyrica 25 mg BID  Follow up in four weeks

## 2022-02-15 DIAGNOSIS — I10 ESSENTIAL HYPERTENSION: ICD-10-CM

## 2022-02-15 RX ORDER — LOSARTAN POTASSIUM 50 MG/1
TABLET ORAL
Qty: 90 TABLET | Refills: 1 | Status: SHIPPED | OUTPATIENT
Start: 2022-02-15

## 2022-03-11 DIAGNOSIS — M51.36 LUMBAR DEGENERATIVE DISC DISEASE: ICD-10-CM

## 2022-03-11 DIAGNOSIS — M51.26 LUMBAR DISC HERNIATION: ICD-10-CM

## 2022-03-15 ENCOUNTER — TELEPHONE (OUTPATIENT)
Dept: PAIN MANAGEMENT | Age: 47
End: 2022-03-15

## 2022-03-15 DIAGNOSIS — M51.26 LUMBAR DISC HERNIATION: ICD-10-CM

## 2022-03-15 DIAGNOSIS — E55.9 VITAMIN D DEFICIENCY: ICD-10-CM

## 2022-03-15 DIAGNOSIS — M51.36 LUMBAR DEGENERATIVE DISC DISEASE: ICD-10-CM

## 2022-03-15 RX ORDER — PREGABALIN 25 MG/1
CAPSULE ORAL
Qty: 60 CAPSULE | OUTPATIENT
Start: 2022-03-15

## 2022-03-15 RX ORDER — MELATONIN
1000 DAILY
Qty: 90 TABLET | Refills: 1 | Status: SHIPPED | OUTPATIENT
Start: 2022-03-15

## 2022-04-10 ENCOUNTER — HOSPITAL ENCOUNTER (EMERGENCY)
Age: 47
Discharge: HOME | End: 2022-04-10
Attending: EMERGENCY MEDICINE
Payer: MEDICARE

## 2022-04-10 VITALS
BODY MASS INDEX: 28.25 KG/M2 | DIASTOLIC BLOOD PRESSURE: 90 MMHG | RESPIRATION RATE: 16 BRPM | WEIGHT: 180 LBS | HEART RATE: 74 BPM | SYSTOLIC BLOOD PRESSURE: 140 MMHG | OXYGEN SATURATION: 99 % | TEMPERATURE: 98.4 F | HEIGHT: 67 IN

## 2022-04-10 DIAGNOSIS — R11.2 NON-INTRACTABLE VOMITING WITH NAUSEA, UNSPECIFIED VOMITING TYPE: Primary | ICD-10-CM

## 2022-04-10 LAB
INFLUENZA A: NOT DETECTED
INFLUENZA B: NOT DETECTED
SARS-COV-2 RNA, RT PCR: NOT DETECTED
SOURCE: NORMAL
SPECIMEN DESCRIPTION: NORMAL

## 2022-04-10 PROCEDURE — 96372 THER/PROPH/DIAG INJ SC/IM: CPT

## 2022-04-10 PROCEDURE — 87636 SARSCOV2 & INF A&B AMP PRB: CPT

## 2022-04-10 PROCEDURE — 6360000002 HC RX W HCPCS: Performed by: EMERGENCY MEDICINE

## 2022-04-10 PROCEDURE — 6370000000 HC RX 637 (ALT 250 FOR IP): Performed by: EMERGENCY MEDICINE

## 2022-04-10 PROCEDURE — 99284 EMERGENCY DEPT VISIT MOD MDM: CPT

## 2022-04-10 RX ORDER — KETOROLAC TROMETHAMINE 30 MG/ML
30 INJECTION, SOLUTION INTRAMUSCULAR; INTRAVENOUS ONCE
Status: COMPLETED | OUTPATIENT
Start: 2022-04-10 | End: 2022-04-10

## 2022-04-10 RX ORDER — ONDANSETRON 4 MG/1
4 TABLET, ORALLY DISINTEGRATING ORAL ONCE
Status: COMPLETED | OUTPATIENT
Start: 2022-04-10 | End: 2022-04-10

## 2022-04-10 RX ADMIN — KETOROLAC TROMETHAMINE 30 MG: 30 INJECTION, SOLUTION INTRAMUSCULAR; INTRAVENOUS at 21:39

## 2022-04-10 RX ADMIN — ONDANSETRON 4 MG: 4 TABLET, ORALLY DISINTEGRATING ORAL at 21:39

## 2022-04-10 ASSESSMENT — ENCOUNTER SYMPTOMS
BACK PAIN: 0
DIARRHEA: 0
VOMITING: 1
COUGH: 0
ABDOMINAL PAIN: 0
NAUSEA: 1
SHORTNESS OF BREATH: 0

## 2022-04-10 ASSESSMENT — PAIN SCALES - GENERAL: PAINLEVEL_OUTOF10: 7

## 2022-04-10 NOTE — Clinical Note
Jessi Perez was seen and treated in our emergency department on 4/10/2022. He may return to work on 04/12/2022. If you have any questions or concerns, please don't hesitate to call.       Yoandy Matthews MD

## 2022-04-11 NOTE — ED PROVIDER NOTES
EMERGENCY DEPARTMENT ENCOUNTER    Pt Name: Azalea Ardon  MRN: [id number]  [place] 1975  Date of evaluation: 4/10/22  CHIEF COMPLAINT       Chief Complaint   Patient presents with    Emesis     HISTORY OF PRESENT ILLNESS   HPI     Is a 28-year-old male comes in today with nausea and vomiting. The patient was at work 2 days ago where a girl vomited in the toilet then yesterday he had a migraine and he vomited 3 times over the night this morning he tried to drink some water but was not able to keep it down he has generalized body aches and continues to have a headache. Patient thinks that he contracted stomach bug. He states that he works 2 jobs and he works a lot he has not missed any days except for snow days. He denies any chest pain shortness of breath or cough. No abdominal pain    REVIEW OF SYSTEMS     Review of Systems   Constitutional: Negative for fever. HENT: Negative for congestion. Respiratory: Negative for cough and shortness of breath. Cardiovascular: Negative for chest pain. Gastrointestinal: Positive for nausea and vomiting. Negative for abdominal pain and diarrhea. Genitourinary: Negative for dysuria. Musculoskeletal: Positive for myalgias. Negative for back pain. Skin: Negative for rash. Neurological: Positive for headaches. All other systems reviewed and are negative.     PASTMEDICAL HISTORY     Past Medical History:   Diagnosis Date    Allergic rhinitis     Anxiety     Cervical spine pain 1/3/2019    Gastric reflux 3/30/2016    Headache(784.0)     Hepatitis     Hyperlipidemia 5/31/2018    Hypertension     IBS (irritable bowel syndrome) 12/17/2014    Insomnia     Iron deficiency anemia     Lumbar degenerative disc disease 12/13/2021    Mild single current episode of major depressive disorder ([facility] [address] 8/2/2018    Pancreatitis     Right lumbar radiculitis 1/13/2022    Seasonal allergies      SURGICAL HISTORY       Past Surgical History:   Procedure Laterality Date    ANKLE SURGERY  02/23/14    HAND SURGERY  08/02/13    [address] [address]     CURRENT MEDICATIONS       Discharge Medication List as of 4/10/2022 10:12 PM      CONTINUE these medications which have NOT CHANGED    Details   vitamin D3 (CHOLECALCIFEROL) 25 MCG (1000 UT) TABS tablet TAKE 1 TABLET BY MOUTH DAILY, Disp-90 tablet, R-1Normal      losartan (COZAAR) 50 MG tablet TAKE 1 & 1/2 TABLETS BY MOUTH DAILY, Disp-90 tablet, R-1Normal      pregabalin (LYRICA) 25 MG capsule Take 1 capsule by mouth 2 times daily for 30 days. , Disp-60 capsule, R-0Normal      citalopram (CELEXA) 10 MG tablet TAKE 1 TABLET BY MOUTH DAILY, Disp-30 tablet, R-3Normal      Multiple Vitamin (MULTIVITAMIN ADULT PO) Take by mouth dailyHistorical Med      fluticasone (FLONASE) 50 MCG/ACT nasal spray 1 spray by Each Nostril route daily, Disp-16 g, R-0Print      ibuprofen (ADVIL;MOTRIN) 600 MG tablet Take 1 tablet by mouth every 6 hours as needed for Pain, Disp-20 tablet, R-0Print      cetirizine (ZYRTEC) 10 MG tablet TAKE 1 TABLET BY MOUTH DAILY, Disp-30 tablet, R-3Normal      baclofen (LIORESAL) 10 MG tablet Take 1 tablet by mouth 2 times daily as needed (pain), Disp-30 tablet, R-0Normal      clotrimazole-betamethasone (LOTRISONE) 1-0.05 % cream Apply topically 2 times daily. , Disp-45 g,R-0, Normal      EPINEPHrine (EPIPEN) 0.3 MG/0.3ML SOAJ injection Inject 1 mL into the muscle as needed (Anaphylaxis) Use as directed for allergic reaction, Disp-2 each, R-0Print      Elastic Bandages & Supports (KNEE BRACE/HINGED/LARGE) MISC Use daily for knee pain, Disp-1 each, R-0Print      Blood Pressure KIT Disp-1 kit, R-0, PrintDx: HTN. Needs automatic blood pressure machine to monitor his blood pressure. Ciclopirox (LOPROX) 0.77 % gel Apply in between toes twice daily. , Disp-1 Tube, R-3, Normal      melatonin 5 MG TABS tablet Take 5 mg by mouth daily Historical Med           ALLERGIES     has No Known Allergies.   FAMILY HISTORY     He indicated that his mother is alive. He indicated that his father is alive. SOCIAL HISTORY       Social History     Tobacco Use    Smoking status: Former Smoker     Types: Cigars     Quit date: 11/16/2018     Years since quitting: 3.4    Smokeless tobacco: Never Used    Tobacco comment: quit 2016   Substance Use Topics    Alcohol use: Yes     Alcohol/week: 0.0 standard drinks     Comment: beer once week    Drug use: Yes     Types: Marijuana (Weed)     PHYSICAL EXAM     INITIAL VITALS: BP (!) 140/90   Pulse 74   Temp 98.4 °F (36.9 °C) (Oral)   Resp 16   Ht 5' 7\" (1.702 m)   Wt 180 lb (81.6 kg)   SpO2 99%   BMI 28.19 kg/m²    Physical Exam  Vitals and nursing note reviewed. Constitutional:       Appearance: He is well-developed. HENT:      Head: Normocephalic and atraumatic. Eyes:      Conjunctiva/sclera: Conjunctivae normal.      Pupils: Pupils are equal, round, and reactive to light. Cardiovascular:      Rate and Rhythm: Normal rate and regular rhythm. Heart sounds: No murmur heard. No friction rub. Pulmonary:      Effort: Pulmonary effort is normal. No respiratory distress. Breath sounds: Normal breath sounds. No wheezing or rhonchi. Abdominal:      General: There is no distension. Palpations: Abdomen is soft. Tenderness: There is no abdominal tenderness. There is no guarding or rebound. Musculoskeletal:      Cervical back: Neck supple. Skin:     General: Skin is warm and dry. Capillary Refill: Capillary refill takes less than 2 seconds. Neurological:      Mental Status: He is alert. Comments: Able to ambulate without difficulty       DIAGNOSTIC RESULTS   RADIOLOGY:All plain film, CT, MRI, and formal ultrasound images (except ED bedside ultrasound) are read by the radiologist, see reports below, unless otherwisenoted in MDM or here. No orders to display     LABS: All lab results were reviewed by myself, and all abnormals are listed below.   Labs Reviewed   COVID-19 & INFLUENZA COMBO       EMERGENCY DEPARTMENTCOURSE:   Differential diagnosis includes volume depletion viral syndrome appendicitis cholecystitis diverticulitis patient has a nontender abdominal exam do not believe that he has an acute intra-abdominal pathology at this time given the fact that somebody at work had similar symptoms suspect viral syndrome. We will give the patient Toradol for his headache he has a normal neurologic examination he is afebrile doubt meningitis or intracranial bleed    Patient feels better after Toradol COVID influenza negative patient will be discharged       Vitals:    Vitals:    04/10/22 2043   BP: (!) 140/90   Pulse: 74   Resp: 16   Temp: 98.4 °F (36.9 °C)   TempSrc: Oral   SpO2: 99%   Weight: 180 lb (81.6 kg)   Height: 5' 7\" (1.702 m)       The patient was given the following medications while in the emergency department:  Orders Placed This Encounter   Medications    ondansetron (ZOFRAN-ODT) disintegrating tablet 4 mg    ketorolac (TORADOL) injection 30 mg         FINAL IMPRESSION      1.  Non-intractable vomiting with nausea, unspecified vomiting type         DISPOSITION/PLAN   DISPOSITION Decision To Discharge 04/10/2022 10:11:57 PM      PATIENT REFERRED TO:  Nisha Ng MD  [address] [facility]  [address] [facility] [phone]      As needed  Mendel Bridgeman, MD  Attending Emergency Physician    This charting supersedes any ED resident or staff charting and was written using speech recognition Luis Miguel Chase MD  04/11/22 0510

## 2022-04-11 NOTE — FLOWSHEET NOTE
[facility]   [address]   Phone: [phone]   Fax: [phone]    Physical Therapy Daily Treatment Note      Date:  9/10/2021  Patient Name:  Tess Levine    :  1975  MRN: [id number]  Physician: Marien Homans                               Insurance: [employer]  Medical Diagnosis: low back pain without sciatica M54.5, ADD neck pain M54.2            Rehab Codes: M54.5, M54.2  Onset Date: 21                     Next 's appt.: PRN  Visit# / total visits:   Cancels/No Shows: 0/1    Subjective: Pt with improved overall back pain and improved overall neck pain. Still getting intermittent symptoms at this time. Has been given HEP which includes general lumbar mobility, general cervical mobility program, and then postural stabilization program at this time. Patient to continued these at home at this time- fair overall prognosis for full function secondary to continued overall symptoms intermittently, but has full exercise program at his disposal to help with current symptoms.   Pain:  [x] Yes  [] No Location: base of neck, low back Pain Rating: (0-10 scale) 4-5/10  Pain altered Tx:  [] No  [] Yes  Action:   Comments:    Objective:  Modalities:   Precautions:  Exercises:  Exercise Reps/ Time Weight/ Level Completed  Today Comments   Seated Cervical ROM F/E, Rotation, Lat side bending 10x5\"      Supine Chin Tucks 10x5\"   With towel roll   Supine Scapular Retraction 2x10  x  in seated   LTR 10x10\" hold  x    Prone scapular protraction/retraction 10x2  x Added    Prone laying  3'  x    AISHA 3'      Press Ups 2x10    no change in symptoms   Standing Back Ext 10x5\"      SKTC 3x30\"      DKTC 3x30\"      Supine HS stretch 3x30\"      Supine Active HS stretch 10x2      Supine marching with TrA 10x  x Added    Seated Back Flex 3x30\"      Posterior capsule stretch 2x 30\"   Added    Thoracic stretch 2x 30\"   Added  Other: Manual: cervical traction, trigger point and suboccipital release, KT to RUT and R Paraspinals 15'         STRENGTH   STRENGTH   ROM     Left Right   Left Right Cervical     C5 Shld Abd     L1-2 Hip Flex     Flexion 40   Shld Flexion     Hip Abd     Extension 60 C7 pain   Shld IR     L3-4 Knee Ext     Rotation L 55 % R 55    Shld ER     L4 Ankle DF     Sidebend L 30 R 30 $   C6 Elb Flex     L5 EHL     Retraction     C7 Elb Ext     S1 Plant. Flex     Lumbar     C8 EPL     Abdominals     Flexion 1/2 distal from patella to ankles   T1 Fing Abd     Erector Spinae     Extension  10 degrees (R) sided pain              Rotation L  R               Sidebend L R               UE/LE                                                                                                           $ = (R) neck discomfort with (L) rotation and (R) SB    Assessment: [] Progressing toward goals. [] No change. [x] Other:Discharge to HEP- fair overall prognosis    [x] Patient would continue to benefit from skilled physical therapy services in order to: Reduce Cervical and Thoracic Back tightness and improve mobility. STG: (to be met in 6 treatments)  1. ? Pain: Improved pain levels to 2-3/10 at worst PROGRESSING  2. ? ROM: Improved cervical ROM to ~ (B) with end range discomfort only MET. Patient with improved lumbar mobility to ankles for flexion NOT MET and 10 degrees extension MET  3. ? Function: Patient with improved tolerance of turning head by 50% self report MET, improved overhead use of the arms by 50% self report MET, improved bending ADLs by 50% self report MET. 4. Independent with Home Exercise Programs MET    LTG: (to be met in 12 treatments)  6. ? Pain: Improved pain levels to 0-1/10 at worst PROGRESSING  7. ? ROM: Improved cervical ROM to ~ (B) with no pain PROGRESSING.   Patient with improved lumbar mobility to ankles for flexion NOT MET and 15 degrees extension PROGRESSING  8. ? Function: Patient with improved tolerance of turning head by 80% self report PROGRESSING, improved overhead use of the arms by 80% self report PROGRESSING, improved bending ADLs by 80% self report PROGRESSING. 9. Independent with Home Exercise Programs MET  Patient goals: reduce ache, find out exercises to be less prone to injury     Pt. Education:  [x] Yes  [] No  [] Reviewed Prior HEP/Ed  Method of Education: [x] Verbal  [x] Demo  [] Written  Comprehension of Education:  [] Verbalizes understanding. [x] Demonstrates understanding. [x] Needs review. [] Demonstrates/verbalizes HEP/Ed previously given. Plan: [x] Continue per plan of care.    [] Other:      Treatment Charges: Mins Units   []  Modalities     [x]  Ther Exercise 30 2   [x]  Manual Therapy 15 1   []  Ther Activities     []  Aquatics     []  Neuromuscular     [] Vasocompression     [] Gait Training     [] Dry needling        [] 1 or 2 muscles        [] 3 or more muscles     []  Other     Total Treatment time 45 3     Time In:  345PM     Time Out: 430PM    Electronically signed by:  Anselmo Stubbs, PT

## 2022-04-11 NOTE — ED TRIAGE NOTES
Mode of arrival (squad #, walk in, police, etc) : walk-in      Chief complaint(s): N/V      Arrival Note (brief scenario, treatment PTA, etc). : C/o intermittent N/V since Friday. 2-3 episodes w/in days. Concerned he was exposed to co-worker with \"stomach bug.\" Denies fever, VSS in triage. C= \"Have you ever felt that you should Cut down on your drinking? \"  No  A= \"Have people Annoyed you by criticizing your drinking? \"  No  G= \"Have you ever felt bad or Guilty about your drinking? \"  No  E= \"Have you ever had a drink as an Eye-opener first thing in the morning to steady your nerves or to help a hangover? \"  No      Deferred []      Reason for deferring: N/A    *If yes to two or more: probable alcohol abuse. *

## 2022-04-14 ENCOUNTER — TELEPHONE (OUTPATIENT)
Dept: FAMILY MEDICINE CLINIC | Age: 47
End: 2022-04-14

## 2022-04-14 NOTE — TELEPHONE ENCOUNTER
[facility]) ED Follow up Call    Reason for ED visit:  Emesis        Hi Meghan Luna , this is SJ from Dr. Katelyn Avalos office, just calling to see how you are doing after your recent ED visit. Did you receive discharge instructions? Yes  Do you understand the discharge instructions? Yes  Did the ED give you any new prescriptions? No:   Were you able to fill your prescriptions? No:      Do you have one of our red, yellow and green  Zone sheets that help you to determine when you should go to the ED? No      Do you need or want to make a follow up appt with your PCP? No    Do you have any further needs in the home i.e. Equipment?   No        FU appts/Provider:    Future Appointments   Date Time Provider Angelina Manzo   4/19/2022 12:30 PM [facility] Connor [facility]

## 2022-05-10 DIAGNOSIS — K58.0 IRRITABLE BOWEL SYNDROME WITH DIARRHEA: ICD-10-CM

## 2022-05-10 DIAGNOSIS — J30.2 SEASONAL ALLERGIES: ICD-10-CM

## 2022-05-10 DIAGNOSIS — R73.03 PREDIABETES: ICD-10-CM

## 2022-05-10 RX ORDER — CETIRIZINE HYDROCHLORIDE 10 MG/1
TABLET ORAL
Qty: 30 TABLET | Refills: 3 | Status: SHIPPED | OUTPATIENT
Start: 2022-05-10 | End: 2022-07-18 | Stop reason: SDUPTHER

## 2022-05-10 RX ORDER — CITALOPRAM 10 MG/1
10 TABLET ORAL DAILY
Qty: 30 TABLET | Refills: 3 | Status: SHIPPED | OUTPATIENT
Start: 2022-05-10 | End: 2022-07-18 | Stop reason: ALTCHOICE

## 2022-05-10 RX ORDER — METFORMIN HYDROCHLORIDE 500 MG/1
500 TABLET, EXTENDED RELEASE ORAL
Qty: 60 TABLET | Refills: 3 | Status: SHIPPED | OUTPATIENT
Start: 2022-05-10 | End: 2022-07-18 | Stop reason: ALTCHOICE

## 2022-05-20 ENCOUNTER — TELEPHONE (OUTPATIENT)
Dept: FAMILY MEDICINE CLINIC | Age: 47
End: 2022-05-20

## 2022-05-20 NOTE — TELEPHONE ENCOUNTER
----- Message from Kath Turenr sent at 5/20/2022  3:51 PM EDT -----  Subject: Message to Provider    QUESTIONS  Information for Provider? Patient request call back. would like MRI test   results put into mychart  ---------------------------------------------------------------------------  --------------  CALL BACK INFO  What is the best way for the office to contact you? OK to leave message on   voicemail  Preferred Call Back Phone Number? [phone]  ---------------------------------------------------------------------------  --------------  SCRIPT ANSWERS  Relationship to Patient?  Self

## 2022-05-23 NOTE — TELEPHONE ENCOUNTER
MRI was done in December and patient was notified about the results. It showed disc herniation at L4 and arthritis. He was referred to pain management.

## 2022-06-09 ENCOUNTER — TELEPHONE (OUTPATIENT)
Dept: FAMILY MEDICINE CLINIC | Age: 47
End: 2022-06-09

## 2022-06-09 NOTE — TELEPHONE ENCOUNTER
Patient has appointment in a month we will discuss at that time, he missed previous appointment. Insurance will not cover more refills without seeing him.

## 2022-06-09 NOTE — TELEPHONE ENCOUNTER
----- Message from Curry Haney sent at 6/9/2022  8:25 AM EDT -----  Subject: Referral Request    QUESTIONS   Reason for referral request? Pt is needing a new referral for Physical   therapy. Pt states he goes to [facility]. Pt states he is at   his 29th day and needs a new auth sent into insurance. Pt states he would   also like to do water therapy. Please call [phone] for [facility]   scheduling office. Please advise. Has the physician seen you for this condition before? No   Preferred Specialist (if applicable)? Do you already have an appointment scheduled? No  Additional Information for Provider?   ---------------------------------------------------------------------------  --------------  CALL BACK INFO  What is the best way for the office to contact you? OK to leave message on   voicemail  Preferred Call Back Phone Number? [phone]  ---------------------------------------------------------------------------  --------------  SCRIPT ANSWERS  Relationship to Patient?  Self

## 2022-07-18 ENCOUNTER — OFFICE VISIT (OUTPATIENT)
Dept: FAMILY MEDICINE CLINIC | Age: 47
End: 2022-07-18
Payer: MEDICARE

## 2022-07-18 VITALS
BODY MASS INDEX: 27.43 KG/M2 | WEIGHT: 181 LBS | HEART RATE: 97 BPM | SYSTOLIC BLOOD PRESSURE: 142 MMHG | TEMPERATURE: 97.6 F | DIASTOLIC BLOOD PRESSURE: 92 MMHG | HEIGHT: 68 IN | OXYGEN SATURATION: 98 %

## 2022-07-18 DIAGNOSIS — I10 ESSENTIAL HYPERTENSION: ICD-10-CM

## 2022-07-18 DIAGNOSIS — J30.2 SEASONAL ALLERGIES: ICD-10-CM

## 2022-07-18 DIAGNOSIS — R73.03 PREDIABETES: ICD-10-CM

## 2022-07-18 DIAGNOSIS — F32.0 MILD SINGLE CURRENT EPISODE OF MAJOR DEPRESSIVE DISORDER (HCC): ICD-10-CM

## 2022-07-18 DIAGNOSIS — Z11.59 ENCOUNTER FOR SCREENING FOR OTHER VIRAL DISEASES: ICD-10-CM

## 2022-07-18 DIAGNOSIS — E78.2 MIXED HYPERLIPIDEMIA: ICD-10-CM

## 2022-07-18 DIAGNOSIS — M54.16 RIGHT LUMBAR RADICULITIS: Chronic | ICD-10-CM

## 2022-07-18 DIAGNOSIS — M51.36 LUMBAR DEGENERATIVE DISC DISEASE: ICD-10-CM

## 2022-07-18 DIAGNOSIS — M51.26 LUMBAR DISC HERNIATION: Primary | Chronic | ICD-10-CM

## 2022-07-18 PROCEDURE — 99214 OFFICE O/P EST MOD 30 MIN: CPT | Performed by: FAMILY MEDICINE

## 2022-07-18 RX ORDER — VENLAFAXINE HYDROCHLORIDE 37.5 MG/1
CAPSULE, EXTENDED RELEASE ORAL
COMMUNITY
Start: 2022-07-06

## 2022-07-18 RX ORDER — FLUTICASONE PROPIONATE 50 MCG
1 SPRAY, SUSPENSION (ML) NASAL DAILY
Qty: 16 G | Refills: 0 | Status: SHIPPED | OUTPATIENT
Start: 2022-07-18

## 2022-07-18 RX ORDER — EPINEPHRINE 0.3 MG/.3ML
1 INJECTION SUBCUTANEOUS PRN
Qty: 2 EACH | Refills: 0 | Status: CANCELLED | OUTPATIENT
Start: 2022-07-18

## 2022-07-18 RX ORDER — MIRTAZAPINE 15 MG/1
TABLET, FILM COATED ORAL
COMMUNITY
Start: 2022-07-05

## 2022-07-18 RX ORDER — CETIRIZINE HYDROCHLORIDE 10 MG/1
TABLET ORAL
Qty: 30 TABLET | Refills: 3 | Status: SHIPPED | OUTPATIENT
Start: 2022-07-18

## 2022-07-18 ASSESSMENT — PATIENT HEALTH QUESTIONNAIRE - PHQ9
SUM OF ALL RESPONSES TO PHQ QUESTIONS 1-9: 1
9. THOUGHTS THAT YOU WOULD BE BETTER OFF DEAD, OR OF HURTING YOURSELF: 0
SUM OF ALL RESPONSES TO PHQ QUESTIONS 1-9: 1
8. MOVING OR SPEAKING SO SLOWLY THAT OTHER PEOPLE COULD HAVE NOTICED. OR THE OPPOSITE, BEING SO FIGETY OR RESTLESS THAT YOU HAVE BEEN MOVING AROUND A LOT MORE THAN USUAL: 0
4. FEELING TIRED OR HAVING LITTLE ENERGY: 1
3. TROUBLE FALLING OR STAYING ASLEEP: 0
1. LITTLE INTEREST OR PLEASURE IN DOING THINGS: 0
SUM OF ALL RESPONSES TO PHQ QUESTIONS 1-9: 1
2. FEELING DOWN, DEPRESSED OR HOPELESS: 0
7. TROUBLE CONCENTRATING ON THINGS, SUCH AS READING THE NEWSPAPER OR WATCHING TELEVISION: 0
6. FEELING BAD ABOUT YOURSELF - OR THAT YOU ARE A FAILURE OR HAVE LET YOURSELF OR YOUR FAMILY DOWN: 0
SUM OF ALL RESPONSES TO PHQ QUESTIONS 1-9: 1
SUM OF ALL RESPONSES TO PHQ9 QUESTIONS 1 & 2: 0
5. POOR APPETITE OR OVEREATING: 0
10. IF YOU CHECKED OFF ANY PROBLEMS, HOW DIFFICULT HAVE THESE PROBLEMS MADE IT FOR YOU TO DO YOUR WORK, TAKE CARE OF THINGS AT HOME, OR GET ALONG WITH OTHER PEOPLE: 0

## 2022-07-18 ASSESSMENT — ENCOUNTER SYMPTOMS
RECTAL PAIN: 0
EYE REDNESS: 0
STRIDOR: 0
NAUSEA: 0
DIARRHEA: 0
ABDOMINAL PAIN: 0
COLOR CHANGE: 0
WHEEZING: 0
RHINORRHEA: 0
SHORTNESS OF BREATH: 0
SINUS PRESSURE: 0
VOMITING: 0
ABDOMINAL DISTENTION: 0
TROUBLE SWALLOWING: 0
COUGH: 0
BACK PAIN: 1
SORE THROAT: 0
CHEST TIGHTNESS: 0
BLOOD IN STOOL: 0
CONSTIPATION: 0

## 2022-07-18 NOTE — PROGRESS NOTES
Visit Information    Have you changed or started any medications since your last visit including any over-the-counter medicines, vitamins, or herbal medicines? yes -    Have you stopped taking any of your medications? Is so, why? -  yes -   Are you having any side effects from any of your medications? - NO    Have you seen any other physician or provider since your last visit? yes -    Have you had any other diagnostic tests since your last visit?  no   Have you been seen in the emergency room and/or had an admission in a hospital since we last saw you?  no   Have you had your routine dental cleaning in the past 6 months?  no     Do you have an active MyChart account? If no, what is the barrier?   Yes    Patient Care Team:  Aicha Devlin MD as PCP - General (Family Medicine)  Aicha Devlin MD as PCP - [facility]    Medical History Review  Past Medical, Family, and Social History reviewed and does contribute to the patient presenting condition    Health Maintenance   Topic Date Due    Colorectal Cancer Screen  09/01/2019    COVID-19 Vaccine (3 - Booster for Pfizer series) 10/14/2021    Flu vaccine (1) 09/01/2022    Depression Monitoring  09/13/2022    A1C test (Diabetic or Prediabetic)  12/13/2022    Lipids  01/16/2026    DTaP/Tdap/Td vaccine (2 - Td or Tdap) 05/31/2028    Hepatitis C screen  Completed    HIV screen  Completed    Hepatitis A vaccine  Aged Out    Hepatitis B vaccine  Aged Out    Hib vaccine  Aged Out    Meningococcal (ACWY) vaccine  Aged Out    Pneumococcal 0-64 years Vaccine  Aged Out

## 2022-07-18 NOTE — PROGRESS NOTES
BMI 27.93 kg/m²    Body mass index is 27.93 kg/m². Wt Readings from Last 3 Encounters:   07/18/22 181 lb (82.1 kg)   04/10/22 180 lb (81.6 kg)   01/13/22 174 lb (78.9 kg)        []Negative depression screening. PHQ Scores 7/18/2022 9/13/2021 2/4/2021 5/31/2018   PHQ2 Score 0 1 0 1   PHQ9 Score 1 1 0 1      []1-4 = Minimal depression   []5-9 = Milddepression   []10-14 = Moderate depression   []15-19 = Moderately severe depression   []20-27 = Severe depression    Discussed testing with the patient and all questions fully answered. Admission on 04/10/2022, Discharged on 04/10/2022   Component Date Value Ref Range Status    Specimen Description 04/10/2022 . NASOPHARYNGEAL SWAB   Final    Source 04/10/2022 . NASOPHARYNGEAL SWAB   Final    SARS-CoV-2 RNA, RT PCR 04/10/2022 Not Detected  Not Detected Final    Comment:       Testing was performed using [employer] SARS-CoV-2 and Influenza A/B nucleic acid assay. This test is a multiplex Real-Time Reverse Transcriptase Polymerase Chain Reaction   (RT-PCR)-based in vitro diagnostic test intended for the qualitative detection of nucleic   acids from SARS-CoV-2,  influenza A, and influenza B in nasopharyngeal and nasal swab specimens for use under the   FDA's Emergency Use Authorization (EUA) only. Not Detected results do not preclude SARS-CoV-2 infection and should not be used as the sole   basis for patient management decisions. Negative results must be combined with clinical observations, patient history, and   epidemiological information.         Fact sheet for Patients: FindDrives.pl  Fact sheet for Healthcare Providers: FindDrives.pl      INFLUENZA A 04/10/2022 Not Detected  Not Detected Final    INFLUENZA B 04/10/2022 Not Detected  Not Detected Final         Most recent labs reviewed:     Lab Results   Component Value Date    WBC 3.8 12/13/2021    HGB 14.2 12/13/2021    HCT 42.8 12/13/2021    MCV 90.5 12/13/2021     12/13/2021       @BRIEFLAB(NA,K,CL,CO2,BUN,CREATININE,GLUCOSE,CALCIUM)@     Lab Results   Component Value Date    ALT 21 01/16/2021    AST 19 01/16/2021    ALKPHOS 63 01/16/2021    BILITOT 0.25 (L) 01/16/2021       Lab Results   Component Value Date    TSH 1.78 08/18/2016       Lab Results   Component Value Date    CHOL 174 01/16/2021    CHOL 212 (H) 01/21/2020    CHOL 188 06/18/2018     Lab Results   Component Value Date    TRIG 47 01/16/2021    TRIG 55 01/21/2020    TRIG 75 06/18/2018     Lab Results   Component Value Date    HDL 44 01/16/2021    HDL 63 01/21/2020    HDL 59 06/18/2018     Lab Results   Component Value Date    LDLCHOLESTEROL 121 01/16/2021    LDLCHOLESTEROL 138 (H) 01/21/2020    LDLCHOLESTEROL 114 06/18/2018     Lab Results   Component Value Date    VLDL NOT REPORTED 01/16/2021    VLDL NOT REPORTED 01/21/2020    VLDL NOT REPORTED 06/18/2018     Lab Results   Component Value Date    CHOLHDLRATIO 4.0 01/16/2021    CHOLHDLRATIO 3.4 01/21/2020    CHOLHDLRATIO 3.2 06/18/2018       Lab Results   Component Value Date    LABA1C 5.1 12/13/2021       No results found for: IEEPTBGH03    No results found for: FOLATE    No results found for: IRON, TIBC, FERRITIN    Lab Results   Component Value Date    VITD25 23.8 (L) 11/26/2018             Current Outpatient Medications   Medication Sig Dispense Refill    mirtazapine (REMERON) 15 MG tablet take 1/2 tablet by mouth at bedtime      venlafaxine (EFFEXOR XR) 37.5 MG extended release capsule take 1 capsule by mouth once daily every morning      fluticasone (FLONASE) 50 MCG/ACT nasal spray 1 spray by Each Nostril route in the morning.  16 g 0    cetirizine (ZYRTEC) 10 MG tablet TAKE 1 TABLET BY MOUTH DAILY 30 tablet 3    Elastic Bandages & Supports (LUMBAR BACK BRACE/SUPPORT PAD) MISC 1 each by Does not apply route daily as needed (BACK PAIN) 1 each 0    vitamin D3 (CHOLECALCIFEROL) 25 MCG (1000 UT) TABS tablet TAKE 1 TABLET BY MOUTH DAILY 90 tablet 1    losartan (COZAAR) 50 MG tablet TAKE 1 & 1/2 TABLETS BY MOUTH DAILY 90 tablet 1    pregabalin (LYRICA) 25 MG capsule Take 1 capsule by mouth 2 times daily for 30 days. 60 capsule 0    clotrimazole-betamethasone (LOTRISONE) 1-0.05 % cream Apply topically 2 times daily. 45 g 0    EPINEPHrine (EPIPEN) 0.3 MG/0.3ML SOAJ injection Inject 1 mL into the muscle as needed (Anaphylaxis) Use as directed for allergic reaction 2 each 0    Elastic Bandages & Supports (KNEE BRACE/HINGED/LARGE) MISC Use daily for knee pain 1 each 0    Blood Pressure KIT Dx: HTN. Needs automatic blood pressure machine to monitor his blood pressure. 1 kit 0    Ciclopirox (LOPROX) 0.77 % gel Apply in between toes twice daily. 1 Tube 3    Multiple Vitamin (MULTIVITAMIN ADULT PO) Take by mouth daily (Patient not taking: Reported on 7/18/2022)      ibuprofen (ADVIL;MOTRIN) 600 MG tablet Take 1 tablet by mouth every 6 hours as needed for Pain (Patient not taking: Reported on 7/18/2022) 20 tablet 0     No current facility-administered medications for this visit. Social History     Socioeconomic History    Marital status:      Spouse name: Not on file    Number of children: Not on file    Years of education: Not on file    Highest education level: Not on file   Occupational History    Not on file   Tobacco Use    Smoking status: Former     Types: Cigars     Quit date: 11/16/2018     Years since quitting: 3.6    Smokeless tobacco: Never    Tobacco comments:     quit 2016   Substance and Sexual Activity    Alcohol use:  Yes     Alcohol/week: 0.0 standard drinks     Comment: beer once week    Drug use: Yes     Types: Marijuana Berneta Mateo)    Sexual activity: Not on file   Other Topics Concern    Not on file   Social History Narrative    Not on file     Social Determinants of Health     Financial Resource Strain: Low Risk     Difficulty of Paying Living Expenses: Not hard at all   Food Insecurity: No Food Insecurity    Worried About Running Out of Food in the Last Year: Never true    Ran Out of Food in the Last Year: Never true   Transportation Needs: No Transportation Needs    Lack of Transportation (Medical): No    Lack of Transportation (Non-Medical): No   Physical Activity: Not on file   Stress: Not on file   Social Connections: Not on file   Intimate Partner Violence: Not on file   Housing Stability: Unknown    Unable to Pay for Housing in the Last Year: No    Number of Places Lived in the Last Year: Not on file    Unstable Housing in the Last Year: No     Counseling given: Not Answered  Tobacco comments: quit 2016        Family History   Problem Relation Age of Onset    Hypertension Mother     Diabetes Mother              -rest of complaints with corresponding details per ROS    The patient's past medical, surgical, social, and family history as well as his current medications and allergies were reviewed as documented intoday's encounter. Review of Systems   Constitutional:  Positive for activity change and unexpected weight change. Negative for appetite change, fatigue and fever. HENT:  Negative for congestion, ear pain, postnasal drip, rhinorrhea, sinus pressure, sore throat and trouble swallowing. Eyes:  Negative for redness and visual disturbance. Respiratory:  Negative for cough, chest tightness, shortness of breath, wheezing and stridor. Cardiovascular:  Negative for chest pain, palpitations and leg swelling. Gastrointestinal:  Negative for abdominal distention, abdominal pain, blood in stool, constipation, diarrhea, nausea, rectal pain and vomiting. Endocrine: Negative for polydipsia, polyphagia and polyuria. Genitourinary:  Negative for difficulty urinating, flank pain, frequency and urgency. Musculoskeletal:  Positive for arthralgias, back pain and gait problem. Negative for myalgias and neck pain. Skin:  Negative for color change, rash and wound.    Allergic/Immunologic: Negative for food allergies and immunocompromised state. Neurological:  Positive for weakness and numbness. Negative for dizziness, speech difficulty, light-headedness and headaches. Psychiatric/Behavioral:  Positive for decreased concentration. Negative for agitation, behavioral problems, dysphoric mood, hallucinations, sleep disturbance and suicidal ideas. The patient is nervous/anxious. Physical Exam  Vitals and nursing note reviewed. Constitutional:       General: He is not in acute distress. Appearance: Normal appearance. He is well-developed. He is obese. He is not diaphoretic. HENT:      Head: Normocephalic and atraumatic. Nose: Nose normal.   Eyes:      General:         Right eye: No discharge. Left eye: No discharge. Extraocular Movements: Extraocular movements intact. Conjunctiva/sclera: Conjunctivae normal.      Pupils: Pupils are equal, round, and reactive to light. Neck:      Thyroid: No thyromegaly. Cardiovascular:      Rate and Rhythm: Normal rate and regular rhythm. Heart sounds: Normal heart sounds. No murmur heard. Pulmonary:      Effort: Pulmonary effort is normal. No respiratory distress. Breath sounds: Normal breath sounds. No wheezing or rhonchi. Abdominal:      General: Bowel sounds are normal. There is no distension. Palpations: Abdomen is soft. There is no mass. Tenderness: There is no abdominal tenderness. There is no right CVA tenderness, left CVA tenderness, guarding or rebound. Musculoskeletal:      Cervical back: Normal range of motion and neck supple. Spasms present. No rigidity. Thoracic back: Spasms present. Normal range of motion. Lumbar back: Spasms and tenderness present. No bony tenderness. Decreased range of motion. Lymphadenopathy:      Cervical: No cervical adenopathy. Skin:     Coloration: Skin is not jaundiced or pale. Findings: No bruising, erythema or rash. Neurological:      General: No focal deficit present. Mental Status: He is alert and oriented to person, place, and time. Cranial Nerves: No cranial nerve deficit. Sensory: No sensory deficit. Motor: No weakness or tremor. Coordination: Coordination normal.      Gait: Gait and tandem walk normal.      Deep Tendon Reflexes: Reflexes are normal and symmetric. Psychiatric:         Attention and Perception: Attention and perception normal. He is attentive. Mood and Affect: Mood is anxious. Mood is not depressed. Affect is not tearful. Speech: He is communicative. Speech is not rapid and pressured, delayed or slurred. Behavior: Behavior normal. Behavior is not agitated or slowed. Thought Content: Thought content normal.         Judgment: Judgment normal.           ASSESSMENT AND PLAN      1. Lumbar disc herniation  Uncontrolled, follow-up with pain management restart physical therapy, check blood work  - Vitamin D 25 Hydroxy; Future  - Vitamin B12 & Folate; Future  - TSH with Reflex; Future  - [facility] [place]  - Elastic Bandages & Supports (LUMBAR BACK BRACE/SUPPORT PAD) MISC; 1 each by Does not apply route daily as needed (BACK PAIN)  Dispense: 1 each; Refill: 0    2. Lumbar degenerative disc disease  Continue medical follow-up with pain management back brace ordered  - [facility] Physical [facility] - [address] [place] [address] (LUMBAR BACK BRACE/SUPPORT PAD) MISC; 1 each by Does not apply route daily as needed (BACK PAIN)  Dispense: 1 each; Refill: 0    3. Right lumbar radiculitis  Follow-up with pain management continue Lyrica continue NSAIDs as needed continue    4. Mixed hyperlipidemia  Continue lifestyle  Changes recheck lipid panel  - Lipid Panel; Future    5.  Essential hypertension  Fairly controlled try low-salt diet handout provided, monitor blood pressure at home nurse visit in 1 week for BP check, continue losartan 50 mg if still high can increase to 100.    6. completed       Tom Loredo received counseling on the following healthy behaviors: nutrition, exercise, and medication adherence  Reviewed prior labs and health maintenance  Continue current medications, diet and exercise. Discussed use, benefit, and side effects of prescribed medications. Barriers to medication compliance addressed. Patient given educational materials - see patient instructions  Was a self-tracking handout given in paper form or via [employer]? Yes    Requested Prescriptions     Signed Prescriptions Disp Refills    fluticasone (FLONASE) 50 MCG/ACT nasal spray 16 g 0     Si spray by Each Nostril route in the morning. cetirizine (ZYRTEC) 10 MG tablet 30 tablet 3     Sig: TAKE 1 TABLET BY MOUTH DAILY    Elastic Bandages & Supports (LUMBAR BACK BRACE/SUPPORT PAD) MISC 1 each 0     Si each by Does not apply route daily as needed (BACK PAIN)       All patient questions answered. Patient voiced understanding. Quality Measures    Body mass index is 27.93 kg/m². Elevated. Weight control planned discussed daily exercise regimen and Healthy diet and regular exercise. BP: (!) 142/92 Blood pressure is high. Treatment plan consists of Weight Reduction, DASH Eating Plan, Dietary Sodium Restriction, Increased Physical Activity, and No treatment change needed. Lab Results   Component Value Date    LDLCHOLESTEROL 121 2021    (goal LDL reduction with dx if diabetes is 50% LDL reduction)      PHQ Scores 2022   PHQ2 Score 0 1 0 1   PHQ9 Score 1 1 0 1     Interpretation of Total Score Depression Severity: 1-4 = Minimal depression, 5-9 = Mild depression, 10-14 = Moderate depression, 15-19 = Moderately severe depression, 20-27 = Severe depression    The patient'spast medical, surgical, social, and family history as well as his   current medications and allergies were reviewed as documented in today's encounter.       Medications, labs, diagnostic studies, consultations andfollow-up as documented in this encounter. Return in about 3 months (around 10/18/2022) for always chronic conditons. Patient wasseen with total face to face time of 35 minutes. More than 50% of this visit was counseling and education. Future Appointments   Date Time Provider Angelina Manzo   7/27/2022  3:45 PM SCHEDULE, [facility] KIM JACKSON  YOVANI [facility]   10/18/2022  3:00 PM Rosilyn Mortimer, MD [facility]     This note was completed by using the assistance of a speech-recognition program. However, inadvertent computerized transcription errors may be present. Althoughevery effort was made to ensure accuracy, no guarantees can be provided that every mistake has been identified and corrected by editing.   Electronically signed by Rosilyn Mortimer, MD on 7/18/2022  8:03 PM

## 2022-07-25 ENCOUNTER — HOSPITAL ENCOUNTER (OUTPATIENT)
Dept: PHYSICAL THERAPY | Age: 47
Setting detail: THERAPIES SERIES
Discharge: HOME | End: 2022-07-25
Payer: MEDICARE

## 2022-07-25 PROCEDURE — 97110 THERAPEUTIC EXERCISES: CPT

## 2022-07-25 PROCEDURE — 97162 PT EVAL MOD COMPLEX 30 MIN: CPT

## 2022-07-25 NOTE — PROGRESS NOTES
[address] [facility]  [address] [facility]         Phone: [phone]       Fax: [phone]     Physical Therapy Spine Evaluation    Date:  2022  Patient: Amanda Valentino  : 1975  MRN: [id number]  Physician:  Janelle Chavez MD    Insurance: [employer],   # of visits allowed/remainin, Auth: NPRe (PATIENT USED 4 SESSION AT [facility] [place] )  Medical Diagnosis:    M51.26 (ICD-10-CM) - Lumbar disc herniation   M51.36 (ICD-10-CM) - Lumbar degenerative disc disease   Rehab Codes: M25.60 -BACK STIFFNESS, M54.50 -LBP, M62.830 -BACK SPASMS,   Onset Date: 2021  Next 's appt.: 10/18/2022      Subjective:   CC: PATIENT REPORTS MID AND LOW BACK PAIN THAT IS INCREASED BY BENDING OR TWISTING HIS BACK. HE REPORTS HIS PAIN IS DECREASED BY SITTING WITH GOOD POSTURE AND SUPINE WITH HOB ELEVATED. HE HAS BEEN UNABLE TO PERFORM HIS DUTIES AT THE [facility] FOR THE LAST ONE MONTH. HE HAS A PART TIME CLEANING JOB AND HAS TO MODIFY HIS ACTIVITY DUE TO HIS LIMITED ABILITY TO BEND. HE NOTES CONSTANT BACK TIGHTNESS. HPI: PATIENT REPORTS THIS PROBLEM STARTED 2021. 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 HE WAS SEEN IN ED, IMAGING WAS NEGATIVE. HE FOLLOWED UP WITH PCP, REFERRED FOR MRI WHICH SHOWED LUMBAR HNP. HE ATTENDED THERAPY AT [address] [place] [address] St ATTENDED 3-4 SESSIONS. HE STOPPED GOING TO [facility] BECAUSE HE THINKS HE WOULD GET MORE RELIEF FROM HIS BACK PAIN IN THE POOL.     PMHx: [] Unremarkable [] Diabetes [x] HTN  [] Pacemaker   [] MI/Heart Problems [] Cancer [] Arthritis [x] Other: R TIBIA FRACTURE  Past medical history of Allergic rhinitis, Anxiety, Cervical spine pain, Gastric reflux, Headache(784.0), Hepatitis, Hyperlipidemia, Hypertension, IBS (irritable bowel syndrome), Insomnia, Iron deficiency anemia, Lumbar degenerative disc disease, Mild single current episode of major WITH SUPPORT  Worse: [] AM    [] PM    [] Sit    [] Rise/Sit    []Stand    [] Walk    [] Lying    [x] Bend                             [] Valsalva    [x] Other: TWISTING  Sleep: [] OK    [x] Disturbed:    Objective:   STRENGTH  ROM    Left Right Cervical    L1-2 Hip Flex 5 5 Flexion    Hip Abd 4 4 Extension    L3-4 Knee Ext 5 5 Rotation L R   L4 Ankle DF 5 5 Sidebend L R   L5 EHL 5 5 Retraction    S1 Plant. Flex   Lumbar    Abdominals   Flexion 25-50% OF NORM   Erector Spinae   Extension 50%      Rotation L  50-75% R 50-75%      Sidebend L 25% R 25%      AROM LE WNL WNL         TESTS (+/-) LEFT RIGHT Not Tested   SLR [x] sit [x] supine -     + -    + []   Hamstring (SLR) 45* 50* []   SKTC - - []   DKTC -  []   Slump/Dural -  []   SI JT -  []   TAMARA -  []   Joint Mobility   []   Cerv. Comp   []   Cerv. Distraction   []   Cerv. Alar/Transverse   []   Vertebral Artery   []   Adsons   []   Ziyad Uriarte   []   Carmita Tests ? Pain ?  Pain No Change Not Tested   RFIS [x] [] [] []   TERESA [x] [] [] []   RFIL [] [] [x] []   REIL [] [] [x] []   Rep Prot [] [] [] []   Rep Retract [] [] [] []       OBSERVATION No Deficit Deficit Not Tested Comments   Posture       Forward Head [] [x] []    Rounded Shoulders [] [x] []    Kyphosis [] [x] [] DECREASED   Lordosis [] [x] [] DECREASED   Lateral Shift [x] [] []    Scoliosis [x] [] []    Iliac Crest [x] [] []    PSIS [x] [] []    ASIS [] [] [x]    Genu Valgus [x] [] []    Genu Varus [x] [] []    Genu Recurvatum [x] [] []    Pronation [x] [] []    Supination [x] [] []    Leg Length Discrp [] [x] []    Slumped Sitting [] [] []    Palpation [] [] []    Sensation [x] [] [] DENIES NUMBNESS   Edema [x] [] []    Neurological [] [] [x]                FUNCTION Normal Difficult Unable   Sitting [x] [] []   Standing [] [x] []   Ambulation [] [x] []   Groom/Dress [x] [] []   Lift/Carry [] [x] []   Stairs [] [x] []   Bending [] [] [x]   OH reach [] [x] []   Sit to Stand [x] [] []     Functional Assessment Used: OSWESTRY  Current Status Score: 19/50 = 38% DISABILITY      Assessment:   IMAGING INDICATED GRADE 1 RETROLISTHESIS WITH MID FORAMINAL NARROWING (L4-5 AND L5-S1). PATIENT'S SYMPTOMS ARE MORE MID BACK. THIS MAY BE DUE TO THE LIMITATIONS IN HIS LUMBAR SPINE MOBILITY. Patient would continue to benefit from skilled physical therapy services in order to: [facility]. Problems:    [x] ? Back Pain:    [] ? Cervical Pain:    [x] ? ROM:     [x] ? Strength:   [x] ? Function:  [x] Postural Deviations  [] Gait Deviations  [] Other:    STG: (to be met in  12 treatments)  ? Pain: NO GREATER THAN 5/10 PERFORMING BENDING AT WORK  ? ROM: 50-75% NORM TRUNK MOTION FOR IMPROVED BENDING  ? Strength: BEGIN CORE STRENGTHENING PROGRAM  ? Function: IMPROVE OSWESTRY (19/50) BY 6  Independent with Home Exercise Programs    LTG: (to be met in TBD treatments)      Patient goals: RELIEF FROM BACK ACHE, LEARN STRENGTHENING EXERCISES      Evaluation Complexity:  History (Personal factors, comorbidities) [] 0 [x] 1-2 [] 3+   Exam (limitations, restrictions) [x] 1-2 [] 3 [] 4+   Clinical presentation (progression) [] Stable [x] Evolving  [] Unstable   Decision Making [] Low [x] Moderate [] High    [] Low Complexity [x] Moderate Complexity [] High Complexity       Rehab Potential:  [x] Good  [] Fair  [] Poor   Suggested Professional Referral:  [x] No  [] Yes:  Barriers to Goal Achievement[de-identified]  [x] No  [] Yes:  Domestic Concerns:  [x] No  [] Yes:    Pt. Education:  [x] Plans/Goals, Risks/Benefits discussed  [] Home exercise program  Method of Education: [x] Verbal  [x] Demo  [] Written  Comprehension of Education:  [x] Verbalizes understanding. [x] Demonstrates understanding. [] Needs Review. [x] Demonstrates/verbalizes understanding of HEP/Ed previously given.     Treatment Plan:  [x] Therapeutic Exercise   17416  [] Iontophoresis: 4 mg/mL Dexamethasone Sodium Phosphate  mAmin  [id number]   [] Therapeutic Activity  13171 [] Vasopneumatic cold with compression  64157    [] Gait Training   [phone] [] Ultrasound   [phone]   [] Neuromuscular Re-education  [id number] [] Electrical Stimulation Unattended  08702   [x] Manual Therapy  81623 [] Electrical Stimulation Attended  [id number]   [x] Instruction in HEP  [] Lumbar/Cervical Traction  [id number]   [x] Aquatic Therapy   20441 [] Cold/hotpack    [] Massage   22387      [] Dry Needling, 1 or 2 muscles  62961       Frequency:  2 x/week for 12 visits    Todays Treatment:  Modalities:   Precautions: NONE IDENTIFIED  Exercises: REVIEWED HEP  Exercise Reps/ Time Weight/ Level Comments   [employer]      CORNER STRETCH      PIRIFORMIS STRETCH      T BAND ROW AND PULL DOWNS            Other:    Specific Instructions for next treatment: BEGIN AQUATIC PROGRAM FOR IMPROVED TRUNK ROM/ LE FLEXIBILITY AND CORE STRENGTHENING. PROGRESS AS ABLE TO GYM AS PATIENT HAS AN ACTIVE LIFESTYLE AND WOULD BENEFIT FROM MORE INTENSE STRENGTHENING. HE WAS ONLY SCHEDULED FOR 2.5 WEEKS IN THE POOL IN HOPE AFTER HE WOULD BE ABLE TO PROGRESS TO GYM PROGRAM.      Treatment Charges: Mins Units   [x] Evaluation       [x]  Low       []  Moderate       []  High 45 1   []  Modalities     [x]  Ther Exercise 10 1   []  Manual Therapy     []  Ther Activities     []  Aquatics     []  Neuromuscular     []  Gait Training     []  Dry Needling           1-2 muscles     []  Dry Needling           3 or more muscles     TOTAL 55 2       Time in: 8706      Time out: 7767    Electronically signed by:  Julissa Sheets, PT

## 2022-07-27 ENCOUNTER — HOSPITAL ENCOUNTER (OUTPATIENT)
Dept: PHYSICAL THERAPY | Age: 47
Setting detail: THERAPIES SERIES
Discharge: HOME | End: 2022-07-27
Payer: MEDICARE

## 2022-07-27 ENCOUNTER — NURSE ONLY (OUTPATIENT)
Dept: FAMILY MEDICINE CLINIC | Age: 47
End: 2022-07-27
Payer: MEDICARE

## 2022-07-27 ENCOUNTER — HOSPITAL ENCOUNTER (OUTPATIENT)
Age: 47
Discharge: HOME | End: 2022-07-27
Payer: MEDICARE

## 2022-07-27 VITALS
SYSTOLIC BLOOD PRESSURE: 138 MMHG | BODY MASS INDEX: 27.43 KG/M2 | HEIGHT: 68 IN | WEIGHT: 181 LBS | DIASTOLIC BLOOD PRESSURE: 80 MMHG

## 2022-07-27 DIAGNOSIS — E78.2 MIXED HYPERLIPIDEMIA: ICD-10-CM

## 2022-07-27 DIAGNOSIS — R73.03 PREDIABETES: ICD-10-CM

## 2022-07-27 DIAGNOSIS — I10 ESSENTIAL HYPERTENSION: Primary | ICD-10-CM

## 2022-07-27 DIAGNOSIS — M51.26 LUMBAR DISC HERNIATION: Chronic | ICD-10-CM

## 2022-07-27 DIAGNOSIS — Z11.59 ENCOUNTER FOR SCREENING FOR OTHER VIRAL DISEASES: ICD-10-CM

## 2022-07-27 LAB
CHOLESTEROL/HDL RATIO: 3.5
CHOLESTEROL: 210 MG/DL
ESTIMATED AVERAGE GLUCOSE: 120 MG/DL
FOLATE: 18.6 NG/ML
HBA1C MFR BLD: 5.8 % (ref 4–6)
HDLC SERPL-MCNC: 60 MG/DL
HIV AG/AB: NONREACTIVE
LDL CHOLESTEROL: 140 MG/DL (ref 0–130)
TRIGL SERPL-MCNC: 48 MG/DL
TSH SERPL DL<=0.05 MIU/L-ACNC: 1.09 UIU/ML (ref 0.3–5)
VITAMIN B-12: 1483 PG/ML (ref 232–1245)
VITAMIN D 25-HYDROXY: 45.7 NG/ML

## 2022-07-27 PROCEDURE — 84443 ASSAY THYROID STIM HORMONE: CPT

## 2022-07-27 PROCEDURE — 82746 ASSAY OF FOLIC ACID SERUM: CPT

## 2022-07-27 PROCEDURE — 36415 COLL VENOUS BLD VENIPUNCTURE: CPT

## 2022-07-27 PROCEDURE — 83036 HEMOGLOBIN GLYCOSYLATED A1C: CPT

## 2022-07-27 PROCEDURE — 97113 AQUATIC THERAPY/EXERCISES: CPT

## 2022-07-27 PROCEDURE — 80061 LIPID PANEL: CPT

## 2022-07-27 PROCEDURE — 87389 HIV-1 AG W/HIV-1&-2 AB AG IA: CPT

## 2022-07-27 PROCEDURE — 82607 VITAMIN B-12: CPT

## 2022-07-27 PROCEDURE — 82306 VITAMIN D 25 HYDROXY: CPT

## 2022-07-27 PROCEDURE — 99211 OFF/OP EST MAY X REQ PHY/QHP: CPT | Performed by: FAMILY MEDICINE

## 2022-07-27 NOTE — PROGRESS NOTES
Chief Complaint   Patient presents with    Blood Pressure Check        Patient is here for blood pressure recheck. 1. Essential hypertension        Well controlled BP   Continue current treatment.        BP Readings from Last 3 Encounters:   07/27/22 138/80   07/18/22 (!) 142/92   04/10/22 (!) 140/90       Pulse Readings from Last 3 Encounters:   07/18/22 97   04/10/22 74   01/13/22 72       Future Appointments   Date Time Provider Department Center   8/1/2022  1:30 PM Curtistine Cast, PTA [facility]   8/4/2022  2:00 PM Curtistine Cast, [place] [facility]   8/8/2022  1:00 PM Curtistine Cast, PTA [facility]   8/10/2022  1:30 PM Curtistine Cast, PTA [facility]   10/18/2022  3:00 PM Anjel Sebastian MD [id number]

## 2022-07-27 NOTE — FLOWSHEET NOTE
[facility]   [address]   Phone: [phone]   Fax: [phone]    Physical Therapy Daily Treatment Note      Date:  2022  Patient Name:  Sagrario Castro    :  1975  MRN: [id number]  Physician:      Chase Montenegro MD                Insurance: [place] [employer]age,   # of visits allowed/remainin, Auth: NPRe (PATIENT USED 4 SESSION AT PT LINK IN )  Medical Diagnosis:     M51.26 (ICD-10-CM) - Lumbar disc herniation   M51.36 (ICD-10-CM) - Lumbar degenerative disc disease   Rehab Codes: M25.60 -BACK STIFFNESS, M54.50 -LBP, M62.830 -BACK SPASMS,   Onset Date: 2021                      Next 's appt.: 10/18/2022    Visit# / total visits: 2/12 Cancels/No Shows: 0/0    Subjective:  Patient reports taking pain meds prior to therapy this date. Reports minor migraine with lightheadedness occasionally- states he had to fast today so may have triggered it. 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 Reports he did some brick and concrete yesterday- noted increased pain/soreness but tries to stay active. Pain:  [x] Yes  [] No Location: Mid to lower back; denies radicular symptoms Pain Rating: (0-10 scale) 4-5/10  Pain altered Tx:  [x] No  [] Yes  Action:    Comments: Initiated aquatic therapy with emphasis on postural awareness and activation of core during exercise. Patient educated on the benefits of aquatics and encouraged to avoid increasing pain    Objective:  Modalities:   Precautions:  Exercises:    [address] Exercise Log  Aquatic, Hip & DLS Program- Phase 1    Date of Eval:                                Primary PT: Letitia Johnson PT  Diagnosis:   Things to Focus On (goals):   Surgical Precautions:  Medical Precautions:  [] C-9 dates  [] Occ Med   [] Medicare       Date 22       Visit # review. [] Demonstrates/verbalizes HEP/Ed previously given. Plan: [x] Continue per plan of care.    [] Other:      Treatment Charges: Mins Units   []  Modalities     []  Ther Exercise     []  Manual Therapy     []  Ther Activities     [x]  Aquatics 39 3   []  Neuromuscular     [] Vasocompression     [] Gait Training     [] Dry needling        [] 1 or 2 muscles        [] 3 or more muscles     []  Other     Total Treatment time 39 3     Time In:158 PM            Time Out: 242 PM    Electronically signed by:  Luis Cardona PTA

## 2022-07-28 NOTE — RESULT ENCOUNTER NOTE
Please notify patient: Mild prediabetes, worsening, low-carb low-fat diet advised, stop drinking any pop if he drinks any  Worsening cholesterol, he would benefit from starting Lipitor to improve his cholesterol and increased risk of heart attacks and strokes, let me know if he agrees, I will send, what pharmacy? Otherwise labs within normal limits  continue current treatment  Low carb, low fat diet, increase fruits and vegetables, and exercise 4-5 times a week 30-40 minutes a day, or walk 1-2 hours per day, or wear a pedometer and get at least 10,000 steps per day.     Future Appointments  8/1/2022   1:30 PM    [facility] Te[facility]         [place]  8/4/2022   2:00 PM    Aimee Teixeira PT  8/8/2022   1:00 PM    [facility] PT         [place]  8/10/2022  1:30 PM    Cindi Yung PTA        [address] [place] [address]  10/18/2022 3:00 PM    Aylin Crockett MD          [facility]

## 2022-07-28 NOTE — RESULT ENCOUNTER NOTE
Noted  Future Appointments  8/1/2022   1:30 PM    Lenox Erm, PTA        [facility]         [facility]  8/4/2022   2:00 PM    Dami Erm, [place]        [facility]         [facility]  8/8/2022   1:00 PM    Lenox Erm, [place]        [facility] PT         [facility]  8/10/2022  1:30 PM    Lenox Erm, PTA        [address] [place] [address]  10/18/2022 3:00 PM    Chase Montenegro MD          [facility]

## 2022-08-01 ENCOUNTER — HOSPITAL ENCOUNTER (OUTPATIENT)
Dept: PHYSICAL THERAPY | Age: 47
Setting detail: THERAPIES SERIES
Discharge: HOME | End: 2022-08-01
Payer: MEDICARE

## 2022-08-01 PROCEDURE — 97113 AQUATIC THERAPY/EXERCISES: CPT

## 2022-08-01 NOTE — FLOWSHEET NOTE
[facility]   [address]   Phone: [phone]   Fax: [phone]    Physical Therapy Daily Treatment Note      Date:  2022  Patient Name:  Yara Hassan    :  1975  MRN: [id number]  Physician:      Becka Zavala MD                Insurance: [place] [employer],   # of visits allowed/remainin, Auth: NPRe (PATIENT USED 4 SESSION AT PT LINK IN )  Medical Diagnosis:     M51.26 (ICD-10-CM) - Lumbar disc herniation   M51.36 (ICD-10-CM) - Lumbar degenerative disc disease   Rehab Codes: M25.60 -BACK STIFFNESS, M54.50 -LBP, M62.830 -BACK SPASMS,   Onset Date: 2021                      Next 's appt.: 10/18/2022    Visit# / total visits: 3 Cancels/No Shows: 0/0    Subjective:  denies issues after last visit- states 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 Has return to work date of 22- would like to start a couple weeks of land therapy before that as he plans to resume a gym membership- states he has not been to a gym since . Pain:  [x] Yes  [] No Location: Mid to lower back; denies radicular symptoms Pain Rating: (0-10 scale) 4/10  Pain altered Tx:  [x] No  [] Yes  Action:    Comments: Emphasis on postural awareness and activation of core during exercise. Patient 13' late this date    Objective:  Modalities:   Precautions:  Exercises:    [address] [facility] Exercise Log  Aquatic, Hip & DLS Program- Phase 1    Date of Eval:                                Primary PT: Letitia Johnson PT  Diagnosis:   Things to Focus On (goals):   Surgical Precautions:  Medical Precautions:  [] C-9 dates  [] Occ Med   [] Medicare       Date 22      Visit # 2/12 3/12      Walk F/L/R 2 Laps  2 Laps      Marching 10x 2 Laps         Low Box     Squats 10x5\" 15x5\"      Step-Ups F/L        Step Down F/L   Add     Heel-toe raises 10x 15x      SLR F/L/R 10x 15x      Hip/Knee Flex/Ext  15x F/L Lunges                Kickboard Ex. Med Large      Iso Abd. 10x5\" 10x5\"      Push-pull 10x 10x      Paddling  10x              UE Format: Chest Deep Chest Deep      Horiz Abd/Add 10x 15x      IR/ER (wipers)  15x      Alt Flex/Ext 10x 15x      Alt Press Down  15x      Abd/Add 10x 15x              Deep Water: 1 Noodle 1 Noodle      Hang 5'       Cycling  2'      Jacks  1'      X-Country  1'              Balance        SLS                Stretches        Achllies 2x30\" -      Hamstring 2x30\" 2x30\"              Breast Stroke 2 Laps 2 Laps Seated Noodle     Pain Rating 4-5 4          Specific Instructions for next treatment: Add step downs and progress WB with LE exercise to challenge core stability    Assessment: [x] Progressing toward goals. Completed program as listed above with emphasis on posture and proper technique. Added short lever UE format exercise; encouraged increased speed with all activity while maintaining activation of core muscles. Also, added deep water aerobic exercise. Patient denies increased pain throughout program.    [] No change. [] Other:    Patient would continue to benefit from skilled physical therapy services in order to: [facility]. STG: (to be met in  12 treatments)  ? Pain: NO GREATER THAN 5/10 PERFORMING BENDING AT WORK  ? ROM: 50-75% NORM TRUNK MOTION FOR IMPROVED BENDING  ? Strength: BEGIN CORE STRENGTHENING PROGRAM  ? Function: IMPROVE OSWESTRY (19/50) BY 6  Independent with Home Exercise Programs        Patient goals: RELIEF FROM BACK ACHE, LEARN STRENGTHENING EXERCISES    Pt. Education:  [x] Yes  [] No  [x] Reviewed Prior HEP/Ed  Method of Education: [x] Verbal  [x] Demo  [] Written  Comprehension of Education:  [x] Verbalizes understanding. [x] Demonstrates understanding. [] Needs review. [] Demonstrates/verbalizes HEP/Ed previously given. Plan: [x] Continue per plan of care.    [] Other:      Treatment Charges: Mins Units   []  Modalities     []  Ther Exercise     []  Manual Therapy     []  Ther Activities     [x]  Aquatics 46 3   []  Neuromuscular     [] Vasocompression     [] Gait Training     [] Dry needling        [] 1 or 2 muscles        [] 3 or more muscles     []  Other     Total Treatment time 46 3     Time In: 145 PM            Time Out: 231 PM    Electronically signed by:  Erika Dudley PTA

## 2022-08-04 ENCOUNTER — HOSPITAL ENCOUNTER (OUTPATIENT)
Dept: PHYSICAL THERAPY | Age: 47
Setting detail: THERAPIES SERIES
Discharge: HOME | End: 2022-08-04
Payer: MEDICARE

## 2022-08-04 PROCEDURE — 97113 AQUATIC THERAPY/EXERCISES: CPT

## 2022-08-04 NOTE — FLOWSHEET NOTE
[facility]   [address] [place] [address] [address]   Phone: [phone]   Fax: [phone]    Physical Therapy Daily Treatment Note      Date:  2022  Patient Name:  Amarliys Guo    :  1975  MRN: [id number]  Physician:      Ye Sung MD                Insurance: [place] [employer]age,   # of visits allowed/remainin, Auth: NPRe (PATIENT USED 4 SESSION AT PT LINK IN )  Medical Diagnosis:     M51.26 (ICD-10-CM) - Lumbar disc herniation   M51.36 (ICD-10-CM) - Lumbar degenerative disc disease   Rehab Codes: M25.60 -BACK STIFFNESS, M54.50 -LBP, M62.830 -BACK SPASMS,   Onset Date: 2021                      Next 's appt.: 10/18/2022    Visit# / total visits:  Cancels/No Shows: 0/0    Subjective:  denies issues after last visit- remains sore overall with day to day activities. Pain:  [x] Yes  [] No Location: Mid to lower back; denies radicular symptoms Pain Rating: (0-10 scale) 4/10  Pain altered Tx:  [x] No  [] Yes  Action:    Comments: Emphasis on postural awareness and activation of core during exercise. Patient 13' late this date    Objective:  Modalities:   Precautions:  Exercises:    [facility] Exercise Log  Aquatic, Hip & DLS Program- Phase 1    Date of Eval:                                Primary PT: Letitia Johnson PT  Diagnosis: Things to Focus On (goals):   Surgical Precautions:  Medical Precautions:  [] C-9 dates  [] Occ Med   [] Medicare       Date 22     Visit # 2/12 3/12 4/12     Walk F/L/R 2 Laps  2 Laps 2 Laps     Marching 10x 2 Laps 2 Laps        Low Box     Squats 10x5\" 15x5\" 10x5\"     Step-Ups F/L        Step Down F/L   Low Lat 10x     Heel-toe raises 10x 15x 10x     SLR F/L/R 10x 15x 10x     Hip/Knee Flex/Ext  15x 10x     F/L Lunges    Add            Kickboard Ex.  Med Large Large     Iso Abd. 10x5\" 10x5\" 10x5\"     Push-pull 10x 10x 15x     Paddling  10x 15x             UE Format: Chest Deep Chest Deep Chest Deep     Horiz Abd/Add 10x 15x 15x     IR/ER (wipers)  15x 15x     Alt Flex/Ext 10x 15x 15x     Alt Press Down  15x 15x     Abd/Add 10x 15x 15x             Deep Water: 1 Noodle 1 Noodle 1 Noodle     Hang 5'       Cycling  2' 2'     Jacks  1' 2'     X-Country  1' 2'             Balance        SLS                Stretches        Achllies 2x30\" - 2x30\"     Hamstring 2x30\" 2x30\" 2x30\"             Breast Stroke 2 Laps 2 Laps Seated Noodle  1/2 Lap Fwd/bkwd     Pain Rating 4-5 4 4         Specific Instructions for next treatment: Increase speed; add lunges    Assessment: [x] Progressing toward goals. Progressed LE exercise to step level for increased WB and challenge for trunk stability. Also encouraged increased speed throughout program for increased resistance. Attempted seated balance on noodle with breast stroke laps to challenge core stability- difficult for patient to maintain seated balance. Good effort noted without increased pain complaints. [] No change. [] Other:    Patient would continue to benefit from skilled physical therapy services in order to: [facility]. STG: (to be met in  12 treatments)  ? Pain: NO GREATER THAN 5/10 PERFORMING BENDING AT WORK  ? ROM: 50-75% NORM TRUNK MOTION FOR IMPROVED BENDING  ? Strength: BEGIN CORE STRENGTHENING PROGRAM  ? Function: IMPROVE OSWESTRY (19/50) BY 6  Independent with Home Exercise Programs        Patient goals: RELIEF FROM BACK ACHE, LEARN STRENGTHENING EXERCISES    Pt. Education:  [x] Yes  [] No  [x] Reviewed Prior HEP/Ed  Method of Education: [x] Verbal  [x] Demo  [] Written  Comprehension of Education:  [x] Verbalizes understanding. [x] Demonstrates understanding. [] Needs review. [] Demonstrates/verbalizes HEP/Ed previously given. Plan: [x] Continue per plan of care.    [] Other:      Treatment Charges: Mins Units   []  Modalities []  Ther Exercise     []  Manual Therapy     []  Ther Activities     [x]  Aquatics 43 3   []  Neuromuscular     [] Vasocompression     [] Gait Training     [] Dry needling        [] 1 or 2 muscles        [] 3 or more muscles     []  Other     Total Treatment time 43 3     Time In: 155 PM            Time Out: 241 PM    Electronically signed by:  Blima Crigler, PTA

## 2022-08-08 ENCOUNTER — HOSPITAL ENCOUNTER (OUTPATIENT)
Dept: PHYSICAL THERAPY | Age: 47
Setting detail: THERAPIES SERIES
Discharge: HOME | End: 2022-08-08
Payer: MEDICARE

## 2022-08-08 PROCEDURE — 97113 AQUATIC THERAPY/EXERCISES: CPT

## 2022-08-08 NOTE — FLOWSHEET NOTE
[facility]   [address] [facility] [address]   Phone: [phone]   Fax: [phone]    Physical Therapy Daily Treatment Note      Date:  2022  Patient Name:  Sisi Cuenca    :  1975  MRN: [id number]  Physician:      Kika Muller MD                Insurance: [place] [employer]vantage,   # of visits allowed/remainin, Auth: NPRe (PATIENT USED 4 SESSION AT PT LINK IN )  Medical Diagnosis:     M51.26 (ICD-10-CM) - Lumbar disc herniation   M51.36 (ICD-10-CM) - Lumbar degenerative disc disease   Rehab Codes: M25.60 -BACK STIFFNESS, M54.50 -LBP, M62.830 -BACK SPASMS,   Onset Date: 2021                      Next 's appt.: 10/18/2022    Visit# / total visits:  Cancels/No Shows: 0/0    Subjective: Worked out this weekend with 20-25# weights doing chest press, rows, overhead press and curls- noted soreness after in neck and back. 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 Pain:  [x] Yes  [] No Location: Mid to lower back; denies radicular symptoms Pain Rating: (0-10 scale) 4/10  Pain altered Tx:  [x] No  [] Yes  Action:    Comments: Emphasis on postural awareness and activation of core during exercise. Patient 13' late this date    Objective:  Modalities:   Precautions:  Exercises:    [facility] Exercise Log  Aquatic, Hip & DLS Program- Phase 1    Date of Eval:                                Primary PT: Letitia Johnson PT  Diagnosis:   Things to Focus On (goals):   Surgical Precautions:  Medical Precautions:  [] C-9 dates  [] Occ Med   [] Medicare       Date 22    Visit # 2/12 3/12 4/12 5/12          Cuffs   Walk F/L/R 2 Laps  2 Laps 2 Laps 2 Laps    Marching 10x 2 Laps 2 Laps 2 Laps       Low Box Low     Squats 10x5\" 15x5\" 10x5\" 15x5\"    Step-Ups F/L        Step Down F/L   Low Lat 10x Low F/L 15x    Heel-toe raises 10x 15x 10x DC    SLR F/L/R 10x 15x 10x 15x Fast    Hip/Knee Flex/Ext  15x 10x 15x    F/L Lunges     Add           Kickboard Ex. Med Large Large Large    Iso Abd. 10x5\" 10x5\" 10x5\" 10x5\"    Push-pull 10x 10x 15x 15x    Paddling  10x 15x 15x            UE Format: Chest Deep Chest Deep Chest Deep Count/Time Paddles   Horiz Abd/Add 10x 15x 15x 1' (21)    IR/ER (wipers)  15x 15x 1'    Alt Flex/Ext 10x 15x 15x 1' ( 30)    Alt Press Down  15x 15x 1'    Abd/Add 10x 15x 15x 1' (37 )            Deep Water: 1 Noodle 1 Noodle 1 Noodle 1 Noodle    Hang 5'       Cycling  2' 2' 2'    Jacks  1' 2' 2'    X-Country  1' 2' 2'            Balance        SLS                Stretches        Achllies 2x30\" - 2x30\" 2x30\"    Hamstring 2x30\" 2x30\" 2x30\" 2x30\"            Breast Stroke 2 Laps 2 Laps Seated Noodle  1/2 Lap Fwd/bkwd Seated Noodle 1 Lap retro only    Pain Rating 4-5 4 4 4        Specific Instructions for next treatment: Add lunges and begin small buoyancy cuffs with LE to challenge trunk stability    Assessment: [x] Progressing toward goals. Increased reps and speed to tolerance as noted above with U/LE exercise to promote core stabilization. Patient tolerated well overall. Added forward step downs without issue. Continued difficulty with forward breast stroke due to seated balance on noodle being a challenge- performed retro only this date with emphasis on activation of abdominal muscles. Patient demonstrates good effort throughout program    [] No change. [] Other:    Patient would continue to benefit from skilled physical therapy services in order to: [address]. STG: (to be met in  12 treatments)  ? Pain: NO GREATER THAN 5/10 PERFORMING BENDING AT WORK  ? ROM: 50-75% NORM TRUNK MOTION FOR IMPROVED BENDING  ?  Strength: BEGIN CORE STRENGTHENING PROGRAM  ? Function: IMPROVE OSWESTRY (19/50) BY 6  Independent with Home Exercise Programs        Patient goals: RELIEF FROM BACK ACHE, LEARN STRENGTHENING EXERCISES    Pt. Education:  [x] Yes  [] No  [x] Reviewed Prior HEP/Ed  Method of Education: [x] Verbal  [x] Demo  [] Written  Comprehension of Education:  [x] Verbalizes understanding. [x] Demonstrates understanding. [] Needs review. [] Demonstrates/verbalizes HEP/Ed previously given. Plan: [x] Continue per plan of care.    [] Other:      Treatment Charges: Mins Units   []  Modalities     []  Ther Exercise     []  Manual Therapy     []  Ther Activities     [x]  Aquatics 39 3   []  Neuromuscular     [] Vasocompression     [] Gait Training     [] Dry needling        [] 1 or 2 muscles        [] 3 or more muscles     []  Other     Total Treatment time 39 3     Time In: 110 PM            Time Out: 151 PM    Electronically signed by:  Ronald Deleon PTA

## 2022-08-10 ENCOUNTER — HOSPITAL ENCOUNTER (OUTPATIENT)
Dept: PHYSICAL THERAPY | Age: 47
Setting detail: THERAPIES SERIES
Discharge: HOME | End: 2022-08-10
Payer: MEDICARE

## 2022-08-10 PROCEDURE — 97113 AQUATIC THERAPY/EXERCISES: CPT

## 2022-08-10 NOTE — FLOWSHEET NOTE
[facility]   [facility]   Phone: [phone]   Fax: [phone]    Physical Therapy Daily Treatment Note      Date:  8/10/2022  Patient Name:  Marbin Ace    :  1975  MRN: [id number]  Physician:      Mario Morales MD                Insurance: [place] [employer]vantage,   # of visits allowed/remainin, Auth: NPRe (PATIENT USED 4 SESSION AT PT LINK IN )  Medical Diagnosis:     M51.26 (ICD-10-CM) - Lumbar disc herniation   M51.36 (ICD-10-CM) - Lumbar degenerative disc disease   Rehab Codes: M25.60 -BACK STIFFNESS, M54.50 -LBP, M62.830 -BACK SPASMS,   Onset Date: 2021                      Next 's appt.: 10/18/2022    Visit# / total visits:  Cancels/No Shows: 0/0    Subjective:  No new complaints- states symptoms are \"stable\" Feels taking his Lyrica helps. Reports pain remains in mid to lower back; no symptoms in LE   Pain:  [x] Yes  [] No Location: Mid to lower back; denies radicular symptoms Pain Rating: (0-10 scale) 4/10  Pain altered Tx:  [x] No  [] Yes  Action:    Comments: Patient 15' late this date- reviewed attendance policy with patient    Objective:  Modalities:   Precautions:  Exercises:    [facility] Exercise Log  Aquatic, Hip & DLS Program- Phase 1    Date of Eval:                                Primary PT: Letitia Johnson PT  Diagnosis:   Things to Focus On (goals):   Surgical Precautions:  Medical Precautions:  [] C-9 dates  [] Occ Med   [] Medicare       Date 7/27/22 8/1/22 8/4/22 8/8/22 8/10/22   Visit # 2/12 3/12 4/12 5/12 6/12        Sm Cuffs   Walk F/L/R 2 Laps  2 Laps 2 Laps 2 Laps 1 Lap   Marching 10x 2 Laps 2 Laps 2 Laps 1 Lap      Low Box Low  Low Box    Squats 10x5\" 15x5\" 10x5\" 15x5\" 10x5\"   Step-Ups F/L        Step Down F/L   Low Lat 10x Low F/L 15x Low 10x   Heel-toe raises 10x 15x 10x DC    SLR F/L/R 10x 15x 10x 15x Fast 10x Fast   Hip/Knee Flex/Ext  15x 10x 15x 10x F/L Lunges     Low F 10x           Kickboard Ex. Med Large Large Large -   Iso Abd. 10x5\" 10x5\" 10x5\" 10x5\" -   Push-pull 10x 10x 15x 15x -   Paddling  10x 15x 15x -           UE Format: Chest Deep Chest Deep Chest Deep Count/Time Paddles  Level 1 Open   Horiz Abd/Add 10x 15x 15x 1' (21) 1'   IR/ER (wipers)  15x 15x 1' 1'   Alt Flex/Ext 10x 15x 15x 1' ( 30) 1'   Alt Press Down  15x 15x 1' 1'   Abd/Add 10x 15x 15x 1' (40 ) 1'           Deep Water: 1 Noodle 1 Noodle 1 Noodle 1 Noodle 1 Noodle Sm Cuffs   Hang 5'       Cycling  2' 2' 2' 2'   Jacks  1' 2' 2' 2'   X-Country  1' 2' 2' 2'           Balance        SLS                Stretches        Achllies 2x30\" - 2x30\" 2x30\" 2x30\"   Hamstring 2x30\" 2x30\" 2x30\" 2x30\" 2x30\"           Breast Stroke 2 Laps 2 Laps Seated Noodle  1/2 Lap Fwd/bkwd Seated Noodle 1 Lap retro only    Pain Rating 4-5 4 4 4        Specific Instructions for next treatment: Transition to land based therapy next week    Assessment: [x] Progressing toward goals. Added buoyancy cuffs to LEs and paddles to UEs with exercise this date encouraging increased speed to challenge trunk stability. Also added forward lunges. Overall patient tolerated well. Limited treatment time this date due to patient late    [] No change. [] Other:    Patient would continue to benefit from skilled physical therapy services in order to: [address] [facility]. STG: (to be met in  12 treatments)  ? Pain: NO GREATER THAN 5/10 PERFORMING BENDING AT WORK  ? ROM: 50-75% NORM TRUNK MOTION FOR IMPROVED BENDING  ? Strength: BEGIN CORE STRENGTHENING PROGRAM  ? Function: IMPROVE OSWESTRY (19/50) BY 6  Independent with Home Exercise Programs        Patient goals: RELIEF FROM BACK ACHE, LEARN STRENGTHENING EXERCISES    Pt.  Education:  [x] Yes  [] No  [x] Reviewed Prior HEP/Ed  Method of Education: [x] Verbal  [x] Demo  [] Written  Comprehension of Education:  [x] Verbalizes understanding. [x] Demonstrates understanding. [] Needs review. [] Demonstrates/verbalizes HEP/Ed previously given. Plan: [x] Continue per plan of care with transition to land therapy.    [] Other:      Treatment Charges: Mins Units   []  Modalities     []  Ther Exercise     []  Manual Therapy     []  Ther Activities     [x]  Aquatics 35 2   []  Neuromuscular     [] Vasocompression     [] Gait Training     [] Dry needling        [] 1 or 2 muscles        [] 3 or more muscles     []  Other     Total Treatment time 35 2     Time In: 145 PM            Time Out: 220 PM    Electronically signed by:  Gonzalo Parnell PTA

## 2022-08-15 ENCOUNTER — HOSPITAL ENCOUNTER (OUTPATIENT)
Dept: PHYSICAL THERAPY | Age: 47
Setting detail: THERAPIES SERIES
Discharge: HOME | End: 2022-08-15
Payer: MEDICARE

## 2022-08-15 PROCEDURE — 97140 MANUAL THERAPY 1/> REGIONS: CPT

## 2022-08-15 PROCEDURE — 97110 THERAPEUTIC EXERCISES: CPT

## 2022-08-15 NOTE — PROGRESS NOTES
AND IMPROVE FUNCTION. STG: (to be met in  12 treatments)  ? Pain: NO GREATER THAN 5/10 PERFORMING BENDING AT WORK  ? ROM: 50-75% NORM TRUNK MOTION FOR IMPROVED BENDING  ? Strength: BEGIN CORE STRENGTHENING PROGRAM  ? Function: IMPROVE OSWESTRY (19/50) BY 6  Independent with Home Exercise Programs  LTG: (to be met in TBD treatments)        Patient goals: RELIEF FROM BACK ACHE, LEARN STRENGTHENING EXERCISES    Pt. Education:  [x] Yes  [] No  [x] Reviewed Prior HEP/Ed    HOME Exercise PROGRAM Reps/ Time Weight/ Level Comments   [employer]         CORNER STRETCH         PIRIFORMIS STRETCH         T BAND ROW AND PULL DOWNS                   Method of Education: [x] Verbal  [x] Demo  [] Written  Comprehension of Education:  [x] Verbalizes understanding. [x] Demonstrates understanding. [] Needs review. [] Demonstrates/verbalizes HEP/Ed previously given. Plan: [x] Continue per plan of care. [] Other:      Treatment Charges: Mins Units   []  Modalities     [x]  Ther Exercise 30 2   [x]  Manual Therapy 10 1   []  Ther Activities     []  Aquatics     []  Neuromuscular     [] Vasocompression     [] Gait Training     [] Dry needling        [] 1 or 2 muscles        [] 3 or more muscles     []  Other     Total Treatment time 40 3     Time In:1345            Time Out: 1430    Electronically signed by:   Matt Diallo PT

## 2022-08-17 ENCOUNTER — TELEPHONE (OUTPATIENT)
Dept: FAMILY MEDICINE CLINIC | Age: 47
End: 2022-08-17

## 2022-08-17 DIAGNOSIS — M54.16 RIGHT LUMBAR RADICULITIS: ICD-10-CM

## 2022-08-17 DIAGNOSIS — M51.26 LUMBAR DISC HERNIATION: Primary | ICD-10-CM

## 2022-08-17 NOTE — TELEPHONE ENCOUNTER
Patient needs to do physical capacity test, I can refer him for that test.  But he needs to fill form  through [employer].  I placed a referral for physical therapy to do that test.

## 2022-08-18 ENCOUNTER — HOSPITAL ENCOUNTER (OUTPATIENT)
Dept: PHYSICAL THERAPY | Age: 47
Setting detail: THERAPIES SERIES
Discharge: HOME | End: 2022-08-18
Payer: MEDICARE

## 2022-08-18 PROCEDURE — 97110 THERAPEUTIC EXERCISES: CPT

## 2022-08-18 PROCEDURE — 97140 MANUAL THERAPY 1/> REGIONS: CPT

## 2022-08-18 NOTE — PROGRESS NOTES
[facility] Outpatient Physical Therapy   [address]   Phone: [phone]   Fax: [phone]    Physical Therapy Daily Treatment Note      Date:  2022  Patient Name:  Leopoldo Levin    :  1975  MRN: [id number]  Physician:      Ronald Sorto MD                Insurance: [place] [employer],   # of visits allowed/remainin, Auth: NPRe (PATIENT USED 4 SESSION AT PT LINK IN )  Medical Diagnosis:     M51.26 (ICD-10-CM) - Lumbar disc herniation   M51.36 (ICD-10-CM) - Lumbar degenerative disc disease   Rehab Codes: M25.60 -BACK STIFFNESS, M54.50 -LBP, M62.830 -BACK SPASMS,   Onset Date: 2021                      Next 's appt.: 10/18/2022     Visit# / total visits:         Cancels/No Shows: 0/0      Subjective:  TOOK PAIN MEDS PRIOR TO THERAPY TODAY. THINKS AQUATIC THERAPY IMPROVED HIS BACK MOBILITY. POOR COMPLIANCE WITH HEP. Pain:  [] Yes  [] No Location: NECK 4/10, BACK 5/10 Pain Rating: (0-10 scale) /10  Pain altered Tx:  [x] No  [] Yes  Action:      Objective: PATIENT WAS 10' LATE FOR HIS APPOINTMENT, THEREFORE HIS TREATMENT WAS CUT SHORT. Modalities: NONE   Precautions: NONE IDENTIFIED  Exercises:  Exercise Reps/ Time Weight/ Level Completed  Today Comments   NUSTEP 5' L5 X GATHERED SUBJECTIVE INFO   LTR ON PHYSIO BALL 5\"X10 EA  X    BRIDGE ON  PHYSIO BALL 5\"X10 EA  X    HS STRETCH 30\"X 3 EA   ASSIST OF THERAPIST   MARCHING WITH ABD BRACE 10 EA RED X ADDED T BAND TODAY   SLR WITH ABD BRACE 5\"X10 EA      HIP FLEX STRETCH WITH LE OVER EDGE OF TABLE 30\"X3 EA   ASSIST TO THERAPIST   CLAM SHELL  5\"X10 EA RED X SUPINE TODAY   Other: PATIENT ASKED FOR ANTHER COPY OF HEP, REVIEWED HEP  MANUAL: HYPERVOLT TO MIN AND LOW BACK IN PRONE X10'. INTENSITY WAS MEDIUM USING THE FLAT ATTACHMENT. Specific Instructions for next treatment: PROGRESS PROGRAM      Assessment: [x] Progressing toward goals. SUBJECTIVE IMPROVEMENT IN MOBILITY    [] No change. [] Other:    [] Patient would continue to benefit from skilled physical therapy services in order to: [address]. STG: (to be met in  12 treatments)  ? Pain: NO GREATER THAN 5/10 PERFORMING BENDING AT WORK  ? ROM: 50-75% NORM TRUNK MOTION FOR IMPROVED BENDING  ? Strength: BEGIN CORE STRENGTHENING PROGRAM  ? Function: IMPROVE OSWESTRY (19/50) BY 6  Independent with Home Exercise Programs  LTG: (to be met in TBD treatments)        Patient goals: RELIEF FROM BACK ACHE, LEARN STRENGTHENING EXERCISES    Pt. Education:  [x] Yes  [] No  [x] Reviewed Prior HEP/Ed    HOME Exercise PROGRAM Reps/ Time Weight/ Level Comments   PRESS UPS  10X    QD HEP   CORNER STRETCH  30\" X3    QD HEP   PIRIFORMIS STRETCH  30\"X3    QD HEP   T BAND ROW AND PULL DOWNS  15X2 GREEN  QD HEP             Method of Education: [x] Verbal  [x] Demo  [x] Written (ISSUED ANOTHER COPY 8/18)  Comprehension of Education:  [x] Verbalizes understanding. [x] Demonstrates understanding. [] Needs review. [] Demonstrates/verbalizes HEP/Ed previously given. Plan: [x] Continue per plan of care. [] Other:      Treatment Charges: Mins Units   []  Modalities     [x]  Ther Exercise 25 2   [x]  Manual Therapy 10 1   []  Ther Activities     []  Aquatics     []  Neuromuscular     [] Vasocompression     [] Gait Training     [] Dry needling        [] 1 or 2 muscles        [] 3 or more muscles     []  Other     Total Treatment time 35 2     Time In:1410          Time Out: 3821    Electronically signed by:   Anthony Liang PT

## 2022-08-22 ENCOUNTER — HOSPITAL ENCOUNTER (OUTPATIENT)
Dept: PHYSICAL THERAPY | Age: 47
Setting detail: THERAPIES SERIES
Discharge: HOME | End: 2022-08-22
Payer: MEDICARE

## 2022-08-22 PROCEDURE — 97140 MANUAL THERAPY 1/> REGIONS: CPT

## 2022-08-22 PROCEDURE — 97110 THERAPEUTIC EXERCISES: CPT

## 2022-08-22 NOTE — PROGRESS NOTES
[facility]   [address]   Phone: [phone]   Fax: [phone]    Physical Therapy Daily Treatment Note      Date:  2022  Patient Name:  Trace Reich    :  1975  MRN: [id number]  Physician:      Joseluis Prabhakar MD                Insurance: [place] [employer],   # of visits allowed/remainin, Auth: NPRe (PATIENT USED 4 SESSION AT PT LINK IN )  Medical Diagnosis:     M51.26 (ICD-10-CM) - Lumbar disc herniation   M51.36 (ICD-10-CM) - Lumbar degenerative disc disease   Rehab Codes: M25.60 -BACK STIFFNESS, M54.50 -LBP, M62.830 -BACK SPASMS,   Onset Date: 2021                      Next 's appt.: 10/18/2022     Visit# / total visits:         Cancels/No Shows: 0/0      Subjective: NOT COMPLIANT WITH HEP DUE TO WORK SCHEDULE. Pain:  [] Yes  [] No Location: NECK/SHOULDERS 5/10, BACK 4/10 Pain Rating: (0-10 scale) /10  Pain altered Tx:  [x] No  [] Yes  Action:      Objective: PATIENT WAS 10' LATE FOR HIS APPOINTMENT, THEREFORE HIS TREATMENT WAS CUT SHORT. Modalities: NONE   Precautions: NONE IDENTIFIED  Exercises:  Exercise Reps/ Time Weight/ Level Completed  Today Comments   NUSTEP 5' L5 X GATHERED SUBJECTIVE INFO   MAT EXERCISES       LTR ON PHYSIO BALL 5\"X10 EA  X    BRIDGE ON  PHYSIO BALL 5\"X10 EA  X NO UE STABILIZATION -SH FLEXED 90*    HS STRETCH 30\"X 3 EA  X ASSIST OF THERAPIST   MARCHING WITH ABD BRACE 10 EA RED X ADDED T BAND TODAY   SLR WITH ABD BRACE 5\"X10 EA  X    HIP FLEX STRETCH WITH LE OVER EDGE OF TABLE 30\"X3 EA  X ASSIST TO THERAPIST   CLAM SHELL  5\"X10 EA RED  SUPINE TODAY   STANDING EX       TRX T's 10X2  X    TRX ROLL OUTS 10X2  X                  Other: PATIENT CUES FOR ALIGNMENT DURING THER EX, FORGETS WHAT HE IS DOING DURING AN ACTIVITY. MANUAL: HYPERVOLT TO MIN AND LOW BACK IN PRONE X10'. INTENSITY WAS MEDIUM USING THE ACCORDION. ATTACHMENT.     Specific Instructions for next treatment: PROGRESS PROGRAM      Assessment: [x] Progressing toward goals. [] No change. [] Other:    [] Patient would continue to benefit from skilled physical therapy services in order to: [address] [facility]. STG: (to be met in  12 treatments)  ? Pain: NO GREATER THAN 5/10 PERFORMING BENDING AT WORK  ? ROM: 50-75% NORM TRUNK MOTION FOR IMPROVED BENDING  ? Strength: BEGIN CORE STRENGTHENING PROGRAM  ? Function: IMPROVE OSWESTRY (19/50) BY 6  Independent with Home Exercise Programs  LTG: (to be met in TBD treatments)        Patient goals: RELIEF FROM BACK ACHE, LEARN STRENGTHENING EXERCISES    Pt. Education:  [x] Yes  [] No  [] Reviewed Prior HEP/Ed    HOME Exercise PROGRAM Reps/ Time Weight/ Level Comments   PRESS [employer]  10X    QD HEP   CORNER STRETCH  30\" X3    QD HEP   PIRIFORMIS STRETCH  30\"X3    QD HEP   T BAND ROW AND PULL DOWNS  15X2 GREEN  QD HEP             Method of Education: [x] Verbal  [x] Demo  [] Written (ISSUED ANOTHER COPY 8/18)  Comprehension of Education:  [x] Verbalizes understanding. [x] Demonstrates understanding. [] Needs review. [] Demonstrates/verbalizes HEP/Ed previously given. Plan: [x] Continue per plan of care. [] Other:      Treatment Charges: Mins Units   []  Modalities     [x]  Ther Exercise 25 2   [x]  Manual Therapy 10 1   []  Ther Activities     []  Aquatics     []  Neuromuscular     [] Vasocompression     [] Gait Training     [] Dry needling        [] 1 or 2 muscles        [] 3 or more muscles     []  Other     Total Treatment time 35 2     Time In:1340         Time Out: 7246    Electronically signed by:   Ismael March PT

## 2022-08-24 ENCOUNTER — TELEPHONE (OUTPATIENT)
Dept: PAIN MANAGEMENT | Age: 47
End: 2022-08-24

## 2022-08-25 ENCOUNTER — HOSPITAL ENCOUNTER (OUTPATIENT)
Dept: PHYSICAL THERAPY | Age: 47
Setting detail: THERAPIES SERIES
Discharge: HOME | End: 2022-08-25
Payer: MEDICARE

## 2022-08-25 PROCEDURE — 97110 THERAPEUTIC EXERCISES: CPT

## 2022-08-25 NOTE — PROGRESS NOTES
[facility] Outpatient Physical Therapy   [address]   Phone: [phone]   Fax: [phone]    Physical Therapy Daily Treatment Note/PROGRESS NOTE      Date:  2022  Patient Name:  Amanda Valentino    :  1975  MRN: [id number]  Physician:      Janelle Chavez MD                Insurance: [place] [employer],   # of visits allowed/remainin, Auth: NPRe (PATIENT USED 4 SESSION AT PT LINK IN )  Medical Diagnosis:     M51.26 (ICD-10-CM) - Lumbar disc herniation   M51.36 (ICD-10-CM) - Lumbar degenerative disc disease   Rehab Codes: M25.60 -BACK STIFFNESS, M54.50 -LBP, M62.830 -BACK SPASMS,   Onset Date: 2021                      Next 's appt.: 10/18/2022   Visit# / total visits:         Cancels/No Shows: 0/0      Subjective: PATIENT NOTES IMPROVED ROM ESPECIALLY TRUNK ROTATION SINCE STARTING PHYSICAL THERAPY. HE IS NOW ABLE TO TURN AND LOOK BEHIND WITH MINIMAL PAIN AND GREATER EASE OF MOVEMENT. HE STATES HE IS GETTING BETTER REST DUE TO LESS PAIN AT NIGHT. THE WORST PAIN HE HAS HAD IN THE LAST WEEK WAS 4/10. HE IS NOT COMPLIANT WITH HIS HEP DUE TO WORK SCHEDULE.        Pain:  [x] Yes  [] No Location: MID BACK Pain Rating: (0-10 scale) 3/10  Pain altered Tx:  [x] No  [] Yes  Action:    Objective:     OSWESTRY BACK DISABILITY QUESTIONNAIRE: 17/50 (IMPROVED BY 2)            ROM   Lumbar     Flexion 25-50% OF NORM (NOW 50%)   Extension 50%   Rotation L  50-75% (NOW 75%) R 50-75% (NOW 75%)   Sidebend L 25% R 25%   AROM LE WNL WNL     Precautions: NONE IDENTIFIED  Exercises:  Exercise Reps/ Time Weight/ Level Completed  Today Comments   NUSTEP 5' L5 X GATHERED SUBJECTIVE INFO   MAT EXERCISES       LTR ON PHYSIO BALL 5\"X10 EA  X    BRIDGE ON  PHYSIO BALL 5\"X10 EA  X NO UE STABILIZATION -SH FLEXED 90*    HS STRETCH 30\"X 3 EA  X ASSIST OF THERAPIST   MARCHING WITH ABD BRACE 10 EA RED X ADDED T BAND TODAY   SLR WITH ABD BRACE 5\"X10 EA  X    HIP FLEX STRETCH Other:      Treatment Charges: Mins Units   []  Modalities     [x]  Ther Exercise 40 3     Manual Therapy     []  Ther Activities     []  Aquatics     []  Neuromuscular     [] Vasocompression     [] Gait Training     [] Dry needling        [] 1 or 2 muscles        [] 3 or more muscles     []  Other     Total Treatment time 40 3     Time In:1335       Time Out: 3723    Electronically signed by: Nelson Cunningham, [facility] Out[facility] Physical Therapy  [address] [place] [address]. [address]         Phone: [phone]       Fax: [phone]    Physical Therapy Plan of Care    Date:  2022  Patient: Sean Chaidez  : 1975  MRN: [id number]  Physician:      Azael Mercedes MD                Medical Diagnosis:     M51.26 (ICD-10-CM) - Lumbar disc herniation   M51.36 (ICD-10-CM) - Lumbar degenerative disc disease   Rehab Codes: M25.60 -BACK STIFFNESS, M54.50 -LBP, M62.830 -BACK SPASMS    Treatment Plan:  [x] Therapeutic Exercise    [] Modalities:  [] Therapeutic Activity    [] Ultrasound  [] Electrical Stimulation  [] Gait Training     [] Massage       [] Lumbar/Cervical Traction  [] Neuromuscular Re-education [x] Cold/hotpack [] Iontophoresis: 4 mg/mL  [x] Instruction in HEP             Dexamethasone Sodium  [x] Manual Therapy             Phosphate 40-80 mAmin  [] Aquatic Therapy    [] Other:       Frequency:  2 x/week for 8 visits      Electronically signed by: Nelson Cunningham PT        Physician Signature:________________________________Date:__________________  By signing above or cosigning this note, I have reviewed this plan of care and certify a need for medically necessary rehabilitation services.

## 2022-08-29 DIAGNOSIS — M51.26 LUMBAR DISC HERNIATION: ICD-10-CM

## 2022-08-29 DIAGNOSIS — M51.36 LUMBAR DEGENERATIVE DISC DISEASE: ICD-10-CM

## 2022-08-30 ENCOUNTER — HOSPITAL ENCOUNTER (OUTPATIENT)
Dept: PAIN MANAGEMENT | Age: 47
Discharge: HOME | End: 2022-08-30
Payer: MEDICARE

## 2022-08-30 VITALS
DIASTOLIC BLOOD PRESSURE: 79 MMHG | SYSTOLIC BLOOD PRESSURE: 130 MMHG | HEART RATE: 78 BPM | RESPIRATION RATE: 16 BRPM | OXYGEN SATURATION: 98 %

## 2022-08-30 DIAGNOSIS — M51.26 LUMBAR DISC HERNIATION: ICD-10-CM

## 2022-08-30 DIAGNOSIS — M51.36 LUMBAR DEGENERATIVE DISC DISEASE: ICD-10-CM

## 2022-08-30 DIAGNOSIS — M54.16 RIGHT LUMBAR RADICULITIS: Primary | ICD-10-CM

## 2022-08-30 PROCEDURE — 99213 OFFICE O/P EST LOW 20 MIN: CPT

## 2022-08-30 PROCEDURE — 99213 OFFICE O/P EST LOW 20 MIN: CPT | Performed by: NURSE PRACTITIONER

## 2022-08-30 RX ORDER — PREGABALIN 25 MG/1
25 CAPSULE ORAL DAILY
Qty: 30 CAPSULE | Refills: 0 | Status: SHIPPED | OUTPATIENT
Start: 2022-08-30 | End: 2022-09-26 | Stop reason: SDUPTHER

## 2022-08-30 RX ORDER — PREGABALIN 25 MG/1
25 CAPSULE ORAL DAILY
Qty: 30 CAPSULE | Refills: 0 | Status: CANCELLED | OUTPATIENT
Start: 2022-08-30 | End: 2022-09-29

## 2022-08-30 RX ORDER — PREGABALIN 25 MG/1
25 CAPSULE ORAL 2 TIMES DAILY
Qty: 60 CAPSULE | Refills: 0 | OUTPATIENT
Start: 2022-08-30 | End: 2022-09-29

## 2022-08-30 ASSESSMENT — ENCOUNTER SYMPTOMS
SHORTNESS OF BREATH: 0
CONSTIPATION: 0
COUGH: 0
BACK PAIN: 1

## 2022-08-30 NOTE — PROGRESS NOTES
02/23/14    HAND SURGERY  08/02/13    URETHRA SURGERY  1985       No Known Allergies      Current Outpatient Medications:     mirtazapine (REMERON) 15 MG tablet, take 1/2 tablet by mouth at bedtime, Disp: , Rfl:     venlafaxine (EFFEXOR XR) 37.5 MG extended release capsule, take 1 capsule by mouth once daily every morning, Disp: , Rfl:     fluticasone (FLONASE) 50 MCG/ACT nasal spray, 1 spray by Each Nostril route in the morning., Disp: 16 g, Rfl: 0    cetirizine (ZYRTEC) 10 MG tablet, TAKE 1 TABLET BY MOUTH DAILY, Disp: 30 tablet, Rfl: 3    Elastic Bandages & Supports (LUMBAR BACK BRACE/SUPPORT PAD) MISC, 1 each by Does not apply route daily as needed (BACK PAIN), Disp: 1 each, Rfl: 0    vitamin D3 (CHOLECALCIFEROL) 25 MCG (1000 UT) TABS tablet, TAKE 1 TABLET BY MOUTH DAILY, Disp: 90 tablet, Rfl: 1    losartan (COZAAR) 50 MG tablet, TAKE 1 & 1/2 TABLETS BY MOUTH DAILY, Disp: 90 tablet, Rfl: 1    pregabalin (LYRICA) 25 MG capsule, Take 1 capsule by mouth 2 times daily for 30 days. , Disp: 60 capsule, Rfl: 0    Multiple Vitamin (MULTIVITAMIN ADULT PO), Take by mouth daily (Patient not taking: Reported on 7/18/2022), Disp: , Rfl:     ibuprofen (ADVIL;MOTRIN) 600 MG tablet, Take 1 tablet by mouth every 6 hours as needed for Pain (Patient not taking: Reported on 7/18/2022), Disp: 20 tablet, Rfl: 0    clotrimazole-betamethasone (LOTRISONE) 1-0.05 % cream, Apply topically 2 times daily. , Disp: 45 g, Rfl: 0    EPINEPHrine (EPIPEN) 0.3 MG/0.3ML SOAJ injection, Inject 1 mL into the muscle as needed (Anaphylaxis) Use as directed for allergic reaction, Disp: 2 each, Rfl: 0    Elastic Bandages & Supports (KNEE BRACE/HINGED/LARGE) MISC, Use daily for knee pain, Disp: 1 each, Rfl: 0    Blood Pressure KIT, Dx: HTN. Needs automatic blood pressure machine to monitor his blood pressure., Disp: 1 kit, Rfl: 0    Ciclopirox (LOPROX) 0.77 % gel, Apply in between toes twice daily. , Disp: 1 Tube, Rfl: 3    Family History   Problem Relation Age of Onset    Hypertension Mother     Diabetes Mother        Social History     Socioeconomic History    Marital status:      Spouse name: Not on file    Number of children: Not on file    Years of education: Not on file    Highest education level: Not on file   Occupational History    Not on file   Tobacco Use    Smoking status: Former     Types: Cigars     Quit date: 11/16/2018     Years since quitting: 3.7    Smokeless tobacco: Never    Tobacco comments:     quit 2016   Substance and Sexual Activity    Alcohol use: Yes     Alcohol/week: 0.0 standard drinks     Comment: beer once week    Drug use: Yes     Types: Marijuana [place])    Sexual activity: Not on file   Other Topics Concern    Not on file   Social History Narrative    Not on file     Social Determinants of Health     Financial Resource Strain: Not on file   Food Insecurity: Not on file   Transportation Needs: Not on file   Physical Activity: Not on file   Stress: Not on file   Social Connections: Not on file   Intimate Partner Violence: Not on file   Housing Stability: Not on file       Review of Systems:  Review of Systems   Constitutional: Negative for chills and fever. Cardiovascular:  Negative for chest pain and palpitations. Respiratory:  Negative for cough and shortness of breath. Musculoskeletal:  Positive for back pain. Gastrointestinal:  Negative for constipation. Neurological:  Negative for disturbances in coordination and loss of balance. Physical Exam:  /79   Pulse 78   Resp 16   SpO2 98%     Physical Exam  HENT:      Head: Normocephalic. Pulmonary:      Effort: Pulmonary effort is normal.   Musculoskeletal:         General: Normal range of motion. Cervical back: Normal range of motion. Lumbar back: Tenderness present. Skin:     General: Skin is warm and dry. Neurological:      Mental Status: He is alert and oriented to person, place, and time.        Record/Diagnostics Review:    FINDINGS: BONES/ALIGNMENT: There is straightening of lumbar lordosis. The vertebral   body heights are maintained. The bone marrow signal appears unremarkable. SPINAL CORD: The conus terminates normally. SOFT TISSUES: No paraspinal mass identified. L1-L2: There is no significant disc herniation, spinal canal stenosis or   neural foraminal narrowing. L2-L3: There is no significant disc herniation, spinal canal stenosis or   neural foraminal narrowing. L3-L4: There is no significant disc herniation, spinal canal stenosis or   neural foraminal narrowing. L4-L5: Grade 1 retrolisthesis, disc bulging with 3 mm left foraminal disc   extrusion with superior migration. The extruded disc contacts exiting left   L4 nerve root. Mild bilateral facet arthropathy. Mild bilateral neural   foraminal narrowing. L5-S1: Grade 1 retrolisthesis with 3 mm disc bulging effaces the anterior   thecal sac. Mild bilateral facet arthropathy. Mild bilateral neural   foraminal narrowing. S1-S2: Unremarkable. Impression   Straightening of lumbar lordosis. Transitional lumbar anatomy. At L4-L5, grade 1 retrolisthesis, mild bilateral neural foraminal narrowing. Left foraminal disc extrusion with superior migration contacts exiting left   L4 nerve root. At L5-S1,Grade 1 retrolisthesis and mild bilateral neural foraminal narrowing.        Assessment:  Problem List Items Addressed This Visit       Right lumbar radiculitis - Primary (Chronic)    Relevant Medications    pregabalin (LYRICA) 25 MG capsule    Lumbar disc herniation (Chronic)    Relevant Medications    pregabalin (LYRICA) 25 MG capsule    Lumbar degenerative disc disease    Relevant Medications    pregabalin (LYRICA) 25 MG capsule          Treatment Plan:  Continue PT  Pt is not interested in interventions   Continue Lyrica 25 mg QD  Follow up in 4 weeks    I have reviewed the chief complaint and history of present illness (including ROS and PFSH) and vital documentation by my staff and I agree with their documentation and have added where applicable.

## 2022-08-31 ENCOUNTER — HOSPITAL ENCOUNTER (OUTPATIENT)
Dept: PHYSICAL THERAPY | Age: 47
Setting detail: THERAPIES SERIES
Discharge: HOME | End: 2022-08-31
Payer: MEDICARE

## 2022-08-31 PROCEDURE — 97110 THERAPEUTIC EXERCISES: CPT

## 2022-08-31 NOTE — FLOWSHEET NOTE
HYPERVOLT TO MIN AND LOW BACK IN PRONE X10'. INTENSITY WAS MEDIUM USING THE ACCORDION ATTACHMENT.  (Held 8/25/2022)    Specific Instructions for next treatment: PROGRESS PROGRAM AS PATIENT IS ABLE.  ENCOURAGE COMPLIANCE WITH HEP. Assessment: [x] Progressing toward goals. 8/31:   Continued with core strengthening in supine and standing this session. Patient continues to need frequent cues to stay on task, maintain core engagement and was educated on maintaining a neutral spine with performing a posterior pelvic tilt. Added TB exercises to improve posture and core strength with performing double and single extremity exercises. Frequent cues needed to relax his shoulders and correct posture as patient tends to demonstrate UT/shoulder elevation and bend his knees with PPT in standing. Patient reported feeling more muscle engagement in his core throughout exercises. Patient reports his pain is unchanged at the end of session. Educated patient on importance of performing HEP consistently and to increase awareness of posture. [] No change. [] Other:    [x] Patient would continue to benefit from skilled physical therapy services in order to: INCREASE LUMBAR MOBILITY, [address] STG: (to be met in  12 treatments)  ? Pain: NO GREATER THAN 5/10 PERFORMING BENDING AT WORK (MET)  ? ROM: 50-75% NORM TRUNK MOTION FOR IMPROVED BENDING (MET)  ? Strength: BEGIN CORE STRENGTHENING PROGRAM (MET)  ? Function: IMPROVE OSWESTRY (19/50) BY 6  (IMPROVED NOT MET)  Independent with Home Exercise Programs (MET)  LTG: (to be met in 18 treatments)  OSWESTRY =/< 10/50   PROGRESS HEP     Patient goals: RELIEF FROM BACK ACHE, LEARN STRENGTHENING EXERCISES    Pt.  Education:  [x] Yes  [] No  [] Reviewed Prior HEP/Ed    HOME Exercise PROGRAM Reps/ Time Weight/ Level Comments   PRESS [employer]  10X    QD HEP   CORNER STRETCH  30\" X3    QD HEP   PIRIFORMIS STRETCH  30\"X3    QD HEP T BAND ROW AND PULL DOWNS  15X2 GREEN  QD HEP             Method of Education: [x] Verbal  [x] Demo  [] Written (ISSUED ANOTHER COPY 8/18)  Comprehension of Education:  [x] Verbalizes understanding. [x] Demonstrates understanding. [] Needs review. [] Demonstrates/verbalizes HEP/Ed previously given. Plan: [x] Continue per plan of care.    [] Other:      Treatment Charges: Mins Units   []  Modalities     [x]  Ther Exercise 55 4     Manual Therapy     []  Ther Activities     []  Aquatics     []  Neuromuscular     [] Vasocompression     [] Gait Training     [] Dry needling        [] 1 or 2 muscles        [] 3 or more muscles     []  Other     Total Treatment time 55 4     Time In:1758       Time Out: 1853    Electronically signed by:  Devon Glover PTA

## 2022-09-07 ENCOUNTER — HOSPITAL ENCOUNTER (OUTPATIENT)
Dept: PHYSICAL THERAPY | Age: 47
Setting detail: THERAPIES SERIES
Discharge: HOME | End: 2022-09-07
Payer: MEDICARE

## 2022-09-07 PROCEDURE — 9900000067 HC THERAPEUTIC EXERCISE EA 15 MINS (SELF-PAY)

## 2022-09-07 NOTE — FLOWSHEET NOTE
[facility] Outpatient Physical Therapy   [address]   Phone: [phone]   Fax: [phone]    Physical Therapy Daily Treatment Note      Date:  2022  Patient Name:  Leopoldo Levin    :  1975  MRN: [id number]  Physician:      Ronald Sorto MD                Insurance: [place] [employer]age,   # of visits allowed/remainin, Auth: NPRe (PATIENT USED 4 SESSION AT PT LINK IN )  Medical Diagnosis:     M51.26 (ICD-10-CM) - Lumbar disc herniation   M51.36 (ICD-10-CM) - Lumbar degenerative disc disease   Rehab Codes: M25.60 -BACK STIFFNESS, M54.50 -LBP, M62.830 -BACK SPASMS,   Onset Date: 2021                      Next 's appt.: 10/18/2022   Visit# / total visits:        Cancels/No Shows: 0/0      Subjective: Patient reporting to therapy with no new complaints. Patient states that over reaching can cause inc in pain. Patient reports continued pain in neck and low back. Patient reports that he is more cautious with movement at work. Pain:  [x] Yes  [] No Location: MID BACK Pain Rating: (0-10 scale) 4/10  Pain altered Tx:  [x] No  [] Yes  Action:    Objective:     Precautions: NONE IDENTIFIED  Exercises:  Exercise Reps/ Time Weight/ Level Completed  Today Comments   NUSTEP 5' L5  GATHERED SUBJECTIVE INFO   MAT EXERCISES       LTR ON PHYSIO BALL 5\"X10 EA      BRIDGE ON  PHYSIO BALL 5\"X10 EA   NO UE STABILIZATION -SH FLEXED 90*    HS STRETCH 30\"X 2 EA  X Strap; cues for technique   MARCHING WITH ABD BRACE with PPT 10x2 EA RED  ADDED T BAND TODAY   SLR WITH ABD BRACE 5\"X10 EA      HIP FLEX STRETCH WITH LE OVER EDGE OF TABLE 30\"X3 EA   ASSIST TO THERAPIST   CLAM SHELL  5\"X10 EA RED  SUPINE TODAY   PPT 15x 5\"      Open books 10x 3\"  X 9/7 added to improve thoracic mobility; pt reporting pain in the ribs with mobility.    STANDING EX       TRX T's 10X2      TRX ROLL OUTS 10X2      TRX SQUAT  TRX ISOMETRIC SQUAT 10X  10\"X3  X    PALOFF WALK OUT WITH PRESS 5x2 GREEN X 8/31 without press this date   [place]illas's Press 10x2 Green X    Rows/extension double/single 10x2 green X           Other: PATIENT CUES FOR POSTURE AND TO ENGAGE CORE DURING THER EX. PATIENT STARTS TALKING AND FORGETS WHAT HE IS DOING DURING AN ACTIVITY. MANUAL: HYPERVOLT TO MIN AND LOW BACK IN PRONE X10'. INTENSITY WAS MEDIUM USING THE ACCORDION ATTACHMENT.  (Held 8/25/2022)    Specific Instructions for next treatment: PROGRESS PROGRAM AS PATIENT IS ABLE.  ENCOURAGE COMPLIANCE WITH HEP. Assessment: [x] Progressing toward goals. Continued with core strengthening this session with continued cues needed to correct standing posture and ensure core engagement. Added open books this session to improve thoracic mobility with patient reporting increased pressure/discomfort in ribs with exercise. Patient also continues to require cues to relax shoulders with TB exercises. [] No change. [] Other:    [x] Patient would continue to benefit from skilled physical therapy services in order to: INCREASE LUMBAR MOBILITY, [address] STG: (to be met in  12 treatments)  ? Pain: NO GREATER THAN 5/10 PERFORMING BENDING AT WORK (MET)  ? ROM: 50-75% NORM TRUNK MOTION FOR IMPROVED BENDING (MET)  ? Strength: BEGIN CORE STRENGTHENING PROGRAM (MET)  ? Function: IMPROVE OSWESTRY (19/50) BY 6  (IMPROVED NOT MET)  Independent with Home Exercise Programs (MET)  LTG: (to be met in 18 treatments)  OSWESTRY =/< 10/50   PROGRESS HEP     Patient goals: RELIEF FROM BACK ACHE, LEARN STRENGTHENING EXERCISES    Pt.  Education:  [x] Yes  [] No  [] Reviewed Prior HEP/Ed    HOME Exercise PROGRAM Reps/ Time Weight/ Level Comments   [employer]  10X    QD HEP   CORNER STRETCH  30\" X3    QD HEP   PIRIFORMIS STRETCH  30\"X3    QD HEP   T BAND ROW AND PULL DOWNS  15X2 GREEN  QD HEP             Method of Education: [x] Verbal  [x] Demo  [] Written (ISSUED ANOTHER COPY 8/18)  Comprehension of Education:  [x] Verbalizes understanding. [x] Demonstrates understanding. [] Needs review. [] Demonstrates/verbalizes HEP/Ed previously given. Plan: [x] Continue per plan of care.    [] Other:      Treatment Charges: Mins Units   []  Modalities     [x]  Ther Exercise 48 3     Manual Therapy     []  Ther Activities     []  Aquatics     []  Neuromuscular     [] Vasocompression     [] Gait Training     [] Dry needling        [] 1 or 2 muscles        [] 3 or more muscles     []  Other     Total Treatment time 48 3     Time In:1805      Time Out: 8641    Electronically signed by:  Jaycee Dickinson PTA

## 2022-09-12 ENCOUNTER — HOSPITAL ENCOUNTER (OUTPATIENT)
Dept: PHYSICAL THERAPY | Age: 47
Setting detail: THERAPIES SERIES
Discharge: HOME | End: 2022-09-12
Payer: MEDICARE

## 2022-09-12 NOTE — FLOWSHEET NOTE
[] [facility]) - [facility] & Therapy  [address]  [place]: [phone]   F: [phone]     Physical Therapy Cancel/No Show note    Date: 2022  Patient: Amanda Valentino  : 1975  MRN: [id number]    Visit Count:   Cancels/No Shows to date:     For today's appointment patient:    [x]  Cancelled    [] Rescheduled appointment    [] No-show     Reason given by patient:    []  Patient ill    []  Conflicting appointment    [] No transportation      [x] Conflict with work    [] No reason given    [] Weather related    [] [id number]    [] Other:      Comments:        [x] Next appointment was confirmed    Electronically signed by: Chanell Landaverde PTA

## 2022-09-15 ENCOUNTER — HOSPITAL ENCOUNTER (OUTPATIENT)
Dept: PHYSICAL THERAPY | Age: 47
Setting detail: THERAPIES SERIES
Discharge: HOME | End: 2022-09-15
Payer: MEDICARE

## 2022-09-15 PROCEDURE — 97110 THERAPEUTIC EXERCISES: CPT

## 2022-09-15 NOTE — FLOWSHEET NOTE
Rows/extension double/single 10x2 green X    3 way hip 15x  2# X    Other: PATIENT CUES FOR POSTURE AND TO ENGAGE CORE DURING THER EX. PATIENT STARTS TALKING AND FORGETS WHAT HE IS DOING DURING AN ACTIVITY. MANUAL: HYPERVOLT TO MIN AND LOW BACK IN PRONE X10'. INTENSITY WAS MEDIUM USING THE ACCORDION ATTACHMENT.  (Held 8/25/2022)    Specific Instructions for next treatment: PROGRESS PROGRAM AS PATIENT IS ABLE.  ENCOURAGE COMPLIANCE WITH HEP. Assessment: [x] Progressing toward goals. Continued with core and hip strengthening this session with added 3 way hip. No increase in pain noted at the end of session. Cues needed to keep patient on task and to ensure core engagement and to correct standing posture. [] No change. [] Other:    [x] Patient would continue to benefit from skilled physical therapy services in order to: INCREASE LUMBAR MOBILITY, [address] ST[facility]: (to be met in  12 treatments)  ? Pain: NO GREATER THAN 5/10 PERFORMING BENDING AT WORK (MET)  ? ROM: 50-75% NORM TRUNK MOTION FOR IMPROVED BENDING (MET)  ? Strength: BEGIN CORE STRENGTHENING PROGRAM (MET)  ? Function: IMPROVE OSWESTRY (19/50) BY 6  (IMPROVED NOT MET)  Independent with Home Exercise Programs (MET)  LTG: (to be met in 18 treatments)  OSWESTRY =/< 10/50   PROGRESS HEP     Patient goals: RELIEF FROM BACK ACHE, LEARN STRENGTHENING EXERCISES    Pt. Education:  [x] Yes  [] No  [] Reviewed Prior HEP/Ed    HOME Exercise PROGRAM Reps/ Time Weight/ Level Comments   PRESS UPS  10X    QD HEP   CORNER STRETCH  30\" X3    QD HEP   PIRIFORMIS STRETCH  30\"X3    QD HEP   T BAND ROW AND PULL DOWNS  15X2 GREEN  QD HEP             Method of Education: [x] Verbal  [x] Demo  [] Written (ISSUED ANOTHER COPY 8/18)  Comprehension of Education:  [x] Verbalizes understanding. [x] Demonstrates understanding. [] Needs review. [] Demonstrates/verbalizes HEP/Ed previously given.      Plan: [x] Continue per plan of care.    [] Other:      Treatment Charges: Mins Units   []  Modalities     [x]  Ther Exercise 43 3     Manual Therapy     []  Ther Activities     []  Aquatics     []  Neuromuscular     [] Vasocompression     [] Gait Training     [] Dry needling        [] 1 or 2 muscles        [] 3 or more muscles     []  Other     Total Treatment time 43 3     Time [id number]      Time Out: 2997    Electronically signed by:  Yolanda Ty PTA

## 2022-09-23 ENCOUNTER — HOSPITAL ENCOUNTER (OUTPATIENT)
Dept: PHYSICAL THERAPY | Age: 47
Setting detail: THERAPIES SERIES
Discharge: HOME | End: 2022-09-23
Payer: MEDICARE

## 2022-09-23 PROCEDURE — 97140 MANUAL THERAPY 1/> REGIONS: CPT

## 2022-09-23 PROCEDURE — 97110 THERAPEUTIC EXERCISES: CPT

## 2022-09-23 NOTE — FLOWSHEET NOTE
[facility]   [address]   Phone: [phone]   Fax: [phone]    Physical Therapy Daily Treatment Note      Date:  2022  Patient Name:  Yara Hassan    :  1975  MRN: [id number]  Physician:      Becka Zavala MD                Insurance: [employer],   # of visits allowed/remainin, Auth: NPRe (PATIENT USED 4 SESSION AT PT LINK IN )  Medical Diagnosis:     M51.26 (ICD-10-CM) - Lumbar disc herniation   M51.36 (ICD-10-CM) - Lumbar degenerative disc disease   Rehab Codes: M25.60 -BACK STIFFNESS, M54.50 -LBP, M62.830 -BACK SPASMS,   Onset Date: 2021                      Next 's appt.: 10/18/2022   Visit# / total visits:        Cancels/No Shows: 1/0      Subjective: PATIENT REPORTS TODAY THAT LB HAS FELT PRETTY WELL OVERALL THIS PAST WEEK. REPORTED THAT HE FEELS MORE OF A DULL ACH VS PAIN UPON ARRIVAL. REPORTED THAT HE IS FIGHTING INSOMNIA. Pain:  [] Yes  [x] No Location: MID BACK Pain Rating: (0-10 scale) 0/10  Pain altered Tx:  [x] No  [] Yes  Action:    Objective:     Precautions: NONE IDENTIFIED  Exercises:  Exercise Reps/ Time Weight/ Level Completed  Today Comments   NUSTEP 5' L5  GATHERED SUBJECTIVE INFO   MAT EXERCISES       LTR ON PHYSIO BALL 5\"X10 EA      BRIDGE ON  PHYSIO BALL 5\"X10 EA   NO UE STABILIZATION -SH FLEXED 90*    HS STRETCH 30\"X 2 EA  X Strap; cues for technique   Piriformis stretch 2x 30\"  X    MARCHING WITH ABD BRACE with PPT 10x2 EA RED  ADDED T BAND TODAY   SLR WITH ABD BRACE 5\"X10 EA      HIP FLEX STRETCH WITH LE OVER EDGE OF TABLE 30\"X3 EA   ASSIST TO THERAPIST   CLAM SHELL  5\"X10 EA RED  SUPINE TODAY   PPT 15x 5\"      Open books 10x 3\"    added to improve thoracic mobility; pt reporting pain in the ribs with mobility.    BRIDGES 15X2 3\" HOLD  X    QUADRUPED ALT LE 10X2  X    FIREHYDRANTS 10X2  X    BIRDDOGS 10X  X           STANDING EX       TRX T's 10X2  x    TRX ROLL OUTS 10X2      TRX SQUAT  TRX ISOMETRIC SQUAT 10X2  10\"X3  x    PALOFF WALK OUT WITH PRESS 5x2 BLUE X 8/31 without press this date   Paloff Press 10x2 BLUE X    Rows/extension double/single 10x2 BLUE X    3 way hip 15x  2#     Other: PATIENT CUES FOR POSTURE AND TO ENGAGE CORE DURING THER EX. PATIENT STARTS TALKING AND FORGETS WHAT HE IS DOING DURING AN ACTIVITY. MANUAL: HYPERVOLT TO MID AND LOW BACK IN PRONE X 8'. INTENSITY WAS MEDIUM USING THE ACCORDION ATTACHMENT. Specific Instructions for next treatment: PROGRESS PROGRAM AS PATIENT IS ABLE.  ENCOURAGE COMPLIANCE WITH HEP. Assessment: [x] Progressing toward goals. CONTINUED TO WORK ON CORE AND HIP STRENGTHENING WITH CHARTED EXERCISES. ADDED QUADRUPED EXERCISES FOR TO PROGRESS CORE AND HIP STRENGTHENING PROGRAM. CUES PROVIDED WITH QUADRUPED EXERCISES TO IMPROVE POSTURING AND TECHNIQUE WITH NOTABLE IMPROVEMENT FOLLOWING. INCREASED RESISTANCE WITH TBAND EXERCISES TO INCREASE STRENGTHENING POTENTIAL OF EXERCISE.       [] No change. [] Other:    [x] Patient would continue to benefit from skilled physical therapy services in order to: INCREASE LUMBAR MOBILITY, [address] ST[facility]: (to be met in  12 treatments)  ? Pain: NO GREATER THAN 5/10 PERFORMING BENDING AT WORK (MET)  ? ROM: 50-75% NORM TRUNK MOTION FOR IMPROVED BENDING (MET)  ? Strength: BEGIN CORE STRENGTHENING PROGRAM (MET)  ? Function: IMPROVE OSWESTRY (19/50) BY 6  (IMPROVED NOT MET)  Independent with Home Exercise Programs (MET)  LTG: (to be met in 18 treatments)  OSWESTRY =/< 10/50   PROGRESS HEP     Patient goals: RELIEF FROM BACK ACHE, LEARN STRENGTHENING EXERCISES    Pt.  Education:  [x] Yes  [] No  [] Reviewed Prior HEP/Ed    HOME Exercise PROGRAM Reps/ Time Weight/ Level Comments   [employer]  10X    QD HEP   CORNER STRETCH  30\" X3    QD HEP   PIRIFORMIS STRETCH  30\"X3    QD HEP   T BAND ROW AND PULL DOWNS  15X2 GREEN  QD HEP Method of Education: [x] Verbal  [x] Demo  [] Written (ISSUED ANOTHER COPY 8/18)  Comprehension of Education:  [x] Verbalizes understanding. [x] Demonstrates understanding. [] Needs review. [] Demonstrates/verbalizes HEP/Ed previously given. Plan: [x] Continue per plan of care.    [] Other:      Treatment Charges: Mins Units   []  Modalities     [x]  Ther Exercise 35 2     Manual Therapy 8 1   []  Ther Activities     []  Aquatics     []  Neuromuscular     [] Vasocompression     [] Gait Training     [] Dry needling        [] 1 or 2 muscles        [] 3 or more muscles     []  Other     Total Treatment time 43 3   *3 min unbilled time during treatment session for pt to use restroom    Time In: 6352      Time Out: 7454     Electronically signed by:  Siddharth Dang PTA

## 2022-09-26 ENCOUNTER — HOSPITAL ENCOUNTER (OUTPATIENT)
Dept: PAIN MANAGEMENT | Age: 47
Discharge: HOME | End: 2022-09-26
Payer: MEDICARE

## 2022-09-26 VITALS
BODY MASS INDEX: 28.41 KG/M2 | WEIGHT: 181 LBS | SYSTOLIC BLOOD PRESSURE: 134 MMHG | HEIGHT: 67 IN | RESPIRATION RATE: 16 BRPM | HEART RATE: 70 BPM | TEMPERATURE: 97.3 F | DIASTOLIC BLOOD PRESSURE: 86 MMHG | OXYGEN SATURATION: 97 %

## 2022-09-26 DIAGNOSIS — M54.50 CHRONIC BILATERAL LOW BACK PAIN WITHOUT SCIATICA: ICD-10-CM

## 2022-09-26 DIAGNOSIS — M51.36 LUMBAR DEGENERATIVE DISC DISEASE: ICD-10-CM

## 2022-09-26 DIAGNOSIS — G89.29 CHRONIC BILATERAL LOW BACK PAIN WITHOUT SCIATICA: ICD-10-CM

## 2022-09-26 DIAGNOSIS — M51.26 LUMBAR DISC HERNIATION: ICD-10-CM

## 2022-09-26 DIAGNOSIS — M54.2 CERVICAL SPINE PAIN: ICD-10-CM

## 2022-09-26 DIAGNOSIS — M54.16 RIGHT LUMBAR RADICULITIS: Primary | ICD-10-CM

## 2022-09-26 PROCEDURE — 99213 OFFICE O/P EST LOW 20 MIN: CPT

## 2022-09-26 PROCEDURE — 99212 OFFICE O/P EST SF 10 MIN: CPT | Performed by: NURSE PRACTITIONER

## 2022-09-26 RX ORDER — PREGABALIN 25 MG/1
25 CAPSULE ORAL DAILY
Qty: 30 CAPSULE | Refills: 1 | Status: SHIPPED | OUTPATIENT
Start: 2022-09-26 | End: 2022-10-26

## 2022-09-26 ASSESSMENT — ENCOUNTER SYMPTOMS
BOWEL INCONTINENCE: 0
BACK PAIN: 1

## 2022-09-26 ASSESSMENT — PAIN SCALES - GENERAL: PAINLEVEL_OUTOF10: 4

## 2022-09-26 NOTE — PROGRESS NOTES
Chief Complaint   Patient presents with    Back Pain     Med refill         PMH  Pt complains of low back pain. MRI lumbar with L4-L5 grade 1 retrolisthesis and mild bilateral neural foraminal narrowing. Left foraminal disc extrusion with superior migration contacts exiting left L4 nerve root He has never had injections or surgery to the lumbar area. He is currently in PT with moderate benefit. Currently taking Lyrica -25 mg QD with benefit. Discussed trying lumbar steroid epidural and he declines. Back Pain  This is a chronic problem. The current episode started more than 1 year ago. The problem occurs constantly. The problem has been gradually improving since onset. The pain is present in the lumbar spine. The quality of the pain is described as aching and shooting (throbbing). The pain radiates to the right knee, right foot and right thigh. The pain is at a severity of 4/10. The pain is The same all the time. The symptoms are aggravated by bending, sitting and standing. Associated symptoms include headaches, numbness and tingling. Pertinent negatives include no bladder incontinence, bowel incontinence, chest pain, fever or weakness. He has tried ice and heat for the symptoms. Patient denies any new neurological symptoms. No bowel or bladder incontinence, no weakness, and no falling.     Past Medical History:   Diagnosis Date    Allergic rhinitis     Anxiety     Cervical spine pain 1/3/2019    Gastric reflux 3/30/2016    Headache(784.0)     Hepatitis     Hyperlipidemia 5/31/2018    Hypertension     IBS (irritable bowel syndrome) 12/17/2014    Insomnia     Iron deficiency anemia     Lumbar degenerative disc disease 12/13/2021    Mild single current episode of major depressive disorder ([facility] [address] 8/2/2018    Pancreatitis     Right lumbar radiculitis 1/13/2022    Seasonal allergies        Past Surgical History:   Procedure Laterality Date    ANKLE SURGERY  02/23/14    HAND SURGERY  08/02/13    URETHRA SURGERY  1985       No Known Allergies      Current Outpatient Medications:     pregabalin (LYRICA) 25 MG capsule, Take 1 capsule by mouth daily for 30 days. , Disp: 30 capsule, Rfl: 0    mirtazapine (REMERON) 15 MG tablet, take 1/2 tablet by mouth at bedtime, Disp: , Rfl:     venlafaxine (EFFEXOR XR) 37.5 MG extended release capsule, take 1 capsule by mouth once daily every morning, Disp: , Rfl:     fluticasone (FLONASE) 50 MCG/ACT nasal spray, 1 spray by Each Nostril route in the morning., Disp: 16 g, Rfl: 0    cetirizine (ZYRTEC) 10 MG tablet, TAKE 1 TABLET BY MOUTH DAILY, Disp: 30 tablet, Rfl: 3    Elastic Bandages & Supports (LUMBAR BACK BRACE/SUPPORT PAD) MISC, 1 each by Does not apply route daily as needed (BACK PAIN), Disp: 1 each, Rfl: 0    vitamin D3 (CHOLECALCIFEROL) 25 MCG (1000 UT) TABS tablet, TAKE 1 TABLET BY MOUTH DAILY, Disp: 90 tablet, Rfl: 1    losartan (COZAAR) 50 MG tablet, TAKE 1 & 1/2 TABLETS BY MOUTH DAILY, Disp: 90 tablet, Rfl: 1    Multiple Vitamin (MULTIVITAMIN ADULT PO), Take by mouth daily (Patient not taking: No sig reported), Disp: , Rfl:     ibuprofen (ADVIL;MOTRIN) 600 MG tablet, Take 1 tablet by mouth every 6 hours as needed for Pain (Patient not taking: No sig reported), Disp: 20 tablet, Rfl: 0    clotrimazole-betamethasone (LOTRISONE) 1-0.05 % cream, Apply topically 2 times daily. , Disp: 45 g, Rfl: 0    EPINEPHrine (EPIPEN) 0.3 MG/0.3ML SOAJ injection, Inject 1 mL into the muscle as needed (Anaphylaxis) Use as directed for allergic reaction, Disp: 2 each, Rfl: 0    Elastic Bandages & Supports (KNEE BRACE/HINGED/LARGE) MISC, Use daily for knee pain, Disp: 1 each, Rfl: 0    Blood Pressure KIT, Dx: HTN. Needs automatic blood pressure machine to monitor his blood pressure., Disp: 1 kit, Rfl: 0    Ciclopirox (LOPROX) 0.77 % gel, Apply in between toes twice daily. , Disp: 1 Tube, Rfl: 3    Family History   Problem Relation Age of Onset    Hypertension Mother     Diabetes Mother Social History     Socioeconomic History    Marital status:      Spouse name: Not on file    Number of children: Not on file    Years of education: Not on file    Highest education level: Not on file   Occupational History    Not on file   Tobacco Use    Smoking status: Former     Types: Cigars     Quit date: 11/16/2018     Years since quitting: 3.8    Smokeless tobacco: Never    Tobacco comments:     quit 2016   Substance and Sexual Activity    Alcohol use: Yes     Alcohol/week: 0.0 standard drinks     Comment: beer once week    Drug use: Yes     Types: Marijuana Al Landen)    Sexual activity: Not on file   Other Topics Concern    Not on file   Social History Narrative    Not on file     Social Determinants of Health     Financial Resource Strain: Not on file   Food Insecurity: Not on file   Transportation Needs: Not on file   Physical Activity: Not on file   Stress: Not on file   Social Connections: Not on file   Intimate Partner Violence: Not on file   Housing Stability: Not on file       Review of Systems:  Review of Systems   Constitutional: Negative for fever. Cardiovascular:  Negative for chest pain and palpitations. Musculoskeletal:  Positive for back pain and neck pain. Gastrointestinal:  Negative for bowel incontinence. Genitourinary:  Negative for bladder incontinence. Neurological:  Positive for headaches, numbness and tingling. Negative for disturbances in coordination, loss of balance and weakness. Physical Exam:  /86   Pulse 70   Temp 97.3 °F (36.3 °C)   Resp 16   Ht 5' 7\" (1.702 m)   Wt 181 lb (82.1 kg)   SpO2 97%   BMI 28.35 kg/m²     Physical Exam  HENT:      Head: Normocephalic. Pulmonary:      Effort: Pulmonary effort is normal.   Musculoskeletal:         General: Normal range of motion. Cervical back: Normal range of motion. Tenderness present. Lumbar back: Tenderness present. Skin:     General: Skin is warm and dry.    Neurological:      Mental Status: He is alert and oriented to person, place, and time. Record/Diagnostics Review:    FINDINGS:   BONES/ALIGNMENT: There is straightening of lumbar lordosis. The vertebral   body heights are maintained. The bone marrow signal appears unremarkable. SPINAL CORD: The conus terminates normally. SOFT TISSUES: No paraspinal mass identified. L1-L2: There is no significant disc herniation, spinal canal stenosis or   neural foraminal narrowing. L2-L3: There is no significant disc herniation, spinal canal stenosis or   neural foraminal narrowing. L3-L4: There is no significant disc herniation, spinal canal stenosis or   neural foraminal narrowing. L4-L5: Grade 1 retrolisthesis, disc bulging with 3 mm left foraminal disc   extrusion with superior migration. The extruded disc contacts exiting left   L4 nerve root. Mild bilateral facet arthropathy. Mild bilateral neural   foraminal narrowing. L5-S1: Grade 1 retrolisthesis with 3 mm disc bulging effaces the anterior   thecal sac. Mild bilateral facet arthropathy. Mild bilateral neural   foraminal narrowing. S1-S2: Unremarkable. Impression   Straightening of lumbar lordosis. Transitional lumbar anatomy. At L4-L5, grade 1 retrolisthesis, mild bilateral neural foraminal narrowing. Left foraminal disc extrusion with superior migration contacts exiting left   L4 nerve root. At L5-S1,Grade 1 retrolisthesis and mild bilateral neural foraminal narrowing.            Assessment:  Problem List Items Addressed This Visit       Right lumbar radiculitis - Primary (Chronic)    Relevant Medications    pregabalin (LYRICA) 25 MG capsule    Lumbar disc herniation (Chronic)    Relevant Medications    pregabalin (LYRICA) 25 MG capsule    Chronic bilateral low back pain without sciatica    Cervical spine pain    Lumbar degenerative disc disease    Relevant Medications    pregabalin (LYRICA) 25 MG capsule

## 2022-09-27 ENCOUNTER — HOSPITAL ENCOUNTER (OUTPATIENT)
Dept: PHYSICAL THERAPY | Age: 47
Setting detail: THERAPIES SERIES
Discharge: HOME | End: 2022-09-27
Payer: MEDICARE

## 2022-09-27 PROCEDURE — 97110 THERAPEUTIC EXERCISES: CPT

## 2022-09-27 NOTE — FLOWSHEET NOTE
[facility]   [address]   Phone: [phone]   Fax: [phone]    Physical Therapy Daily Treatment Note      Date:  2022  Patient Name:  Tracey White    :  1975  MRN: [id number]  Physician:      Yunior Sharma MD                Insurance: [employer],   # of visits allowed/remainin, Auth: NPRe (PATIENT USED 4 SESSION AT PT LINK IN )  Medical Diagnosis:     M51.26 (ICD-10-CM) - Lumbar disc herniation   M51.36 (ICD-10-CM) - Lumbar degenerative disc disease   Rehab Codes: M25.60 -BACK STIFFNESS, M54.50 -LBP, M62.830 -BACK SPASMS,   Onset Date: 2021                      Next 's appt.: 10/18/2022   Visit# / total visits:        Cancels/No Shows: 1/0      Subjective: Patient reports to therapy stating he was working in a tight space and was crouched down for most of the day. Pain:  [] Yes  [x] No Location: MID BACK Pain Rating: (0-10 scale) 4/10  Pain altered Tx:  [x] No  [] Yes  Action:    Objective:     Precautions: NONE IDENTIFIED  Exercises:  Exercise Reps/ Time Weight/ Level Completed  Today Comments   NUSTEP 5' L5  GATHERED SUBJECTIVE INFO   MAT EXERCISES       LTR ON PHYSIO BALL 5\"X10 EA      BRIDGE ON  PHYSIO BALL 5\"X10 EA   NO UE STABILIZATION -SH FLEXED 90*    HS STRETCH 30\"X 2 EA  X Strap; cues for technique   Piriformis stretch 2x 30\"  X    MARCHING WITH ABD BRACE with PPT 10x2 EA RED  ADDED T BAND TODAY   SLR WITH ABD BRACE 5\"X10 EA      HIP FLEX STRETCH WITH LE OVER EDGE OF TABLE 30\"X3 EA   ASSIST TO THERAPIST   CLAM SHELL  5\"X10 EA RED  SUPINE TODAY   PPT 15x 5\"      Open books 10x 3\"   9/7 added to improve thoracic mobility; pt reporting pain in the ribs with mobility.    BRIDGES 15X2 3\" HOLD  X    QUADRUPED ALT LE 10X2  X    FIREHYDRANTS 10X2  X    BIRDDOGS 10X  X           STANDING EX       TRX T's 10X2      TRX ROLL OUTS 10X2      TRX SQUAT  TRX ISOMETRIC SQUAT 10X2  10\"X3      NATE WALK OUT WITH PRESS 5x2 BLUE  8/31 without press this date   Paloff Press 10x2 BLUE     Rows/extension double/single 10x2 BLUE X    3 way hip 15x  2#     Squat 10x2  X    Other: PATIENT CUES FOR POSTURE AND TO ENGAGE CORE DURING THER EX. PATIENT STARTS TALKING AND FORGETS WHAT HE IS DOING DURING AN ACTIVITY. MANUAL: HYPERVOLT TO MID AND LOW BACK IN PRONE X 8'. INTENSITY WAS MEDIUM USING THE ACCORDION ATTACHMENT. Specific Instructions for next treatment: PROGRESS PROGRAM AS PATIENT IS ABLE.  ENCOURAGE COMPLIANCE WITH HEP. Assessment: [x] Progressing toward goals. Continued with core and hip strengthening this session with cues as needed to correct patient's technique and posture during exercises. Added squats with focus on abdominal bracing and posture. Patient reporting discomfort in the L side of his low back. Patient reporting 5-6/10 pain at the end of session. [] No change. [] Other:    [x] Patient would continue to benefit from skilled physical therapy services in order to: INCREASE LUMBAR MOBILITY, [address] ST[facility]: (to be met in  12 treatments)  ? Pain: NO GREATER THAN 5/10 PERFORMING BENDING AT WORK (MET)  ? ROM: 50-75% NORM TRUNK MOTION FOR IMPROVED BENDING (MET)  ? Strength: BEGIN CORE STRENGTHENING PROGRAM (MET)  ? Function: IMPROVE OSWESTRY (19/50) BY 6  (IMPROVED NOT MET)  Independent with Home Exercise Programs (MET)  LTG: (to be met in 18 treatments)  OSWESTRY =/< 10/50   PROGRESS HEP     Patient goals: RELIEF FROM BACK ACHE, LEARN STRENGTHENING EXERCISES    Pt.  Education:  [x] Yes  [] No  [] Reviewed Prior HEP/Ed    HOME Exercise PROGRAM Reps/ Time Weight/ Level Comments   [employer]  10X    QD HEP   CORNER STRETCH  30\" X3    QD HEP   PIRIFORMIS STRETCH  30\"X3    QD HEP   T BAND ROW AND PULL DOWNS  15X2 GREEN  QD HEP             Method of Education: [x] Verbal  [x] Demo  [] Written (ISSUED ANOTHER COPY

## 2022-09-29 ENCOUNTER — HOSPITAL ENCOUNTER (OUTPATIENT)
Dept: PHYSICAL THERAPY | Age: 47
Setting detail: THERAPIES SERIES
Discharge: HOME | End: 2022-09-29
Payer: MEDICARE

## 2022-09-29 PROCEDURE — 97110 THERAPEUTIC EXERCISES: CPT

## 2022-09-29 NOTE — FLOWSHEET NOTE
[address] [facility]   [address]   Phone: [phone]   Fax: [phone]    Physical Therapy Daily Treatment Note/PROGRESS NOTE      Date:  2022  Patient Name:  Darrell Rodgers    :  1975  MRN: [id number]  Physician:      Debra Guerra MD                Insurance: [employer],   # of visits allowed/remainin, Auth: NPRe (PATIENT USED 4 SESSION AT PT LINK IN )  Medical Diagnosis:     M51.26 (ICD-10-CM) - Lumbar disc herniation   M51.36 (ICD-10-CM) - Lumbar degenerative disc disease   Rehab Codes: M25.60 -BACK STIFFNESS, M54.50 -LBP, M62.830 -BACK SPASMS,   Onset Date: 2021                      Next 's appt.: 10/18/2022   Visit# / total visits:        Cancels/No Shows: 10      Subjective: PATIENT REPORTS HE DOES NOT NOTE MUCH CHANGE IN HIS BACK PAIN SINCE BEGINNING PHYSICAL THERAPY. HE DOES FEEL HIS BACK MOBILITY AND TRUNK STRENGTH HAS IMPROVED. TODAY HER REPORTS LEFT SIDE \"KIDNEY PAIN\" INCREASED BY TWISTING BACK, STRAINING TO PERFORM AN ACTIVITY AND \"SLEEPING POSITIONS\". HE ALSO REPORTS MID TO UPPER BACK PAIN  WHICH IS INCREASED TODAY DUE TO SLEEPING SIDELYING POSITION LAST NIGHT. HE REPORTS HE IS TRYING DIFFERENT POSITIONS AND PILLOWS FOR NECK POSITIONING. HE STATES HE HAS 5 DIFFERENT TYPES OF PILLOWS FOR HIS NECK HE USES. REPORTS HE IS TAKING LESS MEDICATION FOR THIS PROBLEM. HE HAS CHANGED JOBS AND NOW HAS A LESS PHYSICALLY DEMANDING JOB BUT THIS HAS NOT RESULTED IN MUCH CHANGE IN HIS BACK PAIN EITHER. HE STATES HE THINKS \"THE PAIN IS GOING TO STAY THERE DUE TO HIS BACK ALIGNMENT\".     Pain:  [x] Yes  [] No Location: MID BACK Pain Rating: (0-10 scale) 4/10  (RANGES 4/10 TO 6-7/10)  DECREASED FROM 6/10  Pain altered Tx:  [x] No  [] Yes  Action:    Objective:     OSWESTRY BACK DISABILITY QUESTIONNAIRE:  (IMPROVED BY 1)         STRENGTH   ROM     Left Right Cervical     L1-2 Hip Flex 5 5 Flexion Hip Abd 4 (NOW 4+) 4 (NOW 4+) Extension     L3-4 Knee Ext 5 5 Rotation L R   L4 Ankle DF 5 5 Sidebend L R   L5 EHL 5 5 Retraction     S1 Plant. Flex  5  5 Lumbar     Abdominals     Flexion 25-50% OF NORM   (NOW 75%)   Erector Spinae     Extension 50% ( NOW 75%)         Rotation L  50-75% R 50-75%         Sidebend L 25% (NOW 25-50%) R 25% (NOW 25-50%)         AROM LE WNL WNL       Precautions: NONE IDENTIFIED  Exercises:  Exercise Reps/ Time Weight/ Level Completed  Today Comments   NUSTEP 5' L5  GATHERED SUBJECTIVE INFO   MAT EXERCISES       LTR ON PHYSIO BALL 5\"X10 EA      BRIDGE ON  PHYSIO BALL 5\"X10 EA   NO UE STABILIZATION -SH FLEXED 90*    HS STRETCH 30\"X 2 EA  X Strap; cues for technique   Piriformis stretch 2x 30\"  X    MARCHING WITH ABD BRACE with PPT 10x2 EA RED  ADDED T BAND TODAY   SLR WITH ABD BRACE 5\"X10 EA      HIP FLEX STRETCH WITH LE OVER EDGE OF TABLE 30\"X3 EA   ASSIST TO THERAPIST   CLAM SHELL  5\"X10 EA RED  SUPINE TODAY   PPT 15x 5\"      Open books 10x 3\"   9/7 added to improve thoracic mobility; pt reporting pain in the ribs with mobility. QUADRUPED ALT LE 10X2      FIRE HYDRANTS 10X2      BIRDDOGS 10X             STANDING EX       TRX LUNGES WITH TWIST 10 EA  X    TRX CHEST PRESS 10X2  X CUES FOR BETTER PLANK POSITION   TRX T's 10X2  X    TRX ROLL OUTS 10X2  X    TRX SQUAT  TRX ISOMETRIC SQUAT 10X2  10\"X3  X  X    TRX ROWS 10X2  X    PALOFF WALK OUT WITH PRESS 5x2 10# X    Paloff Press 10x2 BLUE     Rows/extension double/single 10x2 BLUE     3 way hip 15x  2#     Squat 10x2      Other: PATIENT CUES FOR POSTURE AND TO ENGAGE CORE DURING THER EX. PATIENT STARTS TALKING AND FORGETS WHAT HE IS DOING DURING AN ACTIVITY. MANUAL: HYPERVOLT TO MID AND LOW BACK IN PRONE X 8'. INTENSITY WAS MEDIUM USING THE ACCORDION ATTACHMENT.  (HELD 9/29/2022 DUE TO LIMITED TIME FOR REASSESSMENT)    Specific Instructions for next treatment: PROGRESS GYM PROGRAM FOCUSING ON CORE STRENGTHENING AND TRUNK ROM. PROGRESS HEP. PATIENT MAY BENEFIT FROM INSTRUCTION IN CORRECT LIFTING TECHNIQUES. Assessment:   MODEST IMPROVEMENT IN HIP STRENGTH, TRUNK ROM AND PAIN RATING AS COMPARED TO INITIAL EVALUATION AS NOTED BELOW. [x] Progressing toward goals. [] No change. [] Other:    [x] Patient would continue to benefit from skilled physical therapy services in order to: PROGRESS HOME EXERCISES SO PATIENT CAN CONTINUE CORE STRENGTHENING AND ROM EXERCISES AT D/C. STG: (to be met in  12 treatments)  ? Pain: NO GREATER THAN 5/10 PERFORMING BENDING AT WORK (MET 8/25/22)  ? ROM: 50-75% NORM TRUNK MOTION FOR IMPROVED BENDING (MET 8/25/22)  ? Strength: BEGIN CORE STRENGTHENING PROGRAM (MET 8/25/22)  ? Function: IMPROVE OSWESTRY (19/50) BY 6  (IMPROVED NOT MET)  Independent with Home Exercise Programs (MET 8/25/22)  LTG: (to be met in 18 treatments)  OSWESTRY =/< 10/50  (NOT MET)  PROGRESS HEP (NOT MET)     Patient goals: RELIEF FROM BACK ACHE, LEARN STRENGTHENING EXERCISES    Pt. Education:  [x] Yes  [] No  [] Reviewed Prior HEP/Ed    HOME Exercise PROGRAM Reps/ Time Weight/ Level Comments   PRESS UPS  10X    QD HEP   CORNER STRETCH  30\" X3    QD HEP   PIRIFORMIS STRETCH  30\"X3    QD HEP   T BAND ROW AND PULL DOWNS  15X2 GREEN  QD HEP             Method of Education: [x] Verbal  [x] Demo  [] Written   Comprehension of Education:  [x] Verbalizes understanding. [x] Demonstrates understanding. [] Needs review. [] Demonstrates/verbalizes HEP/Ed previously given. Plan: [x] Continue per plan of care.    [x] Other: PROGRESS HEP      Treatment Charges: Mins Units   []  Modalities     [x]  Ther Exercise 40 3     Manual Therapy     []  Ther Activities     []  Aquatics     []  Neuromuscular     [] Vasocompression     [] Gait Training     [] Dry needling        [] 1 or 2 muscles        [] 3 or more muscles     []  Other     Total Treatment time 40 3       Time In: 0998     Time Out: 6388    Electronically signed by:   Billy Jorge, LENORA

## 2022-10-03 ENCOUNTER — HOSPITAL ENCOUNTER (OUTPATIENT)
Dept: PHYSICAL THERAPY | Age: 47
Setting detail: THERAPIES SERIES
Discharge: HOME | End: 2022-10-03
Payer: MEDICARE

## 2022-10-03 PROCEDURE — 97110 THERAPEUTIC EXERCISES: CPT

## 2022-10-03 NOTE — FLOWSHEET NOTE
[facility]   [address]   Phone: [phone]   Fax: [phone]    Physical Therapy Daily Treatment Note      Date:  10/3/2022  Patient Name:  Char Gray    :  1975  MRN: [id number]  Physician:      Grazyna Licea MD                Insurance: [employer],   # of visits allowed/remainin, Auth: NPRe (PATIENT USED 4 SESSION AT PT LINK IN )  Medical Diagnosis:     M51.26 (ICD-10-CM) - Lumbar disc herniation   M51.36 (ICD-10-CM) - Lumbar degenerative disc disease   Rehab Codes: M25.60 -BACK STIFFNESS, M54.50 -LBP, M62.830 -BACK SPASMS,   Onset Date: 2021                      Next 's appt.: 10/18/2022   Visit# / total visits: 15/20       Cancels/No Shows: 1/0      Subjective: Patient reporting he continues to have pain with mobility. Pain:  [] Yes  [x] No Location: entire back Pain Rating: (0-10 scale) 5/10  Pain altered Tx:  [x] No  [] Yes  Action:    Objective:     Precautions: NONE IDENTIFIED  Exercises:  Exercise Reps/ Time Weight/ Level Completed  Today Comments   NUSTEP 5' L5  GATHERED SUBJECTIVE INFO   MAT EXERCISES       LTR ON PHYSIO BALL 5\"X10 EA      BRIDGE ON  PHYSIO BALL 5\"X10 EA   NO UE STABILIZATION -SH FLEXED 90*    HS STRETCH 30\"X 2 EA  X Strap; cues for technique   Piriformis stretch 2x 30\"  X    MARCHING WITH ABD BRACE with PPT 10x2 EA RED  ADDED T BAND TODAY   SLR WITH ABD BRACE 5\"X10 EA      HIP FLEX STRETCH WITH LE OVER EDGE OF TABLE 30\"X3 EA   ASSIST TO THERAPIST   CLAM SHELL  5\"X10 EA RED  SUPINE TODAY   PPT 15x 5\"      Open books 10x 3\"    added to improve thoracic mobility; pt reporting pain in the ribs with mobility.    BRIDGES with hip ABD 10X2 3\" HOLD green X 10/3 added hip ABD with TB   QUADRUPED ALT LE 10X2  X    FIREHYDRANTS 10X2  X    BIRDDOGS 10X  X           STANDING EX       TRX T's 10X2      TRX ROLL OUTS 10X2      TRX SQUAT  TRX ISOMETRIC SQUAT 10X2  10\"X3       WALK OUT WITH PRESS 5x2 BLUE  8/31 without press this date   Paloff Press 10x2 BLUE     Rows/extension double/single 10x2 BLUE X    3 way hip 15x  2#     Squat 10x2  X    Other: PATIENT CUES FOR POSTURE AND TO ENGAGE CORE DURING THER EX. PATIENT STARTS TALKING AND FORGETS WHAT HE IS DOING DURING AN ACTIVITY. MANUAL: HYPERVOLT TO MID AND LOW BACK IN PRONE X 8'. INTENSITY WAS MEDIUM USING THE ACCORDION ATTACHMENT. Specific Instructions for next treatment: PROGRESS PROGRAM AS PATIENT IS ABLE.  ENCOURAGE COMPLIANCE WITH HEP. Assessment: [] Progressing toward goals. [x] No change. Continued with core strengthening to patient's tolerance. Patient continues to be limited with resisted rotation exercises as he reported inc discomfort with paloff press. Patient reports occasional pulling/discomfort on L side of low back/L side flank throughout the session. [] Other:    [x] Patient would continue to benefit from skilled physical therapy services in order to: INCREASE LUMBAR MOBILITY, [address] STG: (to be met in  12 treatments)  ? Pain: NO GREATER THAN 5/10 PERFORMING BENDING AT WORK (MET 8/25/22)  ? ROM: 50-75% NORM TRUNK MOTION FOR IMPROVED BENDING (MET 8/25/22)  ? Strength: BEGIN CORE STRENGTHENING PROGRAM (MET 8/25/22)  ? Function: IMPROVE OSWESTRY (19/50) BY 6  (IMPROVED NOT MET)  Independent with Home Exercise Programs (MET 8/25/22)  LTG: (to be met in 18 treatments)  OSWESTRY =/< 10/50  (NOT MET)  PROGRESS HEP (NOT MET)     Patient goals: RELIEF FROM BACK ACHE, LEARN STRENGTHENING EXERCISES       Pt.  Education:  [x] Yes  [] No  [] Reviewed Prior HEP/Ed    HOME Exercise PROGRAM Reps/ Time Weight/ Level Comments   [employer]  10X    QD HEP   CORNER STRETCH  30\" X3    QD HEP   PIRIFORMIS STRETCH  30\"X3    QD HEP   T BAND ROW AND PULL DOWNS  15X2 GREEN  QD HEP             Method of Education: [x] Verbal  [x] Demo  [] Written (ISSUED ANOTHER COPY 8/18)  Comprehension of Education:  [x] Verbalizes understanding. [x] Demonstrates understanding. [] Needs review. [] Demonstrates/verbalizes HEP/Ed previously given. Plan: [x] Continue per plan of care.    [] Other:      Treatment Charges: Mins Units   []  Modalities     [x]  Ther Exercise 41 3     Manual Therapy     []  Ther Activities     []  Aquatics     []  Neuromuscular     [] Vasocompression     [] Gait Training     [] Dry needling        [] 1 or 2 muscles        [] 3 or more muscles     []  Other     Total Treatment time 41 3       Time In: 6202     Time Out: 3519    Electronically signed by:  Magali Robles PTA

## 2022-10-10 ENCOUNTER — HOSPITAL ENCOUNTER (OUTPATIENT)
Dept: PHYSICAL THERAPY | Age: 47
Setting detail: THERAPIES SERIES
Discharge: HOME | End: 2022-10-10
Payer: MEDICARE

## 2022-10-10 NOTE — FLOWSHEET NOTE
[x] [facility] - [facility]  [address]  [place]: [phone]   F: [phone]     Physical Therapy Cancel/No Show note    Date: 10/10/2022  Patient: Char Gray  : 1975  MRN: [id number]    Visit Count: 15/20  Cancels/No Shows to date:     For today's appointment patient:    [x]  Cancelled    [] Rescheduled appointment    [] No-show     Reason given by patient:    [x]  Patient ill- pt called to cancel as he think he has food poisoning and is not able to make it to today's appt.     []  Conflicting appointment    [] No transportation      [] Conflict with work    [] No reason given    [] Weather related    [] [id number]    [] Other:      Comments:        [x] Next appointment was confirmed [address] 10/13/22 8AM    Electronically signed by: Carlos Ernandez PTA

## 2022-10-13 ENCOUNTER — HOSPITAL ENCOUNTER (OUTPATIENT)
Dept: PHYSICAL THERAPY | Age: 47
Setting detail: THERAPIES SERIES
Discharge: HOME | End: 2022-10-13
Payer: MEDICARE

## 2022-10-13 NOTE — FLOWSHEET NOTE
[x] [facility]) - [facility]  [address]  [place]: [phone]   F: [phone]     Physical Therapy Cancel/No Show note    Date: 10/13/2022  Patient: Jose Wilcox  : 1975  MRN: [id number]    Visit Count: 15/20  Cancels/No Shows to date: 3/1    For today's appointment patient:    [x]  Cancelled    [] Rescheduled appointment    [] No-show     Reason given by patient:    [x]  Patient ill    []  Conflicting appointment    [] No transportation      [] Conflict with work    [] No reason given    [] Weather related    [] [id number]    [] Other:      Comments:        [x] Next appointment was confirmed     Electronically signed by: Hugo Cardoso PTA

## 2022-10-17 ENCOUNTER — APPOINTMENT (OUTPATIENT)
Dept: PHYSICAL THERAPY | Age: 47
End: 2022-10-17
Payer: MEDICARE

## 2022-10-17 ENCOUNTER — HOSPITAL ENCOUNTER (OUTPATIENT)
Dept: PHYSICAL THERAPY | Age: 47
Setting detail: THERAPIES SERIES
Discharge: HOME | End: 2022-10-17
Payer: MEDICARE

## 2022-10-17 PROCEDURE — 97140 MANUAL THERAPY 1/> REGIONS: CPT

## 2022-10-17 PROCEDURE — 97110 THERAPEUTIC EXERCISES: CPT

## 2022-10-17 NOTE — FLOWSHEET NOTE
[facility]   [address]   Phone: [phone]   Fax: [phone]    Physical Therapy Daily Treatment Note      Date:  10/17/2022  Patient Name:  Mihir Pearson    :  1975  MRN: [id number]  Physician:      Amna Magallanes MD                Insurance: [employer],   # of visits allowed/remainin, Auth: NPRe (PATIENT USED 4 SESSION AT PT LINK IN )  Medical Diagnosis:     M51.26 (ICD-10-CM) - Lumbar disc herniation   M51.36 (ICD-10-CM) - Lumbar degenerative disc disease   Rehab Codes: M25.60 -BACK STIFFNESS, M54.50 -LBP, M62.830 -BACK SPASMS,   Onset Date: 2021                      Next 's appt.: 10/18/2022   Visit# / total visits:        Cancels/No Shows: 1/0      Subjective: PATIENT STATES HE HAS BEEN SICK BUT IS FEELING BETTER. PATIENT ALSO REPORTS TAKING A MUSCLE RELAXER YESTERDAY DUE TO INCREASED BACK PAIN.      Pain:  [x] Yes  [] No Location: entire back Pain Rating: (0-10 scale) 3-4/10  Pain altered Tx:  [x] No  [] Yes  Action:    Objective:     Precautions: NONE IDENTIFIED  Exercises:  Exercise Reps/ Time Weight/ Level Completed  Today Comments   NUSTEP 5' L5  GATHERED SUBJECTIVE INFO   MAT EXERCISES       LTR ON PHYSIO BALL 5\"X10 EA  X    BRIDGE ON  PHYSIO BALL 5\"X10 EA   NO UE STABILIZATION -SH FLEXED 90*    HS STRETCH 30\"X 2 EA  X Strap; cues for technique   Piriformis stretch 2x 30\"  X    MARCHING WITH ABD BRACE with PPT 10x2 EA RED     SLR WITH ABD BRACE 5\"X10 EA      HIP FLEX STRETCH WITH LE OVER EDGE OF TABLE 30\"X3 EA   ASSIST TO THERAPIST   CLAM SHELL  5\"X10 EA RED     PPT 15x 5\"      Open books 10x 3\"      BRIDGES with hip ABD 10X2 3\" HOLD GREEN X    QUADRUPED ALT LE 10X2      FIREHYDRANTS 10X2  X    BIRDDOGS 10X2  X    BKFO 10X EA GREEN X           STANDING EX       TRX T's 10X2      TRX ROLL OUTS 10X2      TRX SQUAT  TRX ISOMETRIC SQUAT 10X2  10\"X3      PALOFF WALK OUT WITH PRESS 5x2 BLUE X Paloff Press 15x2 BLUE X    Rows/extension double/single 10x2 BLUE X    3 way hip 15x  2#     Squat 10x2      Other: PATIENT CUES FOR POSTURE AND TO ENGAGE CORE DURING THER EX. PATIENT STARTS TALKING AND FORGETS WHAT HE IS DOING DURING AN ACTIVITY. MANUAL: HYPERVOLT TO MID AND LOW BACK IN PRONE X 8'. INTENSITY WAS MEDIUM USING THE ACCORDION ATTACHMENT. Specific Instructions for next treatment: PROGRESS PROGRAM AS PATIENT IS ABLE.  ENCOURAGE COMPLIANCE WITH HEP. Assessment: [x] Progressing toward goals. 10/17: INITIATED SESSION WITH STRETCHING AND MAT LEVEL EXERCISES FOCUSED ON CORE STRENGTHENING AND LUMBAR MOBILITY. ADDED RESISTED BKFO WITH GOOD TOLERANCE. CONTINUED WITH STANDING EXERCISES WITH FREQUENT CUES GIVEN FOR POSTURE AND TECHNIQUE. PATIENT DENIED INCREASED PAIN THROUGHOUT SESSION. [] No change. [] Other:    [x] Patient would continue to benefit from skilled physical therapy services in order to: INCREASE LUMBAR MOBILITY, [address] STG: (to be met in  12 treatments)  ? Pain: NO GREATER THAN 5/10 PERFORMING BENDING AT WORK (MET 8/25/22)  ? ROM: 50-75% NORM TRUNK MOTION FOR IMPROVED BENDING (MET 8/25/22)  ? Strength: BEGIN CORE STRENGTHENING PROGRAM (MET 8/25/22)  ? Function: IMPROVE OSWESTRY (19/50) BY 6  (IMPROVED NOT MET)  Independent with Home Exercise Programs (MET 8/25/22)  LTG: (to be met in 18 treatments)  OSWESTRY =/< 10/50  (NOT MET)  PROGRESS HEP (NOT MET)     Patient goals: RELIEF FROM BACK ACHE, LEARN STRENGTHENING EXERCISES       Pt.  Education:  [x] Yes  [] No  [] Reviewed Prior HEP/Ed    HOME Exercise PROGRAM Reps/ Time Weight/ Level Comments   PRESS [employer]  10X    QD HEP   CORNER STRETCH  30\" X3    QD HEP   PIRIFORMIS STRETCH  30\"X3    QD HEP   T BAND ROW AND PULL DOWNS  15X2 GREEN  QD HEP             Method of Education: [x] Verbal  [x] Demo  [] Written (ISSUED ANOTHER COPY 8/18)  Comprehension of Education:  [x] [employer] understanding. [x] Demonstrates understanding. [] Needs review. [] Demonstrates/verbalizes HEP/Ed previously given. Plan: [x] Continue per plan of care.    [] Other:      Treatment Charges: Mins Units   []  Modalities     [x]  Ther Exercise 34 2   [x]   Manual Therapy 8 1   []  Ther Activities     []  Aquatics     []  Neuromuscular     [] Vasocompression     [] Gait Training     [] Dry needling        [] 1 or 2 muscles        [] 3 or more muscles     []  Other     Total Treatment time 42 3       Time In: 1879    Time Out: 3838    Electronically signed by:  Catrachita Latham PTA

## 2022-10-18 ENCOUNTER — OFFICE VISIT (OUTPATIENT)
Dept: FAMILY MEDICINE CLINIC | Age: 47
End: 2022-10-18
Payer: MEDICARE

## 2022-10-18 VITALS
BODY MASS INDEX: 28.88 KG/M2 | HEIGHT: 67 IN | HEART RATE: 96 BPM | WEIGHT: 184 LBS | DIASTOLIC BLOOD PRESSURE: 82 MMHG | TEMPERATURE: 97.2 F | OXYGEN SATURATION: 98 % | SYSTOLIC BLOOD PRESSURE: 128 MMHG

## 2022-10-18 DIAGNOSIS — I10 ESSENTIAL HYPERTENSION: Primary | ICD-10-CM

## 2022-10-18 DIAGNOSIS — M54.16 RIGHT LUMBAR RADICULITIS: Chronic | ICD-10-CM

## 2022-10-18 DIAGNOSIS — Z23 NEED FOR INFLUENZA VACCINATION: ICD-10-CM

## 2022-10-18 DIAGNOSIS — R73.03 PREDIABETES: ICD-10-CM

## 2022-10-18 DIAGNOSIS — M51.26 LUMBAR DISC HERNIATION: Chronic | ICD-10-CM

## 2022-10-18 DIAGNOSIS — E78.2 MIXED HYPERLIPIDEMIA: ICD-10-CM

## 2022-10-18 DIAGNOSIS — F32.A ANXIETY AND DEPRESSION: ICD-10-CM

## 2022-10-18 DIAGNOSIS — F41.9 ANXIETY AND DEPRESSION: ICD-10-CM

## 2022-10-18 PROBLEM — U07.1 COVID-19 VIRUS INFECTION: Status: RESOLVED | Noted: 2021-01-13 | Resolved: 2022-10-18

## 2022-10-18 PROCEDURE — 99214 OFFICE O/P EST MOD 30 MIN: CPT | Performed by: FAMILY MEDICINE

## 2022-10-18 PROCEDURE — 90674 CCIIV4 VAC NO PRSV 0.5 ML IM: CPT | Performed by: FAMILY MEDICINE

## 2022-10-18 PROCEDURE — 90471 IMMUNIZATION ADMIN: CPT | Performed by: FAMILY MEDICINE

## 2022-10-18 RX ORDER — ATORVASTATIN CALCIUM 40 MG/1
40 TABLET, FILM COATED ORAL DAILY
Qty: 30 TABLET | Refills: 3 | Status: SHIPPED | OUTPATIENT
Start: 2022-10-18

## 2022-10-18 SDOH — ECONOMIC STABILITY: FOOD INSECURITY: WITHIN THE PAST 12 MONTHS, THE FOOD YOU BOUGHT JUST DIDN'T LAST AND YOU DIDN'T HAVE MONEY TO GET MORE.: NEVER TRUE

## 2022-10-18 SDOH — ECONOMIC STABILITY: FOOD INSECURITY: WITHIN THE PAST 12 MONTHS, YOU WORRIED THAT YOUR FOOD WOULD RUN OUT BEFORE YOU GOT MONEY TO BUY MORE.: NEVER TRUE

## 2022-10-18 ASSESSMENT — ENCOUNTER SYMPTOMS
RHINORRHEA: 0
RECTAL PAIN: 0
WHEEZING: 0
VOMITING: 0
STRIDOR: 0
DIARRHEA: 0
ABDOMINAL PAIN: 0
TROUBLE SWALLOWING: 0
SHORTNESS OF BREATH: 0
EYE REDNESS: 0
SINUS PRESSURE: 0
NAUSEA: 0
ABDOMINAL DISTENTION: 0
COLOR CHANGE: 0
BACK PAIN: 1
COUGH: 0
CHEST TIGHTNESS: 0
BLOOD IN STOOL: 0
SORE THROAT: 0
CONSTIPATION: 0

## 2022-10-18 ASSESSMENT — SOCIAL DETERMINANTS OF HEALTH (SDOH): HOW HARD IS IT FOR YOU TO PAY FOR THE VERY BASICS LIKE FOOD, HOUSING, MEDICAL CARE, AND HEATING?: NOT HARD AT ALL

## 2022-10-18 NOTE — PATIENT INSTRUCTIONS
New Updates for [place] [employer]/ [facility] ([facility]) DEVI    Thank you for choosing US to give you the best care! [place] ([facility]) is always trying to think of new ways to help their patients. We are asking all patients to try out the new digital registration that is now available through your [facility] account or the new DEVI, [facility] ([facility]). Via the devi you're now able to update your personal and registration information prior to your upcoming appointment. This will save you time once you arrive at the office to check-in, not to mention your information remains safe!! Many other perks come from signing up for an account, such as:  Requesting refills  Scheduling an appointment  Completing an E-Visit  Sending a message to the office/provider  Having access to your medication list  Paying your bill/copay prior to your appointment  Scheduling your yearly mammogram  Review your test results    If you are not familiar with [facility] or the [facility] ([facility]) DEVI, please ask one of us and we will be happy to answer any questions or help you set-up your account.       Your [place] office,  Svetlana

## 2022-10-18 NOTE — PROGRESS NOTES
Visit Information    Have you changed or started any medications since your last visit including any over-the-counter medicines, vitamins, or herbal medicines? no   Have you stopped taking any of your medications? Is so, why? -  no  Are you having any side effects from any of your medications? - no    Have you seen any other physician or provider since your last visit? yes -    Have you had any other diagnostic tests since your last visit?  no   Have you been seen in the emergency room and/or had an admission in a hospital since we last saw you?  no   Have you had your routine dental cleaning in the past 6 months?  no     Do you have an active MyChart account? If no, what is the barrier?   Yes    Patient Care Team:  Jamison Adams MD as PCP - General (Family Medicine)  Jamison Adams MD as PCP - [facility]    Medical History Review  Past Medical, Family, and Social History reviewed and does contribute to the patient presenting condition    Health Maintenance   Topic Date Due    Colorectal Cancer Screen  09/01/2019    COVID-19 Vaccine (3 - Booster for Lumexis series) 10/14/2021    Flu vaccine (1) 08/01/2022    Depression Monitoring  07/18/2023    A1C test (Diabetic or Prediabetic)  07/27/2023    Lipids  07/27/2027    DTaP/Tdap/Td vaccine (2 - Td or Tdap) 05/31/2028    Hepatitis C screen  Completed    HIV screen  Completed    Hepatitis A vaccine  Aged Out    Hib vaccine  Aged Out    Meningococcal (ACWY) vaccine  Aged Out    Pneumococcal 0-64 years Vaccine  Aged Out

## 2022-10-18 NOTE — PROGRESS NOTES
Chief Complaint   Patient presents with    Back Pain     PAIN SCORE: 4/10    Hypertension         [employer]  here today for follow up on chronic medical problems, go over labs and/or diagnostic studies, and medication refills. Back Pain (PAIN SCORE: 4/10) and Hypertension      HPI: Patient is scheduled for chronic medical problems and to discuss blood work. History of hypertension controlled, patient is on losartan 50 mg, denies any side effects. Patient denies any chest pain shortness of breath dyspnea on exertion. Patient reports compliance. Hyperlipidemia LDL is not at goal, uncontrolled. Patient is not on any statins, reports he tried lifestyle changes that did not work. Discussed to start on statins. The 10-year CVD risk score (LIANNA'Agoino, et al., 2008) is: 8.3%    Values used to calculate the score:      Age: 52 years      Sex: Male      Diabetic: No      Tobacco smoker: No      Systolic Blood Pressure: 630 mmHg      Is BP treated: Yes      HDL Cholesterol: 60 mg/dL      Total Cholesterol: 210 mg/dL    Patient has history of lumbar disc herniations, with radiculopathy, injury was through work. Patient wants to file disability, he has physical capacity test done that was sent with disability papers. Patient reports his work is not except for those papers and he has to find occupational physician by himself to work with . Patient follows with pain management currently and is on Lyrica and NSAIDs. Reports pain is fairly controlled. Prediabetes A1c is stable is on diet control. Patient reports he is working with his diet and physical activity. History of anxiety and depression is on Effexor reports that is helping. Patient denies any side effects reports . /82   Pulse 96   Temp 97.2 °F (36.2 °C)   Ht 5' 7\" (1.702 m)   Wt 184 lb (83.5 kg)   SpO2 98%   BMI 28.82 kg/m²    Body mass index is 28.82 kg/m².   Wt Readings from Last 3 Encounters:   10/18/22 184 lb (83.5 kg)   09/26/22 181 lb (82.1 kg)   07/27/22 181 lb (82.1 kg)        []Negative depression screening. PHQ Scores 7/18/2022 9/13/2021 2/4/2021 5/31/2018   PHQ2 Score 0 1 0 1   PHQ9 Score 1 1 0 1      [x]1-4 = Minimal depression   []5-9 = Milddepression   []10-14 = Moderate depression   []15-19 = Moderately severe depression   []20-27 = Severe depression    Discussed testing with the patient and all questions fully answered. Hospital Outpatient Visit on 07/27/2022   Component Date Value Ref Range Status    Hemoglobin A1C 07/27/2022 5.8  4.0 - 6.0 % Final    Estimated Avg Glucose 07/27/2022 120  mg/dL Final    Comment: The ADA and AACC recommend providing the estimated average glucose result to permit better   patient understanding of their HBA1c result. Cholesterol 07/27/2022 210 (A)  <200 mg/dL Final    Comment:    Cholesterol Guidelines:      <200  Desirable   200-240  Borderline      >240  Undesirable         HDL 07/27/2022 60  >40 mg/dL Final    Comment:    HDL Guidelines:    <40     Undesirable   40-59    Borderline    >59     Desirable         LDL Cholesterol 07/27/2022 140 (A)  0 - 130 mg/dL Final    Comment:    LDL Guidelines:     <100    Desirable   100-129   Near to/above Desirable   130-159   Borderline      >159   Undesirable     Direct (measured) LDL and calculated LDL are not interchangeable tests. Chol/HDL Ratio 07/27/2022 3.5  <5 Final            Triglycerides 07/27/2022 48  <150 mg/dL Final    Comment:    Triglyceride Guidelines:     <150   Desirable   150-199  Borderline   200-499  High     >499   Very high   Based on AHA Guidelines for fasting triglyceride, October 2012.          Vit D, 25-Hydroxy 07/27/2022 45.7  >29.9 ng/mL Final    Comment:    Reference Range:  Vitamin D status         Range   Deficiency              <20 ng/mL   Mild Deficiency       20-30 ng/mL   Sufficiency           ng/mL   Toxicity               >100 ng/mL      Vitamin B-12 07/27/2022 1483 (A)  232 - tablet 3    pregabalin (LYRICA) 25 MG capsule Take 1 capsule by mouth daily for 30 days. 30 capsule 1    mirtazapine (REMERON) 15 MG tablet take 1/2 tablet by mouth at bedtime      venlafaxine (EFFEXOR XR) 37.5 MG extended release capsule take 1 capsule by mouth once daily every morning      fluticasone (FLONASE) 50 MCG/ACT nasal spray 1 spray by Each Nostril route in the morning. 16 g 0    cetirizine (ZYRTEC) 10 MG tablet TAKE 1 TABLET BY MOUTH DAILY 30 tablet 3    vitamin D3 (CHOLECALCIFEROL) 25 MCG (1000 UT) TABS tablet TAKE 1 TABLET BY MOUTH DAILY 90 tablet 1    losartan (COZAAR) 50 MG tablet TAKE 1 & 1/2 TABLETS BY MOUTH DAILY 90 tablet 1    clotrimazole-betamethasone (LOTRISONE) 1-0.05 % cream Apply topically 2 times daily. 45 g 0    EPINEPHrine (EPIPEN) 0.3 MG/0.3ML SOAJ injection Inject 1 mL into the muscle as needed (Anaphylaxis) Use as directed for allergic reaction 2 each 0    Elastic Bandages & Supports (KNEE BRACE/HINGED/LARGE) MISC Use daily for knee pain 1 each 0    Ciclopirox (LOPROX) 0.77 % gel Apply in between toes twice daily. 1 Tube 3    Elastic Bandages & Supports (LUMBAR BACK BRACE/SUPPORT PAD) MISC 1 each by Does not apply route daily as needed (BACK PAIN) (Patient not taking: Reported on 10/18/2022) 1 each 0    Multiple Vitamin (MULTIVITAMIN ADULT PO) Take by mouth daily (Patient not taking: No sig reported)      ibuprofen (ADVIL;MOTRIN) 600 MG tablet Take 1 tablet by mouth every 6 hours as needed for Pain (Patient not taking: No sig reported) 20 tablet 0    Blood Pressure KIT Dx: HTN. Needs automatic blood pressure machine to monitor his blood pressure. (Patient not taking: Reported on 10/18/2022) 1 kit 0     No current facility-administered medications for this visit.              Social History     Socioeconomic History    Marital status:      Spouse name: Not on file    Number of children: Not on file    Years of education: Not on file    Highest education level: Not on file Occupational History    Not on file   Tobacco Use    Smoking status: Former     Types: Cigars     Quit date: 11/16/2018     Years since quitting: 3.9    Smokeless tobacco: Never    Tobacco comments:     quit 2016   Substance and Sexual Activity    Alcohol use: Yes     Alcohol/week: 0.0 standard drinks     Comment: beer once week    Drug use: Yes     Types: Marijuana [employer])    Sexual activity: Not on file   Other Topics Concern    Not on file   Social History Narrative    Not on file     Social Determinants of Health     Financial Resource Strain: Low Risk     Difficulty of Paying Living Expenses: Not hard at all   Food Insecurity: No Food Insecurity    Worried About Running Out of Food in the Last Year: Never true    Ran Out of Food in the Last Year: Never true   Transportation Needs: Not on file   Physical Activity: Not on file   Stress: Not on file   Social Connections: Not on file   Intimate Partner Violence: Not on file   Housing Stability: Not on file     Counseling given: Not Answered  Tobacco comments: quit 2016        Family History   Problem Relation Age of Onset    Hypertension Mother     Diabetes Mother              -rest of complaints with corresponding details per ROS    The patient's past medical, surgical, social, and family history as well as his current medications and allergies were reviewed as documented intoday's encounter. Review of Systems   Constitutional:  Positive for activity change. Negative for appetite change, fatigue, fever and unexpected weight change. HENT:  Negative for congestion, ear pain, postnasal drip, rhinorrhea, sinus pressure, sore throat and trouble swallowing. Eyes:  Negative for redness and visual disturbance. Respiratory:  Negative for cough, chest tightness, shortness of breath, wheezing and stridor. Cardiovascular:  Negative for chest pain, palpitations and leg swelling.    Gastrointestinal:  Negative for abdominal distention, abdominal pain, blood in stool, constipation, diarrhea, nausea, rectal pain and vomiting. Endocrine: Negative for polydipsia, polyphagia and polyuria. Genitourinary:  Negative for difficulty urinating, flank pain, frequency and urgency. Musculoskeletal:  Positive for arthralgias, back pain and gait problem. Negative for myalgias and neck pain. Skin:  Negative for color change, rash and wound. Allergic/Immunologic: Negative for food allergies and immunocompromised state. Neurological:  Positive for weakness and numbness. Negative for dizziness, speech difficulty, light-headedness and headaches. Psychiatric/Behavioral:  Positive for behavioral problems. Negative for agitation, decreased concentration, dysphoric mood, hallucinations, sleep disturbance and suicidal ideas. The patient is not nervous/anxious. Physical Exam  Vitals and nursing note reviewed. Constitutional:       General: He is not in acute distress. Appearance: Normal appearance. He is well-developed. He is not diaphoretic. HENT:      Head: Normocephalic and atraumatic. Nose: Nose normal.   Eyes:      General:         Right eye: No discharge. Left eye: No discharge. Extraocular Movements: Extraocular movements intact. Conjunctiva/sclera: Conjunctivae normal.      Pupils: Pupils are equal, round, and reactive to light. Neck:      Thyroid: No thyromegaly. Cardiovascular:      Rate and Rhythm: Normal rate and regular rhythm. Heart sounds: Normal heart sounds. No murmur heard. Pulmonary:      Effort: Pulmonary effort is normal. No respiratory distress. Breath sounds: Normal breath sounds. No wheezing or rhonchi. Abdominal:      General: Bowel sounds are normal. There is no distension. Palpations: Abdomen is soft. There is no mass. Tenderness: There is no abdominal tenderness. There is no guarding or rebound. Musculoskeletal:         General: No tenderness. Cervical back: Neck supple.  Spasms present. No rigidity. Decreased range of motion. Thoracic back: Spasms present. Decreased range of motion. Lumbar back: Spasms present. Decreased range of motion. Negative right straight leg raise test.   Lymphadenopathy:      Cervical: No cervical adenopathy. Skin:     Coloration: Skin is not jaundiced or pale. Findings: No bruising, erythema or rash. Neurological:      General: No focal deficit present. Mental Status: He is alert and oriented to person, place, and time. Cranial Nerves: No cranial nerve deficit. Sensory: No sensory deficit. Motor: No weakness. Coordination: Coordination normal.      Gait: Gait and tandem walk normal.      Deep Tendon Reflexes: Reflexes are normal and symmetric. Psychiatric:         Attention and Perception: Attention and perception normal. He is attentive. Mood and Affect: Mood is anxious. Mood is not depressed. Affect is not tearful. Speech: He is communicative. Speech is not rapid and pressured, delayed or slurred. Behavior: Behavior normal. Behavior is not agitated or slowed. Thought Content: Thought content normal.         Judgment: Judgment normal.           ASSESSMENT AND PLAN      1. Essential hypertension  Controlled continue losartan. Continue to monitor blood pressure. Discussed and advised  Low-salt diet increase physical activity and avoid smoking  2. Mixed hyperlipidemia  Uncontrolled start on Lipitor 40 mg. Discussed and advised low-fat diet increase physical activity  - atorvastatin (LIPITOR) 40 MG tablet; Take 1 tablet by mouth daily  Dispense: 30 tablet; Refill: 3    3. Right lumbar radiculitis  Chronic problem follows with pain management is successful. She can keep her  Plan Lyrica and NSAIDs    4. Lumbar disc herniation  Chronic problem follow-up with pain management continue Lyrica. Discussed to follow-up with occupational physician or Workmen's Comp.     5. Prediabetes  A1c is stable and encourage about diet control, weight reduction and increase physical activity. Low-carb and low-fat diet discussed  6. Anxiety and depression  Fairly stable continue Effexor. 7. Need for influenza vaccination    - Influenza, FLUCELVAX, (age 10 mo+), IM, Preservative Free, 0.5 mL      Orders Placed This Encounter   Procedures    Influenza, FLUCELVAX, (age 10 mo+), IM, Preservative Free, 0.5 mL         There are no discontinued medications. Osmel Pryor received counseling on the following healthy behaviors: nutrition, exercise, and medication adherence  Reviewed prior labs and health maintenance  Continue current medications, diet and exercise. Discussed use, benefit, and side effects of prescribed medications. Barriers to medication compliance addressed. Patient given educational materials - see patient instructions  Was a self-tracking handout given in paper form or via [employer]? Yes    Requested Prescriptions     Signed Prescriptions Disp Refills    atorvastatin (LIPITOR) 40 MG tablet 30 tablet 3     Sig: Take 1 tablet by mouth daily       All patient questions answered. Patient voiced understanding. Quality Measures    Body mass index is 28.82 kg/m². Elevated. Weight control planned discussed daily exercise regimen and Healthy diet and regular exercise. BP: 128/82 Blood pressure is Normal. Treatment plan consists of Weight Reduction, DASH Eating Plan, and No treatment change needed.     Lab Results   Component Value Date    LDLCHOLESTEROL 140 (H) 07/27/2022    (goal LDL reduction with dx if diabetes is 50% LDL reduction)      PHQ Scores 7/18/2022 9/13/2021 2/4/2021 5/31/2018   PHQ2 Score 0 1 0 1   PHQ9 Score 1 1 0 1     Interpretation of Total Score Depression Severity: 1-4 = Minimal depression, 5-9 = Mild depression, 10-14 = Moderate depression, 15-19 = Moderately severe depression, 20-27 = Severe depression    The patient'spast medical, surgical, social, and family history as well as his current medications and allergies were reviewed as documented in today's encounter. Medications, labs, diagnostic studies, consultations andfollow-up as documented in this encounter. Return in about 4 months (around 2/18/2023) for annual exam 30min. Patient wasseen with total face to face time of 35 minutes. More than 50% of this visit was counseling and education. Future Appointments   Date Time Provider Angelina Manzo   10/20/2022  8:00 AM Edna Sage, PT [facility]   10/25/2022  7:30 AM Dana Membreno, PTA [facility]   10/27/2022  8:00 AM Edna Sage, PT [facility]   11/21/2022  3:40 PM CHAIM Roy - CNP [address]   2/20/2023  7:30 AM Marlow Babinski, MD [facility]     This note was completed by using the assistance of a speech-recognition program. However, inadvertent computerized transcription errors may be present. Althoughevery effort was made to ensure accuracy, no guarantees can be provided that every mistake has been identified and corrected by editing.   Electronically signed by Marlow Babinski, MD on 10/18/2022  5:50 PM

## 2022-10-20 ENCOUNTER — HOSPITAL ENCOUNTER (OUTPATIENT)
Dept: PHYSICAL THERAPY | Age: 47
Setting detail: THERAPIES SERIES
Discharge: HOME | End: 2022-10-20
Payer: MEDICARE

## 2022-10-20 PROCEDURE — 97110 THERAPEUTIC EXERCISES: CPT

## 2022-10-20 PROCEDURE — 97140 MANUAL THERAPY 1/> REGIONS: CPT

## 2022-10-20 NOTE — FLOWSHEET NOTE
[facility]   [address]   Phone: [phone]   Fax: [phone]    Physical Therapy Daily Treatment Note      Date:  10/20/2022  Patient Name:  Char Gray    :  1975  MRN: [id number]  Physician:      Grazyna Licea MD                Insurance: [employer],   # of visits allowed/remainin, Auth: NPRe (PATIENT USED 4 SESSION AT PT LINK IN )  Medical Diagnosis:     M51.26 (ICD-10-CM) - Lumbar disc herniation   M51.36 (ICD-10-CM) - Lumbar degenerative disc disease   Rehab Codes: M25.60 -BACK STIFFNESS, M54.50 -LBP, M62.830 -BACK SPASMS,   Onset Date: 2021                      Next 's appt.: 10/18/2022   Visit# / total visits:  (CORRECTED VISIT COUNT TODAY)       Cancels/No Shows: 1/0      Subjective: PATIENT REPORTS TODAY THAT HE HAS NOT NOTICED MUCH CHANGE OVER THE PAST COUPLE OF WEEKS. REPORTED THAT HE FEELS GOOD FOLLOWING A PT SESSION, BUT PAIN QUICKLY RETURNS.      Pain:  [x] Yes  [] No Location: UPPER LUMBAR REGION   Pain Rating: (0-10 scale) 3-4/10  Pain altered Tx:  [x] No  [] Yes  Action:    Objective:     Precautions: NONE IDENTIFIED  Exercises:  Exercise Reps/ Time Weight/ Level Completed  Today Comments   NUSTEP 5' L5  GATHERED SUBJECTIVE INFO   MAT EXERCISES       LTR ON PHYSIO BALL 5\"X10 EA      BRIDGE ON  PHYSIO BALL 5\"X10 EA   NO UE STABILIZATION -SH FLEXED 90*    HS STRETCH 30\"X 2 EA   Strap; cues for technique   Piriformis stretch 2x 30\"      MARCHING WITH ABD BRACE with PPT 10x2 EA RED     SLR WITH ABD BRACE 5\"X10 EA      HIP FLEX STRETCH WITH LE OVER EDGE OF TABLE 30\"X3 EA   ASSIST TO THERAPIST   CLAM SHELL  5\"X10 EA RED     PPT 15x 5\"      Open books 10x 3\"      BRIDGES with hip ABD 10X2 3\" HOLD GREEN     PRONE ON ELBOWS 2 MIN  X    PRONE PRESS 10X2  X OVERPRESSURE L 1-3 REGION   QUADRUPED ALT LE 10X2  x    FIREHYDRANTS 10X2  X    BIRDDOGS 10X2  X    BKFO 10X EA GREEN            STANDING EX TRX T's 10X2      TRX ROLL OUTS 10X2      TRX SQUAT  TRX ISOMETRIC SQUAT 10X2  10\"X3      PALOFF WALK OUT WITH PRESS 5x2 BLUE     Paloff Press 15x2 BLUE X    Rows/extension double/single 10x2 BLUE     3 way hip 15x  2#     Squat 10x2      Other: PATIENT CUES FOR POSTURE AND TO ENGAGE CORE DURING THER EX. PATIENT STARTS TALKING AND FORGETS WHAT HE IS DOING DURING AN ACTIVITY. MANUAL: HYPERVOLT TO LOW BACK IN PRONE X 10'. INTENSITY WAS MEDIUM USING THE FLAT HEAD ATTACHMENT. Specific Instructions for next treatment: PROGRESS PROGRAM AS PATIENT IS ABLE.  ENCOURAGE COMPLIANCE WITH HEP. Assessment: [x] Progressing toward goals. 10/20: EXTENSIVE TIME SPENT AT START OF SESSION DISCUSSING PATIENTS CONTINUED PAIN AND WAYS HE IS TRYING TO MANAGE AT HOME. ADDED PRONE PRESS AND PRONE ON ELBOWS TO ASSESS PAIN RESPONSE. NOTABLE IMPROVEMENT IN PAIN WITH PRONE PRESS. ENCOURAGED PATIENT PERFORM PRONE PRESS, OR OTHER STRATEGIES DISCUSSED TODAY IN STANDING TO USE LUMBAR EXTENSION TO REDUCE PAIN. PRONE PRESS REDUCED PAIN TO 2/10, PAIN REMAINED AT LEVEL THROUGH STRENGTHENING EXERCISES PERFORMED TODAY. [] No change. [] Other:    [x] Patient would continue to benefit from skilled physical therapy services in order to: INCREASE LUMBAR MOBILITY, [address] STG: (to be met in  12 treatments)  ? Pain: NO GREATER THAN 5/10 PERFORMING BENDING AT WORK (MET 8/25/22)  ? ROM: 50-75% NORM TRUNK MOTION FOR IMPROVED BENDING (MET 8/25/22)  ? Strength: BEGIN CORE STRENGTHENING PROGRAM (MET 8/25/22)  ? Function: IMPROVE OSWESTRY (19/50) BY 6  (IMPROVED NOT MET)  Independent with Home Exercise Programs (MET 8/25/22)  LTG: (to be met in 18 treatments)  OSWESTRY =/< 10/50  (NOT MET)  PROGRESS HEP (NOT MET)     Patient goals: RELIEF FROM BACK ACHE, LEARN STRENGTHENING EXERCISES       Pt.  Education:  [x] Yes  [] No  [] Reviewed Prior HEP/Ed    HOME Exercise PROGRAM Reps/ Time Weight/ Level Comments   [employer]  10X    QD HEP   CORNER STRETCH  30\" X3    QD HEP   PIRIFORMIS STRETCH  30\"X3    QD HEP   T BAND ROW AND PULL DOWNS  15X2 GREEN  QD HEP             Method of Education: [x] Verbal  [] Demo  [] Written (ISSUED ANOTHER COPY 8/18)  Comprehension of Education: LUMBAR EXTENSION STRATEGIES TO REDUCE PAIN THROUGH DAY  [x] Verbalizes understanding. [x] Demonstrates understanding. [] Needs review. [] Demonstrates/verbalizes HEP/Ed previously given. Plan: [x] Continue per plan of care.    [] Other:      Treatment Charges: Mins Units   []  Modalities     [x]  Ther Exercise 28 2   [x]   Manual Therapy 10 1   []  Ther Activities     []  Aquatics     []  Neuromuscular     [] Vasocompression     [] Gait Training     [] Dry needling        [] 1 or 2 muscles        [] 3 or more muscles     []  Other     Total Treatment time 38 3       Time In: 8:12 AM    Time Out: 8:50 AM    Electronically signed by:  Jessica Duggan PTA

## 2022-10-25 ENCOUNTER — HOSPITAL ENCOUNTER (OUTPATIENT)
Dept: PHYSICAL THERAPY | Age: 47
Setting detail: THERAPIES SERIES
Discharge: HOME | End: 2022-10-25
Payer: MEDICARE

## 2022-10-25 PROCEDURE — 97110 THERAPEUTIC EXERCISES: CPT

## 2022-10-25 PROCEDURE — 97140 MANUAL THERAPY 1/> REGIONS: CPT

## 2022-10-25 NOTE — FLOWSHEET NOTE
[facility] Physical Therapy   [address]   Phone: [phone]   Fax: [phone]    Physical Therapy Daily Treatment Note      Date:  10/25/2022  Patient Name:  Ruiz Kern    :  1975  MRN: [id number]  Physician:      Charlotte Chaudhary MD                Insurance: [place] [employer],   # of visits allowed/remainin, Auth: NPRe (PATIENT USED 4 SESSION AT PT LINK IN )  Medical Diagnosis:     M51.26 (ICD-10-CM) - Lumbar disc herniation   M51.36 (ICD-10-CM) - Lumbar degenerative disc disease   Rehab Codes: M25.60 -BACK STIFFNESS, M54.50 -LBP, M62.830 -BACK SPASMS,   Onset Date: 2021                      Next 's appt.: 10/18/2022   Visit# / total visits:  (CORRECTED VISIT COUNT TODAY)       Cancels/No Shows: 1/0      Subjective: Patient reporting to therapy stating he is currently working on a bathroom remodel and has not noticed a change in his symptoms.     Pain:  [x] Yes  [] No Location: UPPER LUMBAR REGION   Pain Rating: (0-10 scale) 4-5/10  Pain altered Tx:  [x] No  [] Yes  Action:    Objective:     Precautions: NONE IDENTIFIED  Exercises:  Exercise Reps/ Time Weight/ Level Completed  Today Comments   NUSTEP 5' L5  GATHERED SUBJECTIVE INFO   MAT EXERCISES       LTR ON PHYSIO BALL 5\"X10 EA      BRIDGE ON  PHYSIO BALL 5\"X10 EA   NO UE STABILIZATION -SH FLEXED 90*    HS STRETCH 30\"X 2 EA   Strap; cues for technique   Piriformis stretch 2x 30\"      MARCHING WITH ABD BRACE with PPT 10x2 EA RED     SLR WITH ABD BRACE 5\"X10 EA      HIP FLEX STRETCH WITH LE OVER EDGE OF TABLE 30\"X3 EA   ASSIST TO THERAPIST   CLAM SHELL  5\"X10 EA RED     PPT 15x 5\"      Open books 10x 3\"      BRIDGES with hip ABD 10X2 3\" HOLD GREEN X 10/25 no band ed for HEP   PRONE ON ELBOWS 2 MIN      PRONE PRESS 10X2  X OVERPRESSURE L 1-3 REGION   QUADRUPED ALT LE 10X2  X    FIREHYDRANTS 10X2  X    BIRDDOGS 10X2  X    BKFO 10X EA GREEN            STANDING EX       TRX T's 10X2      TRX ROLL OUTS 10X2      TRX SQUAT  TRX ISOMETRIC SQUAT 10X2  10\"X3      PALOFF WALK OUT WITH PRESS 5x2 BLUE X    Paloff Press 15x2 BLUE X    Rows/extension double/single 10x2 BLUE X    3 way hip 15x  2#     Squat 10x2      Other:     10/25/22 Mod Oswestry Score 16/50- 32%  Access Code: [email or web address]  URL: [email or web address]  Date: 10/25/2022  Prepared by: Nikole Arias    Access Code: [email or web address]  URL: [email or web address]  Date: 10/25/2022  Prepared by: Nikole Arias    MANUAL: HYPERVOLT TO LOW BACK IN PRONE X 5'. INTENSITY WAS LOW USING THE CUSHION ATTACHMENT. GENTLE PA MOBS TO THORACIC AND LUMBAR SPINE 5' 10/25/22      Specific Instructions for next treatment: PROGRESS PROGRAM AS PATIENT IS ABLE.  ENCOURAGE COMPLIANCE WITH HEP. Assessment: [] Progressing toward goals. [] No change. [x] Other: 10/25: Patient continues to report his pain symptoms are unchanged but is able to better tolerate activity overall. Focused this session on HEP as today is patient's last visit as his POC has been completed after today. Continued with education on increasing awareness to posture and technique when performing HEP. Provided patient with HEP and TB to be able to progress exercises when he is home. Completed session with manual to thoracic and lumbar spine to decrease pain and improve tolerance to activity. [x] Patient would continue to benefit from skilled physical therapy services in order to: INCREASE LUMBAR MOBILITY, [address] STG: (to be met in  12 treatments)  ? Pain: NO GREATER THAN 5/10 PERFORMING BENDING AT WORK (MET 8/25/22)  ? ROM: 50-75% NORM TRUNK MOTION FOR IMPROVED BENDING (MET 8/25/22)  ? Strength: BEGIN CORE STRENGTHENING PROGRAM (MET 8/25/22)  ?  Function: IMPROVE OSWESTRY (19/50) BY 6  (IMPROVED NOT MET)  Independent with Home Exercise Programs (MET 8/25/22)  LTG: (to be met in 18 treatments)  OSWESTRY =/< 10/50  (NOT MET)  PROGRESS HEP (NOT MET)     Patient goals: RELIEF FROM BACK ACHE, LEARN STRENGTHENING EXERCISES       Pt. Education:  [x] Yes  [] No  [] Reviewed Prior HEP/Ed    HOME Exercise PROGRAM Reps/ Time Weight/ Level Comments   PRESS UPS  10X    QD HEP   CORNER STRETCH  30\" X3    QD HEP   PIRIFORMIS STRETCH  30\"X3    QD HEP   T BAND ROW AND PULL DOWNS  15X2 GREEN  QD HEP             Method of Education: [x] Verbal  [] Demo  [] Written (ISSUED ANOTHER COPY 8/18)  Comprehension of Education: LUMBAR EXTENSION STRATEGIES TO REDUCE PAIN THROUGH DAY  [x] Verbalizes understanding. [x] Demonstrates understanding. [] Needs review. [] Demonstrates/verbalizes HEP/Ed previously given. Plan: [x] Continue per plan of care.    [] Other:      Treatment Charges: Mins Units   []  Modalities     [x]  Ther Exercise 34 2   [x]   Manual Therapy 10 1   []  Ther Activities     []  Aquatics     []  Neuromuscular     [] Vasocompression     [] Gait Training     [] Dry needling        [] 1 or 2 muscles        [] 3 or more muscles     []  Other     Total Treatment time 44 3       Time In: 7011   Time Out: 0582    Electronically signed by:  Lucas Tuttle PTA

## 2022-10-27 ENCOUNTER — APPOINTMENT (OUTPATIENT)
Dept: PHYSICAL THERAPY | Age: 47
End: 2022-10-27
Payer: MEDICARE

## 2022-11-07 NOTE — PROGRESS NOTES
[facility]ANS  [facility]  [place]  [address]  [address] [address]  Dept: Alvin Ballard Casandra Robles (:  1975) is a 52 y.o. male. Patient is here for evaluation of the following chief complaint(s):  Chief Complaint   Patient presents with    Hypertension    Forms    Physical Therapy        SUBJECTIVE/OBJECTIVE:  HPI  Any Tamez is a 52 y.o. male patient. Patient is an established patient of Dr. Lizandro Franco. Patient has a known history of hypertension, chronic low back pain, chronic neck pain, prediabetic's, depression,. FORM NEEDS FILLED OUT/ CHRONIC PAIN ISSUES  Any Tamez is a 52 y.o. male patient . Patient did not bring the form that needs filled out today. Nor he was able to explain what the form was about. He implied that he will be providing a form to the office staff that will allow him to continue physical therapy. Patient complains of an ongoing intermittent low back pain from a previous injury. Symptoms waxes and wanes. Exacerbated with some activities He reports some achiness, spasms and stiffness across his lumbar spine. PT link and and [facility] and   Issues with work and unable to go back to work due to pain and spasm and stiffness. Wants to get b ack to PT but unable to due to pain but having a hard time due to pain and stiffness. He had an order for FUNCTIONAL CAPACITY TEST- not done. He does not want to get disability wants to continue to work  He was a self pay at one point- seemed to have issues continuing therapy due to this   He reports some re-injuries from other types of work. He did not elaborate. Evaluated by spine specialist Dr Jude Vila  Patient is an established patient of  with pain management Aleks Gonzalez CNP    HYPERTENSION  Any Tamez has a well controlled hypertension. he is currently on losartan. Patient's most recent BP in the office was stable. he reports stable BP readings at home.  Patient denies any adverse reaction to this therapy. he denies any CP, SOB, HA, or palpitations. Patient admits to exercising and adheres to low salt diet. No history of organ damage due to condition noted. Lab Results   Component Value Date/Time    CREATININE 1.03 01/16/2021 12:41 PM     PREDIABETES-stable  Patient's recent hemoglobin A1c below. Patient admits to health problems. Patient denies any polyphagia, polydipsia, polyuria. We discussed the importance lifestyle changes such as cutting down food/drinks high in carbohydrates and regular exercise to avoid any progression on this condition. We are going to continue to monitor the [facility]. Treatment diet control. Lab Results   Component Value Date    LABA1C 5.7 11/08/2022    LABA1C 5.8 07/27/2022       LOW BACK PAIN/ CERVICAL PAIN  Darlene Chaudhry is a 52 y.o. male patient . Continue to have neck pain and low back pain. As mentioned above patient continues to have pain unable to work straight. Continues to be sent home from work due to inability to continue working. Patient reports relief from pain if he continues to get physical therapy. Patient is currently under the care of of pain management declined to have steroid or lidocaine injections due to fear of side effects or adverse effects. Had a long discussion about this today. Continues to have thoracic to lumbar pain with no pain radiation patient is currently on Lyrica, ibuprofen, acetaminophen, and baclofen. Patient reports some mild somnolence especially when he takes baclofen and Lyrica together. Patient is worried about this. Patient reports mild relief unable to work well when on Lyrica and baclofen. Patient tries not to take them together and tries not to take ibuprofen often. Uses brace once in a while. Not allowing PT unless he has this medco 14 form. Urgent care  PT link and and [facility] and   Issues with work and unable to go back to work due to pain and spasm and stiffness.    Wants to get b ack to PT but unable to due to pain but having a hard time due to pain and stiffness. He had an order for FUNCTIONAL CAPACITY TEST- not done. He does not want to get disability wants to continye to work  He was a self pay. He reports some re injuries from other types of work. Impression   Straightening of lumbar lordosis. Transitional lumbar anatomy. At L4-L5, grade 1 retrolisthesis, mild bilateral neural foraminal narrowing. Left foraminal disc extrusion with superior migration contacts exiting left   L4 nerve root. At L5-S1,Grade 1 retrolisthesis and mild bilateral neural foraminal narrowing. DEPRESSION AND ANXIETY/INSOMNIA  Andrew Barnhart reported some ongoing issues with depression and anxiety. Current therapy includes Effexor,, Remeron which is working well for him. he denies adverse reaction to current therapy. he also denies suicidal/homicidal ideation, plan or intent. PHQ-2 Over the past 2 weeks, how often have you been bothered by any of the following problems? Little interest or pleasure in doing things: Not at all  Feeling down, depressed, or hopeless: Not at all  PHQ-2 Score: 0  PHQ-9 Over the past 2 weeks, how often have you been bothered by any of the following problems? Thoughts that you would be better off dead, or of hurting yourself in some way: Not at all  PHQ-9 Total Score: 0  PHQ-9 Total Score: 0   No flowsheet data found. DEPRESSION SCREENING: Negative  PHQ Scores 11/8/2022 7/18/2022 9/13/2021 2/4/2021 5/31/2018   PHQ2 Score 0 0 1 0 1   PHQ9 Score 0 1 1 0 1     VITAL SIGNS:  Vitals:    11/08/22 0841   BP: 132/89   Site: Left Upper Arm   Position: Sitting   Cuff Size: Medium Adult   Pulse: 74   SpO2: 98%   Weight: 186 lb 6.4 oz (84.6 kg)   Height: 5' 8\" (1.727 m)   Estimated body mass index is 28.34 kg/m² as calculated from the following:    Height as of this encounter: 5' 8\" (1.727 m).     Weight as of this encounter: 186 lb 6.4 oz (84.6 kg).    Review of Systems   Constitutional:  Positive for activity change. Negative for chills and fever. HENT: Negative. Respiratory: Negative. Negative for shortness of breath. Cardiovascular: Negative. Negative for chest pain and palpitations. Gastrointestinal:  Negative for abdominal pain. Endocrine: Negative. Musculoskeletal:  Positive for arthralgias, back pain, gait problem, myalgias, neck pain and neck stiffness. Neurological:  Negative for headaches. Psychiatric/Behavioral:  Positive for dysphoric mood and sleep disturbance. Negative for suicidal ideas. The patient is nervous/anxious. Physical Exam  Vitals and nursing note reviewed. HENT:      Head: Normocephalic. Nose: Nose normal.   Cardiovascular:      Rate and Rhythm: Normal rate and regular rhythm. Pulmonary:      Effort: Pulmonary effort is normal.   Abdominal:      General: Abdomen is protuberant. There is no distension. Tenderness: There is no abdominal tenderness. Musculoskeletal:      Cervical back: Normal range of motion. Thoracic back: Tenderness present. Decreased range of motion. Lumbar back: Tenderness present. Decreased range of motion. Negative right straight leg raise test and negative left straight leg raise test.   Skin:     General: Skin is warm and dry. Neurological:      Mental Status: He is alert. Psychiatric:         Mood and Affect: Mood is anxious. Speech: Speech is rapid and pressured. Thought Content: Thought content does not include suicidal ideation.        MEDICAL HISTORY      Diagnosis Date    Allergic rhinitis     Anxiety     Cervical spine pain 1/3/2019    Gastric reflux 3/30/2016    Headache(784.0)     Hepatitis     Hyperlipidemia 5/31/2018    Hypertension     IBS (irritable bowel syndrome) 12/17/2014    Insomnia     Iron deficiency anemia     Lumbar degenerative disc disease 12/13/2021    Mild single [facility] 8/2/2018    Pancreatitis     Right lumbar radiculitis 1/13/2022    Seasonal allergies       MEDICATIONS  Prior to Visit Medications    Medication Sig Taking? Authorizing Provider   atorvastatin (LIPITOR) 40 MG tablet Take 1 tablet by mouth daily Yes Marko Herrera MD   mirtazapine (REMERON) 15 MG tablet take 1/2 tablet by mouth at bedtime Yes Historical Provider, MD   venlafaxine (EFFEXOR XR) 37.5 MG extended release capsule take 1 capsule by mouth once daily every morning Yes Historical Provider, MD   fluticasone (FLONASE) 50 MCG/ACT nasal spray 1 spray by Each Nostril route in the morning. Yes Marko Herrera MD   cetirizine (ZYRTEC) 10 MG tablet TAKE 1 TABLET BY MOUTH DAILY Yes Marko Herrera MD   vitamin D3 (CHOLECALCIFEROL) 25 MCG (1000 UT) TABS tablet TAKE 1 TABLET BY MOUTH DAILY Yes Marko Herrera MD   losartan (COZAAR) 50 MG tablet TAKE 1 & 1/2 TABLETS BY MOUTH DAILY Yes Marko Herrera MD   clotrimazole-betamethasone (LOTRISONE) 1-0.05 % cream Apply topically 2 times daily. Yes Marko Herrera MD   EPINEPHrine (EPIPEN) 0.3 MG/0.3ML SOAJ injection Inject 1 mL into the muscle as needed (Anaphylaxis) Use as directed for allergic reaction Yes James Murray MD   Elastic Bandages & Supports (KNEE BRACE/HINGED/LARGE) MISC Use daily for knee pain Yes Marko Herrera MD   Ciclopirox (LOPROX) 0.77 % gel Apply in between toes twice daily. Yes Anthon Burkitt, DPM   pregabalin (LYRICA) 25 MG capsule Take 1 capsule by mouth daily for 30 days.   Teri Garcia APRN - CNP   Elastic Bandages & Supports (LUMBAR BACK BRACE/SUPPORT PAD) MISC 1 each by Does not apply route daily as needed (BACK PAIN)  Patient not taking: No sig reported  Marko Herrera MD   ibuprofen (ADVIL;MOTRIN) 600 MG tablet Take 1 tablet by mouth every 6 hours as needed for Pain  Patient not taking: No sig reported  Maryann Le MD     Controlled Substance Monitoring:  Acute and Chronic Pain Monitoring:   RX Monitoring 10/18/2022 Periodic Controlled Substance Monitoring No signs of potential drug abuse or diversion identified. ;Assessed functional status. ;Obtaining appropriate analgesic effect of treatment. Chronic Pain > 50 MEDD Considered consultation with a specialist.     ASSESSMENT/PLAN:  1. Essential hypertension  Stable  Continue current therapy. DISCUSSED AND ADVISED TO:  Cut down on your salt intake. Cut down on caffeinated drinks, sports drinks. Instructed to check BP at home regularly. Report for any chest pains, shortness of breath, headaches, and lightheadedness. Call the office if your blood pressure continue to be higher than 140/90 or 90/50.      2. Right lumbar radiculitis  Failure to Improve  Continue current therapy. DISCUSSED AND ADVISED TO:  Use heat packs 15 to 20 mins every 2-3 hours. Do some back stretches as tolerated. Refer to hand out for instructions. Call for worsening, numbness, weakness. - Amb External Referral To Physical Therapy    3. Chronic bilateral low back pain without sciatica  Failure to Improve  Continue current therapy. DISCUSSED and ADVISED TO:  Stay at a healthy weight. Continue exercises/PT  Stretch to help prevent stiffness and to prevent injury before exercise. Gentle forms of yoga help keep joints and muscles flexible. Walk instead of jog, ride a bike, swim, and water exercise. Lift weights as tolerated. strong muscles help reduce stress on joints. Take pain medicines exactly as directed and only as needed. - Amb External Referral To Physical Therapy    4. Cervical spine pain  Failure to Improve  Continue current therapy. DISCUSSED AND ADVISED TO:  Use heat packs 15 to 20 mins every 2-3 hours. Do some back stretches as tolerated. Refer to hand out for instructions. Call for worsening, numbness, weakness. 5. Prediabetes  Stable  DISCUSSED and ADVISED TO:  Decrease carbohydrates, sugary drinks, desserts   Exercise regularly, as tolerated. Try to lose weight.    - POCT glycosylated hemoglobin (Hb A1C); Future    6. Mild single current episode of major depressive disorder (HCC)  Stable  Continue current therapy. DISCUSSED and ADVISED TO:  Not stopping medication suddenly. See the specialist as discussed. Report for feelings of SI, HI, and hallucinations. Go to the ER for increasing urge to hurt yourself. 7. Hyperglycemia  Failure to Improve  Continue to evaluate  Recommend   Aic    - POCT glycosylated hemoglobin (Hb A1C); Future   On this date 11/8/2022 I have spent 35 minutes reviewing previous notes, test results and face to face with the patient discussing the diagnosis and importance of compliance with the treatment plan as well as documenting on the day of the visit. Return in about 3 months (around 2/8/2023) for Chronic conditions, Appt w/ Dr. Maral Alicea. This note was completed by using the assistance of a speech-recognition program. However, inadvertent computerized transcription errors may be present. Although every effort was made to ensure accuracy, no guarantees can be provided that every mistake has been identified and corrected by editing.   Electronically signed by CHAIM Núñez CNP on 11/6/22 at 8:24 PM EST     --CHAIM Landry CNP

## 2022-11-08 ENCOUNTER — OFFICE VISIT (OUTPATIENT)
Dept: FAMILY MEDICINE CLINIC | Age: 47
End: 2022-11-08
Payer: MEDICARE

## 2022-11-08 VITALS
HEIGHT: 68 IN | BODY MASS INDEX: 28.25 KG/M2 | OXYGEN SATURATION: 98 % | HEART RATE: 74 BPM | WEIGHT: 186.4 LBS | DIASTOLIC BLOOD PRESSURE: 89 MMHG | SYSTOLIC BLOOD PRESSURE: 132 MMHG

## 2022-11-08 DIAGNOSIS — R73.03 PREDIABETES: ICD-10-CM

## 2022-11-08 DIAGNOSIS — F32.0 MILD SINGLE CURRENT EPISODE OF MAJOR DEPRESSIVE DISORDER (HCC): ICD-10-CM

## 2022-11-08 DIAGNOSIS — R73.9 HYPERGLYCEMIA: ICD-10-CM

## 2022-11-08 DIAGNOSIS — M54.2 CERVICAL SPINE PAIN: ICD-10-CM

## 2022-11-08 DIAGNOSIS — I10 ESSENTIAL HYPERTENSION: Primary | ICD-10-CM

## 2022-11-08 DIAGNOSIS — M54.50 CHRONIC BILATERAL LOW BACK PAIN WITHOUT SCIATICA: ICD-10-CM

## 2022-11-08 DIAGNOSIS — G89.29 CHRONIC BILATERAL LOW BACK PAIN WITHOUT SCIATICA: ICD-10-CM

## 2022-11-08 DIAGNOSIS — M54.16 RIGHT LUMBAR RADICULITIS: ICD-10-CM

## 2022-11-08 PROBLEM — B00.9 HERPES SIMPLEX TYPE II INFECTION: Status: RESOLVED | Noted: 2018-05-31 | Resolved: 2022-11-08

## 2022-11-08 LAB — HBA1C MFR BLD: 5.7 %

## 2022-11-08 PROCEDURE — G8417 CALC BMI ABV UP PARAM F/U: HCPCS | Performed by: FAMILY MEDICINE

## 2022-11-08 PROCEDURE — G8427 DOCREV CUR MEDS BY ELIG CLIN: HCPCS | Performed by: FAMILY MEDICINE

## 2022-11-08 PROCEDURE — 1036F TOBACCO NON-USER: CPT | Performed by: FAMILY MEDICINE

## 2022-11-08 PROCEDURE — 83036 HEMOGLOBIN GLYCOSYLATED A1C: CPT | Performed by: FAMILY MEDICINE

## 2022-11-08 PROCEDURE — 3074F SYST BP LT 130 MM HG: CPT | Performed by: FAMILY MEDICINE

## 2022-11-08 PROCEDURE — 3078F DIAST BP <80 MM HG: CPT | Performed by: FAMILY MEDICINE

## 2022-11-08 PROCEDURE — 99214 OFFICE O/P EST MOD 30 MIN: CPT | Performed by: FAMILY MEDICINE

## 2022-11-08 PROCEDURE — G8482 FLU IMMUNIZE ORDER/ADMIN: HCPCS | Performed by: FAMILY MEDICINE

## 2022-11-08 ASSESSMENT — ENCOUNTER SYMPTOMS
SHORTNESS OF BREATH: 0
ABDOMINAL PAIN: 0
RESPIRATORY NEGATIVE: 1
BACK PAIN: 1

## 2022-11-08 ASSESSMENT — PATIENT HEALTH QUESTIONNAIRE - PHQ9
1. LITTLE INTEREST OR PLEASURE IN DOING THINGS: 0
2. FEELING DOWN, DEPRESSED OR HOPELESS: 0
SUM OF ALL RESPONSES TO PHQ9 QUESTIONS 1 & 2: 0
SUM OF ALL RESPONSES TO PHQ QUESTIONS 1-9: 0
9. THOUGHTS THAT YOU WOULD BE BETTER OFF DEAD, OR OF HURTING YOURSELF: 0
SUM OF ALL RESPONSES TO PHQ QUESTIONS 1-9: 0

## 2022-11-08 NOTE — PATIENT INSTRUCTIONS
New Updates for [facility] MyChart/ [employer] ([facility]) DEVI    Thank you for choosing [facility] to give you the best care! [employer] ([facility]) is always trying to think of new ways to help their patients. We are asking all patients to try out the new digital registration that is now available through your [facility] account or the new DEVI, [employer] ([facility]). Via the devi you're now able to update your personal and registration information prior to your upcoming appointment. This will save you time once you arrive at the office to check-in, not to mention your information remains safe!! Many other perks come from signing up for an account, such as:  Requesting refills  Scheduling an appointment  Completing an E-Visit  Sending a message to the office/provider  Having access to your medication list  Paying your bill/copay prior to your appointment  Scheduling your yearly mammogram  Review your test results    If you are not familiar with [facility] or the [employer] ([facility]) DEVI, please ask one of us and we will be happy to answer any questions or help you set-up your account.       Your [facility] office,  Svetlana

## 2022-11-10 ENCOUNTER — TELEPHONE (OUTPATIENT)
Dept: FAMILY MEDICINE CLINIC | Age: 47
End: 2022-11-10

## 2022-11-10 DIAGNOSIS — M54.50 CHRONIC BILATERAL LOW BACK PAIN WITHOUT SCIATICA: ICD-10-CM

## 2022-11-10 DIAGNOSIS — G89.29 CHRONIC BILATERAL LOW BACK PAIN WITHOUT SCIATICA: ICD-10-CM

## 2022-11-10 DIAGNOSIS — M54.2 CERVICAL SPINE PAIN: ICD-10-CM

## 2022-11-10 DIAGNOSIS — M54.16 RIGHT LUMBAR RADICULITIS: Primary | ICD-10-CM

## 2022-11-10 NOTE — TELEPHONE ENCOUNTER
Patient was seen by me for the first time on Nov 8th to fill out a form he did not have during his visit. He came in for his back pain also requesting for PT order. Form came yesterday as promised. This form will require a functional capacity testing. Which I ordered. I can not make any judgement or evaluation on this matter with a patient I only seen once since this issue has been going on for awhile. I will leave the form blank. Let the patient know that he needs to get the functional capacity testing done for this. Thanks.

## 2022-11-17 NOTE — TELEPHONE ENCOUNTER
SPOKE WITH PATIENT REGARDING FUNCTION CAPACITY TEST.  STATES HAD THIS DONE BUT PT LINK DID CALL AND HE ISN'T HAVING THIS DONE TILL 01/03/2023

## 2022-11-17 NOTE — TELEPHONE ENCOUNTER
Patient has functional capacity test done in the past also, and also he will fill his FMLA papers. Patient wants to apply for disability and his work is not excepting those tests. He has to go through [employer]. which I have discussed with patient. I am not sure what else we can do.

## 2022-11-28 ENCOUNTER — HOSPITAL ENCOUNTER (OUTPATIENT)
Dept: PAIN MANAGEMENT | Age: 47
Discharge: HOME | End: 2022-11-28
Payer: MEDICARE

## 2022-11-28 VITALS
OXYGEN SATURATION: 98 % | WEIGHT: 186 LBS | SYSTOLIC BLOOD PRESSURE: 136 MMHG | BODY MASS INDEX: 29.19 KG/M2 | RESPIRATION RATE: 16 BRPM | DIASTOLIC BLOOD PRESSURE: 87 MMHG | TEMPERATURE: 97.3 F | HEART RATE: 81 BPM | HEIGHT: 67 IN

## 2022-11-28 DIAGNOSIS — M54.16 RIGHT LUMBAR RADICULITIS: Primary | ICD-10-CM

## 2022-11-28 DIAGNOSIS — M51.36 LUMBAR DEGENERATIVE DISC DISEASE: ICD-10-CM

## 2022-11-28 DIAGNOSIS — M51.26 LUMBAR DISC HERNIATION: ICD-10-CM

## 2022-11-28 PROCEDURE — 99213 OFFICE O/P EST LOW 20 MIN: CPT

## 2022-11-28 PROCEDURE — 99214 OFFICE O/P EST MOD 30 MIN: CPT | Performed by: STUDENT IN AN ORGANIZED HEALTH CARE EDUCATION/TRAINING PROGRAM

## 2022-11-28 RX ORDER — PREGABALIN 25 MG/1
25 CAPSULE ORAL DAILY
Qty: 30 CAPSULE | Refills: 2 | Status: SHIPPED | OUTPATIENT
Start: 2022-11-28 | End: 2023-02-26

## 2022-11-28 ASSESSMENT — PAIN SCALES - GENERAL: PAINLEVEL_OUTOF10: 5

## 2022-11-28 NOTE — PROGRESS NOTES
Unremarkable. Past Medical History:   Diagnosis Date    Allergic rhinitis     Anxiety     Cervical spine pain 1/3/2019    Gastric reflux 3/30/2016    Headache(784.0)     Hepatitis     Hyperlipidemia 2018    Hypertension     IBS (irritable bowel syndrome) 2014    Insomnia     Iron deficiency anemia     Lumbar degenerative disc disease 2021    Mild single current episode of major depressive disorder ([facility] [address] 2018    Pancreatitis     Right lumbar radiculitis 2022    Seasonal allergies        Past Surgical History:   Procedure Laterality Date    ANKLE SURGERY  14    HAND SURGERY  13    URETHRA SURGERY  1985       Family History   Problem Relation Age of Onset    Hypertension Mother     Diabetes Mother        Social History     Socioeconomic History    Marital status:      Spouse name: Not on file    Number of children: Not on file    Years of education: Not on file    Highest education level: Not on file   Occupational History    Not on file   Tobacco Use    Smoking status: Former     Types: Cigars     Quit date: 2018     Years since quittin.0    Smokeless tobacco: Never    Tobacco comments:     quit    Substance and Sexual Activity    Alcohol use:  Yes     Alcohol/week: 0.0 standard drinks     Comment: beer once week    Drug use: Yes     Types: Marijuana Garon Luis)    Sexual activity: Not on file   Other Topics Concern    Not on file   Social History Narrative    Not on file     Social Determinants of Health     Financial Resource Strain: Low Risk     Difficulty of Paying Living Expenses: Not hard at all   Food Insecurity: No Food Insecurity    Worried About Running Out of Food in the Last Year: Never true    Ran Out of Food in the Last Year: Never true   Transportation Needs: Not on file   Physical Activity: Not on file   Stress: Not on file   Social Connections: Not on file   Intimate Partner Violence: Not on file   Housing Stability: Not on file Medications & Allergies:   Current Outpatient Medications   Medication Instructions    atorvastatin (LIPITOR) 40 mg, Oral, DAILY    cetirizine (ZYRTEC) 10 MG tablet TAKE 1 TABLET BY MOUTH DAILY    Ciclopirox (LOPROX) 0.77 % gel Apply in between toes twice daily. clotrimazole-betamethasone (LOTRISONE) 1-0.05 % cream Apply topically 2 times daily.     Elastic Bandages & Supports (KNEE BRACE/HINGED/LARGE) MISC Use daily for knee pain    Elastic Bandages & Supports (LUMBAR BACK BRACE/SUPPORT PAD) MISC 1 each, Does not apply, DAILY PRN    EPINEPHrine (EPIPEN) 1 mg, IntraMUSCular, PRN, Use as directed for allergic reaction    fluticasone (FLONASE) 50 MCG/ACT nasal spray 1 spray, Each Nostril, DAILY    ibuprofen (ADVIL;MOTRIN) 600 mg, Oral, EVERY 6 HOURS PRN    losartan (COZAAR) 50 MG tablet TAKE 1 & 1/2 TABLETS BY MOUTH DAILY    mirtazapine (REMERON) 15 MG tablet take 1/2 tablet by mouth at bedtime    pregabalin (LYRICA) 25 mg, Oral, DAILY    venlafaxine (EFFEXOR XR) 37.5 MG extended release capsule take 1 capsule by mouth once daily every morning    vitamin D3 (CHOLECALCIFEROL) 1,000 Units, Oral, DAILY       Allergies   Allergen Reactions    Seasonal      DUST POLLEN       Review of Systems:   Constitutional: negative for weight changes or fevers  Cardiovascular: negative for chest pain, palpitations, irregular heart beat  Respiratory: negative for dyspnea, cough, wheezing  Gastrointestinal: negative for constipation, diarrhea, nausea  Genitourinary: negative for bowel/bladder incontinence   Musculoskeletal: positive for low back pain  Neurological: positive for radicular leg pain, leg weakness or numbness/tingling  Behavioral/Psych: negative for anxiety/depression   Hematological: negative for abnormal bleeding, anticoagulation use or antiplatelet use  All other systems reviewed and are negative    OBJECTIVE:    Vitals:    11/28/22 1135   BP: 136/87   Pulse: 81   Resp: 16   Temp: 97.3 °F (36.3 °C)   SpO2: 98% PHYSICAL EXAM    GENERAL: No acute distress, pleasant, well-appearing  HEENT: Normocephalic, atraumatic, Pupils equal and round  CARDIOVASCULAR: Well perfused, No peripheral cyanosis  PULMONARY: Good chest wall excursion, breathing unlabored  PSYCH: Appropriate affect and insight, non-pressured speech  SKIN: No rashes or lesions  MUSCULOSKELETAL:  Inspection: The back and extremities are symmetric and aligned. Muscle bulk is normal in appearance. Palpation: There is tenderness to palpation along the lumbar paraspinal musculature bilaterally  Lumbar range of motion is full  NEUROMUSCULAR:  Patient ambulates unassisted  Gait is nonantalgic  Sensation to light touch is intact in lower extremities  Strength is full in lower extremities  No ankle clonus    Special Tests:  Lumbar facet loading is positive bilaterally  Seated straight leg raise is positive on right      DIAGNOSIS:    ICD-10-CM    1. Right lumbar radiculitis  M54.16       2. Lumbar disc herniation  M51.26 pregabalin (LYRICA) 25 MG capsule      3. Lumbar degenerative disc disease  M51.36 pregabalin (LYRICA) 25 MG capsule           ASSESSMENT:    Ariel Boswell is a 52 y.o.male who presents with chronic low back and right leg pain. To review, patient reports a work-related injury 1.5 years ago which he injured his low back. He is currently working with his primary care physician for possible Worker's Compensation while getting a functional capacity evaluation. The patient's history and physical examination are consistent with lumbar radiculopathy as he has pain starting in his low back radiating down into his right leg. Patient is doing well with his Lyrica. He has been offered lumbar epidural steroid injection however he defers at this time. He has continued with his physical therapy. Neurologically, it appears the patient has full strength and normal sensation. There is no evidence of myelopathy on examination.  There are no red flags in the patient's history. PLAN:  Medications: For nonopioid therapy, the following medications were prescribed:    -Continue Lyrica 25 mg daily, refilled  -Continue medication prescribed by primary care physician    Opioid therapy:  -Not indicated    Interventions:  -Offered lumbar epidural steroid injection, patient deferred    Imaging:  -Reviewed lumbar MRI with patient in room    Behavioral Therapies:  -Continue daily stretching and home exercise program    Referrals:  -None    Follow-up Plan:  -3 months with NP    Patient was offered intervention where appropriate. Multi-modal Pain Therapy: The patient was explicitly considered for multimodal and interdisciplinary therapy. Non-opioid and non-pharmacological opportunities to enhance analgesia and quality of life have been and will continue to be pursued. Albertina Howard DO  Interventional Pain Management/PM&R   [facility]    32 minutes was utilized for this patient encounter including face-to-face time with patient, reviewing previous imaging, injections, and medical history while reviewing plan of care with patient which included injection therapy, physical therapy, and medication management. Orders Placed This Encounter    pregabalin (LYRICA) 25 MG capsule     Sig: Take 1 capsule by mouth daily for 90 days.      Dispense:  30 capsule     Refill:  2

## 2022-12-05 DIAGNOSIS — I10 ESSENTIAL HYPERTENSION: ICD-10-CM

## 2022-12-05 RX ORDER — LOSARTAN POTASSIUM 50 MG/1
TABLET ORAL
Qty: 180 TABLET | Refills: 1 | Status: SHIPPED | OUTPATIENT
Start: 2022-12-05

## 2022-12-05 NOTE — TELEPHONE ENCOUNTER
Please Approve or Refuse.   Send to Pharmacy per Pt's Request:      Next Visit Date:  2/8/2023   Last Visit Date: 11/8/2022    Hemoglobin A1C (%)   Date Value   11/08/2022 5.7   07/27/2022 5.8   12/13/2021 5.1             ( goal A1C is < 7)   BP Readings from Last 3 Encounters:   11/28/22 136/87   11/08/22 132/89   10/18/22 128/82          (goal 120/80)  BUN   Date Value Ref Range Status   01/16/2021 11 6 - 20 mg/dL Final     Creatinine   Date Value Ref Range Status   01/16/2021 1.03 0.70 - 1.20 mg/dL Final     Potassium   Date Value Ref Range Status   01/16/2021 3.9 3.7 - 5.3 mmol/L Final

## 2022-12-12 ENCOUNTER — TELEPHONE (OUTPATIENT)
Dept: FAMILY MEDICINE CLINIC | Age: 47
End: 2022-12-12

## 2022-12-12 NOTE — TELEPHONE ENCOUNTER
Patient called in regard to his paperwork for evaluation for workers comp, he was calling to touch base with information and on completion of forms. He also asked if he can speak directly with  in regard to this paperwork. Nisha Lagos our  is also looking into forms will give to .

## 2022-12-13 ENCOUNTER — TELEPHONE (OUTPATIENT)
Dept: PAIN MANAGEMENT | Age: 47
End: 2022-12-13

## 2022-12-13 DIAGNOSIS — M51.36 LUMBAR DEGENERATIVE DISC DISEASE: ICD-10-CM

## 2022-12-13 DIAGNOSIS — M54.16 RIGHT LUMBAR RADICULITIS: Primary | ICD-10-CM

## 2022-12-13 DIAGNOSIS — M51.26 LUMBAR DISC HERNIATION: ICD-10-CM

## 2022-12-13 RX ORDER — PREGABALIN 25 MG/1
25 CAPSULE ORAL DAILY
Qty: 30 CAPSULE | Refills: 2 | Status: SHIPPED | OUTPATIENT
Start: 2022-12-13 | End: 2023-03-13

## 2022-12-13 NOTE — TELEPHONE ENCOUNTER
Patient calling in stating his car is down and he needs to have his refill of Lyrica sent to [facility] so he can walk there and pick it up. Please advise. Thank you!

## 2022-12-13 NOTE — TELEPHONE ENCOUNTER
Bryanna Banuelos will contact pt to make aware forms are completed and ready to be picked up, placed in brown folder.

## 2022-12-20 ENCOUNTER — TELEPHONE (OUTPATIENT)
Dept: PAIN MANAGEMENT | Age: 47
End: 2022-12-20

## 2022-12-30 DIAGNOSIS — I10 ESSENTIAL HYPERTENSION: ICD-10-CM

## 2022-12-30 DIAGNOSIS — J30.2 SEASONAL ALLERGIES: ICD-10-CM

## 2022-12-30 RX ORDER — CETIRIZINE HYDROCHLORIDE 10 MG/1
TABLET ORAL
Qty: 30 TABLET | Refills: 3 | Status: SHIPPED | OUTPATIENT
Start: 2022-12-30

## 2022-12-30 RX ORDER — FLUTICASONE PROPIONATE 50 MCG
1 SPRAY, SUSPENSION (ML) NASAL DAILY
Qty: 16 G | Refills: 0 | Status: SHIPPED | OUTPATIENT
Start: 2022-12-30

## 2022-12-30 RX ORDER — LOSARTAN POTASSIUM 50 MG/1
TABLET ORAL
Qty: 180 TABLET | Refills: 1 | Status: SHIPPED | OUTPATIENT
Start: 2022-12-30

## 2022-12-30 NOTE — TELEPHONE ENCOUNTER
Pt is also inquiring if it is okay for him to take OTC melatonin and if so what MG should he take. Please Approve or Refuse.   Send to Pharmacy per Pt's Request: [facility]     Next Visit Date:  2/8/2023   Last Visit Date: 11/8/2022    Hemoglobin A1C (%)   Date Value   11/08/2022 5.7   07/27/2022 5.8   12/13/2021 5.1             ( goal A1C is < 7)   BP Readings from Last 3 Encounters:   11/28/22 136/87   11/08/22 132/89   10/18/22 128/82          (goal 120/80)  BUN   Date Value Ref Range Status   01/16/2021 11 6 - 20 mg/dL Final     Creatinine   Date Value Ref Range Status   01/16/2021 1.03 0.70 - 1.20 mg/dL Final     Potassium   Date Value Ref Range Status   01/16/2021 3.9 3.7 - 5.3 mmol/L Final

## 2023-01-03 ENCOUNTER — HOSPITAL ENCOUNTER (OUTPATIENT)
Dept: PHYSICAL THERAPY | Facility: CLINIC | Age: 48
Setting detail: THERAPIES SERIES
Discharge: HOME | End: 2023-01-03
Payer: MEDICARE

## 2023-01-03 PROCEDURE — 97750 PHYSICAL PERFORMANCE TEST: CPT

## 2023-02-03 ENCOUNTER — TELEPHONE (OUTPATIENT)
Dept: FAMILY MEDICINE CLINIC | Age: 48
End: 2023-02-03

## 2023-02-03 DIAGNOSIS — I10 ESSENTIAL HYPERTENSION: ICD-10-CM

## 2023-02-03 DIAGNOSIS — J30.2 SEASONAL ALLERGIES: ICD-10-CM

## 2023-02-03 RX ORDER — LOSARTAN POTASSIUM 50 MG/1
TABLET ORAL
Qty: 180 TABLET | Refills: 1 | Status: SHIPPED | OUTPATIENT
Start: 2023-02-03

## 2023-02-03 NOTE — TELEPHONE ENCOUNTER
----- Message from Jackie Parmar sent at 2/3/2023 10:35 AM EST -----  Subject: Refill Request    QUESTIONS  Name of Medication? losartan (COZAAR) 50 MG tablet  Patient-reported dosage and instructions? 50 mg, 1 1/2 pills once per day  How many days do you have left? Unknown  Preferred Pharmacy? [facility]  Pharmacy phone number (if available)? [phone]  Additional Information for Provider? 90 days if possible  ---------------------------------------------------------------------------  --------------  CALL BACK INFO  What is the best way for the office to contact you? OK to leave message on   voicemail,OK to respond with electronic message via [employer] portal (only   for patients who have registered [employer] account)  Preferred Call Back Phone Number? [phone]  ---------------------------------------------------------------------------  --------------  SCRIPT ANSWERS  Relationship to Patient?  Self

## 2023-02-03 NOTE — TELEPHONE ENCOUNTER
Please Approve or Refuse.   Send to Pharmacy per Pt's Request: [facility]      Next Visit Date:  2/20/2023   Last Visit Date: 11/8/2022    Hemoglobin A1C (%)   Date Value   11/08/2022 5.7   07/27/2022 5.8   12/13/2021 5.1             ( goal A1C is < 7)   BP Readings from Last 3 Encounters:   11/28/22 136/87   11/08/22 132/89   10/18/22 128/82          (goal 120/80)  BUN   Date Value Ref Range Status   01/16/2021 11 6 - 20 mg/dL Final     Creatinine   Date Value Ref Range Status   01/16/2021 1.03 0.70 - 1.20 mg/dL Final     Potassium   Date Value Ref Range Status   01/16/2021 3.9 3.7 - 5.3 mmol/L Final

## 2023-02-03 NOTE — TELEPHONE ENCOUNTER
Form[employer] message sent to reschedule appt appts on 2/8/23 & 2/20/23 due to change in providers schedule.

## 2023-02-07 ENCOUNTER — TELEPHONE (OUTPATIENT)
Dept: FAMILY MEDICINE CLINIC | Age: 48
End: 2023-02-07

## 2023-02-07 ENCOUNTER — TELEMEDICINE (OUTPATIENT)
Dept: FAMILY MEDICINE CLINIC | Age: 48
End: 2023-02-07
Payer: MEDICAID

## 2023-02-07 DIAGNOSIS — E78.2 MIXED HYPERLIPIDEMIA: ICD-10-CM

## 2023-02-07 DIAGNOSIS — M51.26 LUMBAR DISC HERNIATION: ICD-10-CM

## 2023-02-07 DIAGNOSIS — F32.0 MILD SINGLE CURRENT EPISODE OF MAJOR DEPRESSIVE DISORDER (HCC): ICD-10-CM

## 2023-02-07 DIAGNOSIS — G89.29 ACUTE EXACERBATION OF CHRONIC LOW BACK PAIN: Primary | ICD-10-CM

## 2023-02-07 DIAGNOSIS — M54.50 ACUTE EXACERBATION OF CHRONIC LOW BACK PAIN: Primary | ICD-10-CM

## 2023-02-07 DIAGNOSIS — R73.03 PREDIABETES: ICD-10-CM

## 2023-02-07 PROCEDURE — 99214 OFFICE O/P EST MOD 30 MIN: CPT | Performed by: FAMILY MEDICINE

## 2023-02-07 RX ORDER — FLUTICASONE PROPIONATE 50 MCG
SPRAY, SUSPENSION (ML) NASAL
Qty: 16 G | Refills: 0 | Status: SHIPPED | OUTPATIENT
Start: 2023-02-07

## 2023-02-07 RX ORDER — PREGABALIN 25 MG/1
25 CAPSULE ORAL DAILY
COMMUNITY

## 2023-02-07 ASSESSMENT — ENCOUNTER SYMPTOMS
CHEST TIGHTNESS: 0
COLOR CHANGE: 0
DIARRHEA: 0
VOMITING: 0
TROUBLE SWALLOWING: 0
ABDOMINAL DISTENTION: 0
ABDOMINAL PAIN: 0
COUGH: 0
SINUS PRESSURE: 0
BACK PAIN: 1
RHINORRHEA: 0
BLOOD IN STOOL: 0
RECTAL PAIN: 0
SORE THROAT: 0
WHEEZING: 0
NAUSEA: 0
CONSTIPATION: 0
EYE REDNESS: 0
STRIDOR: 0
SHORTNESS OF BREATH: 0

## 2023-02-07 NOTE — PROGRESS NOTES
2023    TELEHEALTH EVALUATION -- Audio/Visual (During [facility] public health emergency)      Dayana Stephen (:  1975) is a 52 y.o. male,Established patient, here for evaluation of the following chief complaint(s): Lower Back Pain (Went to ER )        ASSESSMENT/PLAN:    1. Acute exacerbation of chronic low back pain  Chronic problem recent worsening, new back brace ordered, continue physical therapy, follow-up with pain management, will try to get functional capacity report. -     Elastic Bandages & Supports (LUMBAR BACK BRACE/SUPPORT PAD) MISC; Disp-1 each, R-0, PrintPlease provide orthonyx back brace works better for patient needs xtra - large size . 2. Lumbar disc herniation  Chronic problem recent worsening follow-up with pain management continue current treatment  -     Elastic Bandages & Supports (LUMBAR BACK BRACE/SUPPORT PAD) MISC; Disp-1 each, R-0, PrintPlease provide orthonyx back brace works better for patient needs xtra - large size . 3. Prediabetes  Stable and improving  4. Mixed hyperlipidemia  Stop statins, as patient has stopped on his own, will continue to monitor , and increased about weight reduction. 5. Mild single current episode of major depressive disorder ([facility] [address]  Fairly stable follow-up with psychiatrist continue Celexa. Discontinue Remeron and Effexor. Melany Jackson received counseling on the following healthy behaviors: nutrition, exercise, and medication adherence  Reviewed prior labs and health maintenance  Discussed use, benefit, and side effects of prescribed medications. Barriers to medication compliance addressed. Patient given educational materials - see patient instructions  All patient questions answered. Patient voiced understanding. The patient's past medical,surgical, social, and family history as well as his current medications and allergies were reviewed as documented in today's encounter.     Medications, labs, diagnostic studies, consultations and follow-up as documented in this encounter. No follow-ups on file. Please get report on FCT , we have in system, need final report . Future Appointments   Date Time Provider Angelina Manzo   3/8/2023  2:15 PM DO Miriam Sandoval         SUBJECTIVE/OBJECTIVE:  Meri Rodriguez (:  1975) has requested an audio/video evaluation for the following concern(s):Lower Back Pain (Went to ER )    Patient is scheduled, for urgent care follow-up. Patient reports he was at [address] [place] [address] urgent [facility] in the beginning of this month for low back pain, could not find notes in the epic. Patient reports he had acute exacerbation of back pain which was not improving. Patient reports he restarted physical therapy, patient reports he is currently doing physical therapy at [facility] and that has improved his pain. Patient reports he was given a back brace to use, that is helping and he wants a bigger size because that back brace is small for him. 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 Currently patient follows with pain management and is on Lyrica 25 mg daily. Patient has functional capacity test done, results are in epic but they did not provide the final report. Patient reports trying to file disability. Hyperlipidemia, LDL was 140 patient is stopping statins reports he does not need that. Patient reports he has changed his diet.     Depression patient is taking Celexa he has stopped Remeron and Effexor which was provided by psychiatrist.    [facility] Scores 2022   PHQ2 Score 0 0 1 0 1   PHQ9 Score 0 1 1 0 1     Interpretation of Total Score Depression Severity: 1-4 = Minimal depression, 5-9 = Mild depression, 10-14 = Moderate depression, 15-19 = Moderately severe depression, 20-27 = Severe depression    Ht Readings from Last 3 Encounters:   22 5' 7\" (1.702 m) 11/08/22 5' 8\" (1.727 m)   10/18/22 5' 7\" (1.702 m)     Wt Readings from Last 3 Encounters:   11/28/22 186 lb (84.4 kg)   11/08/22 186 lb 6.4 oz (84.6 kg)   10/18/22 184 lb (83.5 kg)         Review of Systems   Constitutional:  Positive for activity change. Negative for appetite change, fatigue, fever and unexpected weight change. HENT:  Negative for congestion, ear pain, postnasal drip, rhinorrhea, sinus pressure, sore throat and trouble swallowing. Eyes:  Negative for redness and visual disturbance. Respiratory:  Negative for cough, chest tightness, shortness of breath, wheezing and stridor. Cardiovascular:  Negative for chest pain, palpitations and leg swelling. Gastrointestinal:  Negative for abdominal distention, abdominal pain, blood in stool, constipation, diarrhea, nausea, rectal pain and vomiting. Endocrine: Negative for polyuria. Genitourinary:  Negative for difficulty urinating, flank pain, frequency and urgency. Musculoskeletal:  Positive for arthralgias, back pain, gait problem and joint swelling. Negative for myalgias and neck pain. Skin:  Negative for color change, rash and wound. Allergic/Immunologic: Negative for food allergies and immunocompromised state. Neurological:  Positive for weakness and numbness. Negative for dizziness, speech difficulty, light-headedness and headaches. Psychiatric/Behavioral:  Positive for dysphoric mood. Negative for agitation, behavioral problems, decreased concentration, hallucinations, sleep disturbance and suicidal ideas. The patient is nervous/anxious. Prior to Visit Medications    Medication Sig Taking? Authorizing Provider   pregabalin (LYRICA) 25 MG capsule Take 25 mg by mouth daily. Yes Historical Provider, MD   Elastic Bandages & Supports (LUMBAR BACK BRACE/SUPPORT PAD) MISC Please provide orthonyx back brace works better for patient needs xtra - large size .  Yes Emily Walter MD   losartan (COZAAR) 50 MG tablet TAKE 1 & 1/2 TABLETS BY MOUTH DAILY Yes Emelia Leggett MD   cetirizine (ZYRTEC) 10 MG tablet TAKE 1 TABLET BY MOUTH DAILY Yes Emelia Leggett MD   fluticasone (FLONASE) 50 MCG/ACT nasal spray 1 spray by Each Nostril route daily Yes Emelia Leggett MD   atorvastatin (LIPITOR) 40 MG tablet Take 1 tablet by mouth daily  Patient not taking: Reported on 2023  Emelia Leggett MD   Elastic Bandages & Supports (LUMBAR BACK BRACE/SUPPORT PAD) MISC 1 each by Does not apply route daily as needed (BACK PAIN)  Patient not taking: No sig reported  Emelia Leggett MD   vitamin D3 (CHOLECALCIFEROL) 25 MCG (1000 UT) TABS tablet TAKE 1 TABLET BY MOUTH DAILY  Emelia Leggett MD   ibuprofen (ADVIL;MOTRIN) 600 MG tablet Take 1 tablet by mouth every 6 hours as needed for Pain  Patient not taking: No sig reported  Krystina Sheldon MD   clotrimazole-betamethasone (LOTRISONE) 1-0.05 % cream Apply topically 2 times daily. Patient not taking: Reported on 2023  Emelia Leggett MD   EPINEPHrine (EPIPEN) 0.3 MG/0.3ML SOAJ injection Inject 1 mL into the muscle as needed (Anaphylaxis) Use as directed for allergic reaction  Annette Heredia MD   Elastic Bandages & Supports (KNEE BRACE/HINGED/LARGE) MISC Use daily for knee pain  Emelia Leggett MD   Ciclopirox (LOPROX) 0.77 % gel Apply in between toes twice daily. Patient not taking: Reported on 2023  Donovan Belle DPM       Social History     Tobacco Use    Smoking status: Former     Types: Cigars     Quit date: 2018     Years since quittin.2    Smokeless tobacco: Never    Tobacco comments:     quit 2016   Substance Use Topics    Alcohol use:  Yes     Alcohol/week: 0.0 standard drinks     Comment: beer once week    Drug use: Yes     Types: Marijuana Pearly Reyna)          PHYSICAL EXAMINATION:    Vital Signs: (As obtained by patient/caregiver or practitioner observation)  -vital signs stable and within normal limits except   Patient-Reported Vitals 2022   Patient-Reported Weight 174.8 Patient-Reported Height 5' 7\"           Intensity of pain is: 7        Constitutional: [x] Appears well-developed and well-nourished [x] No apparent distress      [] Abnormal-       Mental status  [x] Alert and awake  [x] Oriented to person/place/time [x]Able to follow commands      Eyes:  EOM    [x]  Normal  [] Abnormal-  Sclera  [x]  Normal  [] Abnormal -         Discharge [x]  None visible  [] Abnormal -    HENT:   [x] Normocephalic, atraumatic. [] Abnormal   [x] Mouth/Throat: Mucous membranes are moist.     External Ears [x] Normal  [] Abnormal-     Neck: [x] No visualized mass     Pulmonary/Chest: [x] Respiratory effort normal.  [x] No visualized signs of difficulty breathing or respiratory distress        [] Abnormal        Musculoskeletal:   [x] Normal gait with no signs of ataxia         [x] Normal range of motion of neck        [] Abnormal-       Neurological:        [x] No Facial Asymmetry (Cranial nerve 7 motor function) (limited exam to video visit)          [x] No gaze palsy        [] Abnormal-            Skin:        [x] No significant exanthematous lesions or discoloration noted on facial skin         [] Abnormal-            Psychiatric:      [x] No Hallucinations       [x]Mood is normal      [x]Behavior is normal      [x]Judgment is normal      [x]Thought content is normal       [] Abnormal-     Other pertinent observable physical exam findings-     Due to this being a TeleHealth encounter, evaluation of the following organ systems is limited: Vitals/Constitutional/EENT/Resp/CV/GI//MS/Neuro/Skin/Heme-Lymph-Imm. I personally reviewed most recent labs reviewed with the patient and all questions fully answered.      Otherwise labs within normal limits        Lab Results   Component Value Date    WBC 3.8 12/13/2021    HGB 14.2 12/13/2021    HCT 42.8 12/13/2021    MCV 90.5 12/13/2021     12/13/2021       Lab Results   Component Value Date/Time     01/16/2021 12:41 PM    K 3.9 01/16/2021 12:41 PM     01/16/2021 12:41 PM    CO2 28 01/16/2021 12:41 PM    BUN 11 01/16/2021 12:41 PM    CREATININE 1.03 01/16/2021 12:41 PM    GLUCOSE 92 01/16/2021 12:41 PM    GLUCOSE 79 09/30/2011 11:45 AM    CALCIUM 9.2 01/16/2021 12:41 PM        Lab Results   Component Value Date    ALT 21 01/16/2021    AST 19 01/16/2021    ALKPHOS 63 01/16/2021    BILITOT 0.25 (L) 01/16/2021       Lab Results   Component Value Date    TSH 1.09 07/27/2022       Lab Results   Component Value Date    CHOL 210 (H) 07/27/2022    CHOL 174 01/16/2021    CHOL 212 (H) 01/21/2020     Lab Results   Component Value Date    TRIG 48 07/27/2022    TRIG 47 01/16/2021    TRIG 55 01/21/2020     Lab Results   Component Value Date    HDL 60 07/27/2022    HDL 44 01/16/2021    HDL 63 01/21/2020     Lab Results   Component Value Date    LDLCHOLESTEROL 140 (H) 07/27/2022    LDLCHOLESTEROL 121 01/16/2021    LDLCHOLESTEROL 138 (H) 01/21/2020       Lab Results   Component Value Date    CHOLHDLRATIO 3.5 07/27/2022    CHOLHDLRATIO 4.0 01/16/2021    CHOLHDLRATIO 3.4 01/21/2020       Lab Results   Component Value Date    LABA1C 5.7 11/08/2022    LABA1C 5.8 07/27/2022    LABA1C 5.1 12/13/2021         Lab Results   Component Value Date    NOAQDFGO35 1483 (H) 07/27/2022       Lab Results   Component Value Date    VITD25 45.7 07/27/2022       Orders Placed This Encounter   Medications    Elastic Bandages & Supports (LUMBAR BACK BRACE/SUPPORT PAD) MISC     Sig: Please provide orthonyx back brace works better for patient needs xtra - large size . Dispense:  1 each     Refill:  0         No orders of the defined types were placed in this encounter.       Medications Discontinued During This Encounter   Medication Reason    venlafaxine (EFFEXOR XR) 37.5 MG extended release capsule Alternate therapy    pregabalin (LYRICA) 25 MG capsule Alternate therapy    mirtazapine (REMERON) 15 MG tablet Alternate therapy            Patient was alone  yes    Abdirashid Maynard Geovany, was evaluated through a synchronous (real-time) audio-video encounter. The patient (or guardian if applicable) is aware that this is a billable service, which includes applicable co-pays. This Virtual Visit was conducted with patient's (and/or legal guardian's) consent. The visit was conducted pursuant to the emergency declaration under the [address] [place] [address], [address] [place] [address] authority and the [employer] and [employer] General Act. Patient identification was verified, and a caregiver was present when appropriate. The patient was located at Home: [address] [place] [address]  Provider was located at [facility] ([place] [address]): [facility]  [address] [place] [address]         Total time spent for this encounter:  27    --Alem Gonsalez MD on 2/7/2023 at 1:25 PM    An electronic signature was used to authenticate this note.

## 2023-02-07 NOTE — PATIENT INSTRUCTIONS
New Updates for [id number]/ [employer] ([facility]) DEVI    Thank you for choosing US to give you the best care! [employer] ([facility]) is always trying to think of new ways to help their patients. We are asking all patients to try out the new digital registration that is now available through your [facility] account or the new DEVI, [employer] ([facility]). Via the devi you're now able to update your personal and registration information prior to your upcoming appointment. This will save you time once you arrive at the office to check-in, not to mention your information remains safe!! Many other perks come from signing up for an account, such as:  Requesting refills  Scheduling an appointment  Completing an E-Visit  Sending a message to the office/provider  Having access to your medication list  Paying your bill/copay prior to your appointment  Scheduling your yearly mammogram  Review your test results    If you are not familiar with [facility] or the [employer] ([facility]) DEVI, please ask one of us and we will be happy to answer any questions or help you set-up your account.       Your [address] [place] office,  Svetlana

## 2023-02-07 NOTE — TELEPHONE ENCOUNTER
[facility] spoke Yasmin and confirmed the final report for the patient FCT is in media dated 1/3/2023 - FCE summary report 1.3.23.

## 2023-03-16 DIAGNOSIS — J30.2 SEASONAL ALLERGIES: ICD-10-CM

## 2023-03-16 DIAGNOSIS — I10 ESSENTIAL HYPERTENSION: ICD-10-CM

## 2023-03-16 NOTE — TELEPHONE ENCOUNTER
Please Approve or Refuse. Send to Pharmacy per Pt's Request: [facility]     Pt is also stating that he needs refills on CELEXIA 10 MG DAILY. STATED THAT [facility] FILLS SCRIPT.           Next Visit Date:  Visit date not found   Last Visit Date: 2/7/2023    Hemoglobin A1C (%)   Date Value   11/08/2022 5.7   07/27/2022 5.8   12/13/2021 5.1             ( goal A1C is < 7)   BP Readings from Last 3 Encounters:   11/28/22 136/87   11/08/22 132/89   10/18/22 128/82          (goal 120/80)  BUN   Date Value Ref Range Status   01/16/2021 11 6 - 20 mg/dL Final     Creatinine   Date Value Ref Range Status   01/16/2021 1.03 0.70 - 1.20 mg/dL Final     Potassium   Date Value Ref Range Status   01/16/2021 3.9 3.7 - 5.3 mmol/L Final

## 2023-03-17 RX ORDER — LOSARTAN POTASSIUM 50 MG/1
TABLET ORAL
Qty: 180 TABLET | Refills: 1 | Status: SHIPPED | OUTPATIENT
Start: 2023-03-17

## 2023-03-17 RX ORDER — CETIRIZINE HYDROCHLORIDE 10 MG/1
TABLET ORAL
Qty: 30 TABLET | Refills: 3 | Status: SHIPPED | OUTPATIENT
Start: 2023-03-17

## 2023-03-20 ENCOUNTER — HOSPITAL ENCOUNTER (OUTPATIENT)
Dept: PAIN MANAGEMENT | Age: 48
Discharge: HOME | End: 2023-03-20
Payer: MEDICAID

## 2023-03-20 VITALS
WEIGHT: 184.8 LBS | SYSTOLIC BLOOD PRESSURE: 139 MMHG | OXYGEN SATURATION: 97 % | RESPIRATION RATE: 20 BRPM | DIASTOLIC BLOOD PRESSURE: 83 MMHG | BODY MASS INDEX: 29 KG/M2 | TEMPERATURE: 97.3 F | HEIGHT: 67 IN | HEART RATE: 75 BPM

## 2023-03-20 DIAGNOSIS — M51.36 LUMBAR DEGENERATIVE DISC DISEASE: ICD-10-CM

## 2023-03-20 DIAGNOSIS — G89.29 CHRONIC BILATERAL LOW BACK PAIN WITHOUT SCIATICA: ICD-10-CM

## 2023-03-20 DIAGNOSIS — M54.50 CHRONIC BILATERAL LOW BACK PAIN WITHOUT SCIATICA: ICD-10-CM

## 2023-03-20 DIAGNOSIS — M47.817 LUMBOSACRAL SPONDYLOSIS WITHOUT MYELOPATHY: ICD-10-CM

## 2023-03-20 DIAGNOSIS — M54.16 LUMBAR RADICULOPATHY: Primary | ICD-10-CM

## 2023-03-20 PROCEDURE — 99213 OFFICE O/P EST LOW 20 MIN: CPT

## 2023-03-20 RX ORDER — PREGABALIN 25 MG/1
25 CAPSULE ORAL DAILY
Qty: 30 CAPSULE | Refills: 2 | Status: SHIPPED | OUTPATIENT
Start: 2023-03-20 | End: 2023-06-18

## 2023-03-20 NOTE — PROGRESS NOTES
03/20/23 0814   BP: 139/83   Pulse: 75   Resp: 20   Temp: 97.3 °F (36.3 °C)   SpO2: 97%         PHYSICAL EXAM    GENERAL: No acute distress, pleasant, well-appearing  HEENT: Normocephalic, atraumatic, Pupils equal and round  CARDIOVASCULAR: Well perfused, No peripheral cyanosis  PULMONARY: Good chest wall excursion, breathing unlabored  PSYCH: Appropriate affect and insight, non-pressured speech  SKIN: No rashes or lesions  MUSCULOSKELETAL:  Inspection: The back and extremities are symmetric and aligned. Muscle bulk is normal in appearance. Palpation: There is tenderness to palpation along the lumbar paraspinal musculature bilaterally  Lumbar range of motion is full  NEUROMUSCULAR:  Patient ambulates unassisted  Gait is nonantalgic  Sensation to light touch is intact in lower extremities  Strength is full in lower extremities  No ankle clonus    Special Tests:  Lumbar facet loading is positive bilaterally  Seated straight leg raise is positive on right      DIAGNOSIS:    ICD-10-CM    1. Lumbar radiculopathy  M54.16 pregabalin (LYRICA) 25 MG capsule      2. Lumbar degenerative disc disease  M51.36       3. Chronic bilateral low back pain without sciatica  M54.50     G89.29       4. Lumbosacral spondylosis without myelopathy  M47.817              ASSESSMENT:    Rosemarie Shah is a 50 y.o.male who presents with chronic low back and right leg pain. To review, patient reports a work-related injury 1.5 years ago which he injured his low back. He is currently working with his primary care physician for possible Worker's Compensation while getting a functional capacity evaluation. The patient's history and physical examination are consistent with lumbar radiculopathy as he has pain starting in his low back radiating down into his right leg. Patient is doing well with his Lyrica. He has been offered lumbar epidural steroid injection however he defers at this time. He has continued with his physical therapy.  I

## 2023-06-19 ENCOUNTER — TELEPHONE (OUTPATIENT)
Dept: FAMILY MEDICINE CLINIC | Age: 48
End: 2023-06-19

## 2023-06-19 ENCOUNTER — HOSPITAL ENCOUNTER (OUTPATIENT)
Dept: PAIN MANAGEMENT | Age: 48
Discharge: HOME | End: 2023-06-19
Payer: MEDICAID

## 2023-06-19 VITALS
RESPIRATION RATE: 20 BRPM | HEART RATE: 88 BPM | TEMPERATURE: 97.3 F | SYSTOLIC BLOOD PRESSURE: 134 MMHG | BODY MASS INDEX: 29.51 KG/M2 | WEIGHT: 188 LBS | OXYGEN SATURATION: 96 % | DIASTOLIC BLOOD PRESSURE: 87 MMHG | HEIGHT: 67 IN

## 2023-06-19 DIAGNOSIS — M51.26 LUMBAR DISC HERNIATION: Chronic | ICD-10-CM

## 2023-06-19 DIAGNOSIS — M51.36 LUMBAR DEGENERATIVE DISC DISEASE: ICD-10-CM

## 2023-06-19 DIAGNOSIS — M54.16 LUMBAR RADICULOPATHY: Primary | ICD-10-CM

## 2023-06-19 PROCEDURE — 99213 OFFICE O/P EST LOW 20 MIN: CPT | Performed by: NURSE PRACTITIONER

## 2023-06-19 PROCEDURE — 99213 OFFICE O/P EST LOW 20 MIN: CPT

## 2023-06-19 RX ORDER — PREGABALIN 25 MG/1
25 CAPSULE ORAL DAILY
Qty: 30 CAPSULE | Refills: 2 | Status: SHIPPED | OUTPATIENT
Start: 2023-06-19 | End: 2023-09-17

## 2023-06-19 ASSESSMENT — ENCOUNTER SYMPTOMS
CONSTIPATION: 0
BACK PAIN: 1
BOWEL INCONTINENCE: 0
SHORTNESS OF BREATH: 0
COUGH: 0

## 2023-06-19 ASSESSMENT — PAIN DESCRIPTION - LOCATION: LOCATION: BACK

## 2023-06-19 NOTE — TELEPHONE ENCOUNTER
Noted. Thank you!   Please call patient and ask him those 4-6 questions on the form   Also asked him why does he request that, for what medical reason so we can help him make the form ASAP    Future Appointments   Date Time Provider Angelina Hooperi   8/7/2023  2:30 PM Waylon Zimmer MD [facility]   9/18/2023  8:40 AM Tejal Marino 23

## 2023-06-19 NOTE — TELEPHONE ENCOUNTER
Pt stated he needed the transportation due to no longer having his vehicle, and he needs transportation to get to and from all his doctors appts. Also he stated he will fill out his portion when  fills out her portion.

## 2023-06-19 NOTE — TELEPHONE ENCOUNTER
Patient came into office to drop off medical transportation  forms. To be completed by provider. Patient has/had appointment on 08/7. Patient will like a call back. When paper work has been completed/faxed and ready to be picked up in office. If no answer, patient gives verbal permission to leave a detail message. Also blank forms have been scanned into media. Also placed in provider basket. Thank you!     Future Appointments   Date Time Provider Angelina Manzo   8/7/2023  2:30 PM Waylon Zimmer MD [facility]   9/18/2023  8:40 AM Tejal Marino

## 2023-06-19 NOTE — PROGRESS NOTES
Chief Complaint   Patient presents with    Back Pain     Med refill        [facility]   Pt complains of low back pain. He had a work related injury about one and a half years ago. MRI lumbar with L4-L5 grade 1 retrolisthesis and mild bilateral neural foraminal narrowing. Left foraminal disc extrusion with superior migration contacts exiting left L4 nerve root. He has never had injections or surgery to the lumbar area. He is currently in PT with moderate benefit. Currently taking Lyrica -25 mg QD with benefit. Discussed trying lumbar steroid epidural and he declines. Back Pain  This is a chronic problem. The current episode started more than 1 year ago. The problem occurs constantly. The problem is unchanged. The pain is present in the lumbar spine. The quality of the pain is described as aching. The pain is at a severity of 5/10. The pain is moderate. The symptoms are aggravated by bending, position, standing, sitting and twisting. Associated symptoms include headaches and numbness. Pertinent negatives include no bladder incontinence, bowel incontinence, chest pain, fever, tingling or weakness. He has tried home exercises, muscle relaxant, heat and ice for the symptoms. Patient denies any new neurological symptoms. No bowel or bladder incontinence, no weakness, and no falling.     Past Medical History:   Diagnosis Date    Allergic rhinitis     Anxiety     Cervical spine pain 1/3/2019    Gastric reflux 3/30/2016    Headache(784.0)     Hepatitis     Hyperlipidemia 5/31/2018    Hypertension     IBS (irritable bowel syndrome) 12/17/2014    Insomnia     Iron deficiency anemia     Lumbar degenerative disc disease 12/13/2021    Mild single current episode of major depressive disorder ([facility] [address] 8/2/2018    Pancreatitis     Right lumbar radiculitis 1/13/2022    Seasonal allergies        Past Surgical History:   Procedure Laterality Date    ANKLE SURGERY  02/23/14    HAND SURGERY  08/02/13    [facility] [address]
Sexual Activity    Alcohol use: Yes     Alcohol/week: 0.0 standard drinks     Comment: beer once week    Drug use: Yes     Types: Marijuana Antonrosina Stapbette)    Sexual activity: Not on file   Other Topics Concern    Not on file   Social History Narrative    Not on file     Social Determinants of Health     Financial Resource Strain: Low Risk     Difficulty of Paying Living Expenses: Not hard at all   Food Insecurity: No Food Insecurity    Worried About Running Out of Food in the Last Year: Never true    Ran Out of Food in the Last Year: Never true   Transportation Needs: Not on file   Physical Activity: Not on file   Stress: Not on file   Social Connections: Not on file   Intimate Partner Violence: Not on file   Housing Stability: Not on file       Review of Systems:  Review of Systems   Cardiovascular:  Negative for chest pain. Musculoskeletal:  Positive for back pain. Gastrointestinal:  Negative for bowel incontinence. Genitourinary:  Negative for bladder incontinence. Neurological:  Positive for headaches and numbness. Negative for tingling and weakness. Physical Exam:  There were no vitals taken for this visit. Physical Exam    Record/Diagnostics Review:    Last clint  and was appropriate     Assessment:  Problem List Items Addressed This Visit          Other    Lumbar disc herniation (Chronic)    Lumbar radiculopathy - Primary          Treatment Plan:  Patient relates current medications are helping the pain. Patient reports taking pain medications as prescribed, denies obtaining medications from different sources and denies use of illegal drugs. Medication risk and benefits have been discussed. Patient denies side effects from medications like nausea, vomiting, constipation or drowsiness. Patient reports current activities of daily living are possible due to medications and would like to continue them.      As always, we encourage daily stretching and strengthening exercises, and recommend minimizing use of

## 2023-06-19 NOTE — TELEPHONE ENCOUNTER
Noted. Thank you!   Sure, he can wait for his PCP to complete his form if he cannot answer the 6 questions there are and that form because I really do not know him PCP is Dr. Glenn Ram    The question was if he can walk so many minutes, if he cannot stand up and wait for the bus to come, if he can interact with other people around him, if he has a wheelchair or electric scooter, please see the form for exact formulation of the questions,  \Otherwise await for his PCP to complete the form    Future Appointments   Date Time Provider Angelina Manzo   8/7/2023  2:30 PM Kristan Garcia MD [facility]   9/18/2023  8:40 AM Tejal Lucas 23

## 2023-07-05 ENCOUNTER — TELEPHONE (OUTPATIENT)
Dept: FAMILY MEDICINE CLINIC | Age: 48
End: 2023-07-05

## 2023-07-05 NOTE — TELEPHONE ENCOUNTER
1st attempt - LVM for pt advising not in network with insurance and will need to call and switch medicaid plans in order to stay with office, writer also adv provider will be out of the office as well.    2nd attempt - My Chart message sent

## 2023-07-29 DIAGNOSIS — M54.16 LUMBAR RADICULOPATHY: ICD-10-CM

## 2023-07-31 RX ORDER — PREGABALIN 25 MG/1
CAPSULE ORAL
Qty: 30 CAPSULE | Refills: 1 | OUTPATIENT
Start: 2023-07-31

## 2023-08-18 ENCOUNTER — HOSPITAL ENCOUNTER (OUTPATIENT)
Age: 48
Discharge: HOME | End: 2023-08-18
Payer: MEDICAID

## 2023-08-18 ENCOUNTER — OFFICE VISIT (OUTPATIENT)
Dept: FAMILY MEDICINE CLINIC | Age: 48
End: 2023-08-18
Payer: MEDICAID

## 2023-08-18 VITALS
HEIGHT: 67 IN | BODY MASS INDEX: 28.25 KG/M2 | WEIGHT: 180 LBS | OXYGEN SATURATION: 98 % | HEART RATE: 77 BPM | DIASTOLIC BLOOD PRESSURE: 80 MMHG | SYSTOLIC BLOOD PRESSURE: 120 MMHG

## 2023-08-18 DIAGNOSIS — Z20.2 STD EXPOSURE: ICD-10-CM

## 2023-08-18 DIAGNOSIS — Z12.5 PROSTATE CANCER SCREENING: ICD-10-CM

## 2023-08-18 DIAGNOSIS — R73.03 PREDIABETES: ICD-10-CM

## 2023-08-18 DIAGNOSIS — Z12.11 COLON CANCER SCREENING: ICD-10-CM

## 2023-08-18 DIAGNOSIS — M47.817 LUMBOSACRAL SPONDYLOSIS WITHOUT MYELOPATHY: ICD-10-CM

## 2023-08-18 DIAGNOSIS — E78.2 MIXED HYPERLIPIDEMIA: ICD-10-CM

## 2023-08-18 DIAGNOSIS — I10 ESSENTIAL HYPERTENSION: Primary | ICD-10-CM

## 2023-08-18 DIAGNOSIS — I10 ESSENTIAL HYPERTENSION: ICD-10-CM

## 2023-08-18 LAB
ALBUMIN SERPL-MCNC: 4.3 G/DL (ref 3.5–5.2)
ALP SERPL-CCNC: 62 U/L (ref 40–129)
ALT SERPL-CCNC: 13 U/L (ref 5–41)
ANION GAP SERPL CALCULATED.3IONS-SCNC: 8 MMOL/L (ref 9–17)
AST SERPL-CCNC: 19 U/L
BILIRUB SERPL-MCNC: 0.4 MG/DL (ref 0.3–1.2)
BILIRUB UR QL STRIP: NEGATIVE
BUN SERPL-MCNC: 11 MG/DL (ref 6–20)
CALCIUM SERPL-MCNC: 9.1 MG/DL (ref 8.6–10.4)
CHLORIDE SERPL-SCNC: 106 MMOL/L (ref 98–107)
CHOLEST SERPL-MCNC: 203 MG/DL
CHOLESTEROL/HDL RATIO: 3.6
CLARITY UR: CLEAR
CO2 SERPL-SCNC: 27 MMOL/L (ref 20–31)
COLOR UR: YELLOW
COMMENT: NORMAL
CREAT SERPL-MCNC: 0.8 MG/DL (ref 0.7–1.2)
ERYTHROCYTE [DISTWIDTH] IN BLOOD BY AUTOMATED COUNT: 14.8 % (ref 11.5–14.9)
EST. AVERAGE GLUCOSE BLD GHB EST-MCNC: 120 MG/DL
FOLATE SERPL-MCNC: 11.8 NG/ML
GFR SERPL CREATININE-BSD FRML MDRD: >60 ML/MIN/1.73M2
GLUCOSE SERPL-MCNC: 107 MG/DL (ref 70–99)
GLUCOSE UR STRIP-MCNC: NEGATIVE MG/DL
HBA1C MFR BLD: 5.8 % (ref 4–6)
HCT VFR BLD AUTO: 44.2 % (ref 41–53)
HDLC SERPL-MCNC: 56 MG/DL
HGB BLD-MCNC: 14.7 G/DL (ref 13.5–17.5)
HGB UR QL STRIP.AUTO: NEGATIVE
KETONES UR STRIP-MCNC: NEGATIVE MG/DL
LDLC SERPL CALC-MCNC: 135 MG/DL (ref 0–130)
LEUKOCYTE ESTERASE UR QL STRIP: NEGATIVE
MAGNESIUM SERPL-MCNC: 2.2 MG/DL (ref 1.6–2.6)
MCH RBC QN AUTO: 30.3 PG (ref 26–34)
MCHC RBC AUTO-ENTMCNC: 33.2 G/DL (ref 31–37)
MCV RBC AUTO: 91.2 FL (ref 80–100)
NITRITE UR QL STRIP: NEGATIVE
PH UR STRIP: 6.5 [PH] (ref 5–8)
PLATELET # BLD AUTO: 231 K/UL (ref 150–450)
PMV BLD AUTO: 7.8 FL (ref 6–12)
POTASSIUM SERPL-SCNC: 4.3 MMOL/L (ref 3.7–5.3)
PROT SERPL-MCNC: 6.8 G/DL (ref 6.4–8.3)
PROT UR STRIP-MCNC: NEGATIVE MG/DL
PSA SERPL-MCNC: 1.02 NG/ML
RBC # BLD AUTO: 4.85 M/UL (ref 4.5–5.9)
SODIUM SERPL-SCNC: 141 MMOL/L (ref 135–144)
SP GR UR STRIP: 1.02 (ref 1–1.03)
TRIGL SERPL-MCNC: 60 MG/DL
URATE SERPL-MCNC: 4.7 MG/DL (ref 3.4–7)
UROBILINOGEN UR STRIP-ACNC: NORMAL EU/DL (ref 0–1)
VIT B12 SERPL-MCNC: 764 PG/ML (ref 232–1245)
WBC OTHER # BLD: 3.4 K/UL (ref 3.5–11)

## 2023-08-18 PROCEDURE — 82746 ASSAY OF FOLIC ACID SERUM: CPT

## 2023-08-18 PROCEDURE — 83036 HEMOGLOBIN GLYCOSYLATED A1C: CPT

## 2023-08-18 PROCEDURE — 3079F DIAST BP 80-89 MM HG: CPT | Performed by: FAMILY MEDICINE

## 2023-08-18 PROCEDURE — 36415 COLL VENOUS BLD VENIPUNCTURE: CPT

## 2023-08-18 PROCEDURE — 80053 COMPREHEN METABOLIC PANEL: CPT

## 2023-08-18 PROCEDURE — G0103 PSA SCREENING: HCPCS

## 2023-08-18 PROCEDURE — 3074F SYST BP LT 130 MM HG: CPT | Performed by: FAMILY MEDICINE

## 2023-08-18 PROCEDURE — 81003 URINALYSIS AUTO W/O SCOPE: CPT

## 2023-08-18 PROCEDURE — G8417 CALC BMI ABV UP PARAM F/U: HCPCS | Performed by: FAMILY MEDICINE

## 2023-08-18 PROCEDURE — 82607 VITAMIN B-12: CPT

## 2023-08-18 PROCEDURE — 87491 CHLMYD TRACH DNA AMP PROBE: CPT

## 2023-08-18 PROCEDURE — 1036F TOBACCO NON-USER: CPT | Performed by: FAMILY MEDICINE

## 2023-08-18 PROCEDURE — 87661 TRICHOMONAS VAGINALIS AMPLIF: CPT

## 2023-08-18 PROCEDURE — 83735 ASSAY OF MAGNESIUM: CPT

## 2023-08-18 PROCEDURE — 85027 COMPLETE CBC AUTOMATED: CPT

## 2023-08-18 PROCEDURE — 84550 ASSAY OF BLOOD/URIC ACID: CPT

## 2023-08-18 PROCEDURE — 99214 OFFICE O/P EST MOD 30 MIN: CPT | Performed by: FAMILY MEDICINE

## 2023-08-18 PROCEDURE — G8427 DOCREV CUR MEDS BY ELIG CLIN: HCPCS | Performed by: FAMILY MEDICINE

## 2023-08-18 PROCEDURE — 87591 N.GONORRHOEAE DNA AMP PROB: CPT

## 2023-08-18 PROCEDURE — 80061 LIPID PANEL: CPT

## 2023-08-18 SDOH — ECONOMIC STABILITY: FOOD INSECURITY: WITHIN THE PAST 12 MONTHS, YOU WORRIED THAT YOUR FOOD WOULD RUN OUT BEFORE YOU GOT MONEY TO BUY MORE.: NEVER TRUE

## 2023-08-18 SDOH — ECONOMIC STABILITY: FOOD INSECURITY: WITHIN THE PAST 12 MONTHS, THE FOOD YOU BOUGHT JUST DIDN'T LAST AND YOU DIDN'T HAVE MONEY TO GET MORE.: NEVER TRUE

## 2023-08-18 SDOH — ECONOMIC STABILITY: INCOME INSECURITY: HOW HARD IS IT FOR YOU TO PAY FOR THE VERY BASICS LIKE FOOD, HOUSING, MEDICAL CARE, AND HEATING?: NOT HARD AT ALL

## 2023-08-18 ASSESSMENT — ENCOUNTER SYMPTOMS
BACK PAIN: 1
CHEST TIGHTNESS: 0
EYE REDNESS: 0
ABDOMINAL PAIN: 0
SINUS PRESSURE: 0
SORE THROAT: 0
CONSTIPATION: 0
TROUBLE SWALLOWING: 0
BLOOD IN STOOL: 0
RHINORRHEA: 0
SHORTNESS OF BREATH: 0
STRIDOR: 0
RECTAL PAIN: 0
VOMITING: 0
ABDOMINAL DISTENTION: 0
COLOR CHANGE: 0
NAUSEA: 0
DIARRHEA: 0
WHEEZING: 0
COUGH: 0

## 2023-08-18 ASSESSMENT — PATIENT HEALTH QUESTIONNAIRE - PHQ9
2. FEELING DOWN, DEPRESSED OR HOPELESS: 0
10. IF YOU CHECKED OFF ANY PROBLEMS, HOW DIFFICULT HAVE THESE PROBLEMS MADE IT FOR YOU TO DO YOUR WORK, TAKE CARE OF THINGS AT HOME, OR GET ALONG WITH OTHER PEOPLE: 0
1. LITTLE INTEREST OR PLEASURE IN DOING THINGS: 0
6. FEELING BAD ABOUT YOURSELF - OR THAT YOU ARE A FAILURE OR HAVE LET YOURSELF OR YOUR FAMILY DOWN: 0
SUM OF ALL RESPONSES TO PHQ9 QUESTIONS 1 & 2: 0
SUM OF ALL RESPONSES TO PHQ QUESTIONS 1-9: 0
SUM OF ALL RESPONSES TO PHQ QUESTIONS 1-9: 0
9. THOUGHTS THAT YOU WOULD BE BETTER OFF DEAD, OR OF HURTING YOURSELF: 0
8. MOVING OR SPEAKING SO SLOWLY THAT OTHER PEOPLE COULD HAVE NOTICED. OR THE OPPOSITE, BEING SO FIGETY OR RESTLESS THAT YOU HAVE BEEN MOVING AROUND A LOT MORE THAN USUAL: 0
5. POOR APPETITE OR OVEREATING: 0
3. TROUBLE FALLING OR STAYING ASLEEP: 0
7. TROUBLE CONCENTRATING ON THINGS, SUCH AS READING THE NEWSPAPER OR WATCHING TELEVISION: 0
SUM OF ALL RESPONSES TO PHQ QUESTIONS 1-9: 0
4. FEELING TIRED OR HAVING LITTLE ENERGY: 0
SUM OF ALL RESPONSES TO PHQ QUESTIONS 1-9: 0

## 2023-08-18 NOTE — PROGRESS NOTES
Visit Information    Have you changed or started any medications since your last visit including any over-the-counter medicines, vitamins, or herbal medicines? no   Are you having any side effects from any of your medications? -  no  Have you stopped taking any of your medications? Is so, why? -  no    Have you seen any other physician or provider since your last visit? No  Have you had any other diagnostic tests since your last visit? No  Have you been seen in the emergency room and/or had an admission to a hospital since we last saw you? No  Have you had your routine dental cleaning in the past 6 months? yes     Have you activated your [employer] account? If not, what are your barriers?  Yes     Patient Care Team:  Vinayak Mesa MD as PCP - General (Family Medicine)  Vinayak Mesa [facility]    Medical History Review  Past Medical, Family, and Social History reviewed and does contribute to the patient presenting condition    Health Maintenance   Topic Date Due    Colorectal Cancer Screen  09/01/2019    COVID-19 Vaccine (3 - Booster for [place] series) 07/09/2021    Flu vaccine (1) 08/01/2023    Lipids  07/27/2023    A1C test (Diabetic or Prediabetic)  11/08/2023    Depression Monitoring  11/08/2023    DTaP/Tdap/Td vaccine (2 - Td or Tdap) 05/31/2028    Hepatitis C screen  Completed    HIV screen  Completed    Hepatitis A vaccine  Aged Out    Hib vaccine  Aged Out    Meningococcal (ACWY) vaccine  Aged Out    Pneumococcal 0-64 years Vaccine  Aged Out

## 2023-08-18 NOTE — PATIENT INSTRUCTIONS
Thank you for choosing [facility]) as your healthcare provider as your care is important to us. Please arrive at your appointment on time. If you are unable to make your appointment, please call our office as soon as possible so that we may reschedule your appointment. Missing 3 appointments in a calendar year without notifying the office may lead to dismissal from the practice. We appreciate you calling at least 24 hours in advance, when possible. Thank you. New Updates for [facility]    Thank you for choosing [facility] to give you the best care! [facility]) is always trying to think of new ways to help their patients. We are asking all patients to try out the new digital registration that is now available through your [facility] account Via the devi you're now able to update your personal and registration information prior to your upcoming appointment. This will save you time once you arrive at the office to check-in, not to mention your information remains safe!! Many other perks come from signing up for an account, such as:  Requesting refills  Scheduling an appointment  Completing an E-Visit  Sending a message to the office/provider  Having access to your medication list  Paying your bill/copay prior to your appointment  Scheduling your yearly mammogram  Review your test results    If you are not familiar with [facility] please ask one of us and we will be happy to answer any questions or help you set-up your account.       Your [employer],

## 2023-08-21 LAB
CHLAMYDIA DNA UR QL NAA+PROBE: NEGATIVE
N GONORRHOEA DNA UR QL NAA+PROBE: NEGATIVE
SOURCE: NORMAL
SPECIMEN DESCRIPTION: NORMAL
TRICHOMONAS VAGINALI, MOLECULAR: NEGATIVE

## 2023-08-28 ENCOUNTER — TELEPHONE (OUTPATIENT)
Dept: FAMILY MEDICINE CLINIC | Age: 48
End: 2023-08-28

## 2023-08-28 DIAGNOSIS — K58.0 IRRITABLE BOWEL SYNDROME WITH DIARRHEA: ICD-10-CM

## 2023-08-28 RX ORDER — CITALOPRAM HYDROBROMIDE 10 MG/1
10 TABLET ORAL DAILY
Qty: 30 TABLET | Refills: 3 | Status: SHIPPED | OUTPATIENT
Start: 2023-08-28

## 2023-08-28 NOTE — TELEPHONE ENCOUNTER
Pt called in requesting a refill of his citalopram (CELEXA) 10 MG tablet and for it to be sent to [facility] [place].

## 2023-09-06 ENCOUNTER — TELEPHONE (OUTPATIENT)
Dept: PAIN MANAGEMENT | Age: 48
End: 2023-09-06

## 2023-09-06 DIAGNOSIS — M54.16 LUMBAR RADICULOPATHY: ICD-10-CM

## 2023-09-06 RX ORDER — PREGABALIN 25 MG/1
25 CAPSULE ORAL DAILY
Qty: 30 CAPSULE | Refills: 0 | Status: SHIPPED | OUTPATIENT
Start: 2023-09-06 | End: 2023-12-05

## 2023-09-06 NOTE — TELEPHONE ENCOUNTER
Patient called in requesting a refill on his Lyrica, states he took his last dose today, 09/06/2023. Please advise.    Last OV 06/19/2023  Next OV 09/18/2023

## 2023-09-08 ENCOUNTER — TELEPHONE (OUTPATIENT)
Dept: GASTROENTEROLOGY | Age: 48
End: 2023-09-08

## 2023-09-19 ENCOUNTER — HOSPITAL ENCOUNTER (OUTPATIENT)
Dept: PAIN MANAGEMENT | Age: 48
Discharge: HOME | End: 2023-09-19
Payer: MEDICAID

## 2023-09-19 VITALS — WEIGHT: 188 LBS | BODY MASS INDEX: 29.51 KG/M2 | TEMPERATURE: 97 F | HEIGHT: 67 IN

## 2023-09-19 DIAGNOSIS — G89.29 CHRONIC BILATERAL LOW BACK PAIN WITHOUT SCIATICA: ICD-10-CM

## 2023-09-19 DIAGNOSIS — M51.26 LUMBAR DISC HERNIATION: Chronic | ICD-10-CM

## 2023-09-19 DIAGNOSIS — M54.16 LUMBAR RADICULOPATHY: Primary | ICD-10-CM

## 2023-09-19 DIAGNOSIS — M54.50 CHRONIC BILATERAL LOW BACK PAIN WITHOUT SCIATICA: ICD-10-CM

## 2023-09-19 DIAGNOSIS — M51.36 LUMBAR DEGENERATIVE DISC DISEASE: ICD-10-CM

## 2023-09-19 PROCEDURE — 99213 OFFICE O/P EST LOW 20 MIN: CPT | Performed by: NURSE PRACTITIONER

## 2023-09-19 PROCEDURE — 99213 OFFICE O/P EST LOW 20 MIN: CPT

## 2023-09-19 RX ORDER — PREGABALIN 25 MG/1
25 CAPSULE ORAL 2 TIMES DAILY
Qty: 60 CAPSULE | Refills: 2 | Status: SHIPPED | OUTPATIENT
Start: 2023-09-19 | End: 2023-12-18

## 2023-09-19 ASSESSMENT — ENCOUNTER SYMPTOMS
BOWEL INCONTINENCE: 0
COUGH: 0
SHORTNESS OF BREATH: 0
BACK PAIN: 1
CONSTIPATION: 0

## 2023-09-19 ASSESSMENT — PAIN SCALES - GENERAL: PAINLEVEL_OUTOF10: 4

## 2023-09-19 NOTE — PROGRESS NOTES
He declines. Follow up appointment made for 3 months    I have reviewed the chief complaint and history of present illness (including ROS and PFSH) and vital documentation by my staff and I agree with their documentation and have added where applicable.

## 2023-10-16 ENCOUNTER — HOSPITAL ENCOUNTER (EMERGENCY)
Age: 48
Discharge: HOME | End: 2023-10-16
Attending: EMERGENCY MEDICINE
Payer: COMMERCIAL

## 2023-10-16 VITALS
HEIGHT: 68 IN | OXYGEN SATURATION: 96 % | DIASTOLIC BLOOD PRESSURE: 81 MMHG | WEIGHT: 188 LBS | RESPIRATION RATE: 18 BRPM | TEMPERATURE: 98.1 F | BODY MASS INDEX: 28.49 KG/M2 | SYSTOLIC BLOOD PRESSURE: 123 MMHG | HEART RATE: 79 BPM

## 2023-10-16 DIAGNOSIS — J06.9 VIRAL URI: Primary | ICD-10-CM

## 2023-10-16 PROCEDURE — 99283 EMERGENCY DEPT VISIT LOW MDM: CPT

## 2023-10-16 RX ORDER — GUAIFENESIN 600 MG/1
600 TABLET, EXTENDED RELEASE ORAL 2 TIMES DAILY PRN
Qty: 14 TABLET | Refills: 0 | Status: SHIPPED | OUTPATIENT
Start: 2023-10-16

## 2023-10-16 ASSESSMENT — PAIN SCALES - GENERAL: PAINLEVEL_OUTOF10: 5

## 2023-10-16 ASSESSMENT — ENCOUNTER SYMPTOMS
COUGH: 0
RHINORRHEA: 1
COLOR CHANGE: 0
SORE THROAT: 0
ABDOMINAL PAIN: 0
DIARRHEA: 0
VOMITING: 0
BACK PAIN: 0
SHORTNESS OF BREATH: 0
NAUSEA: 0

## 2023-10-16 ASSESSMENT — PAIN DESCRIPTION - LOCATION: LOCATION: THROAT

## 2023-10-16 ASSESSMENT — PAIN - FUNCTIONAL ASSESSMENT: PAIN_FUNCTIONAL_ASSESSMENT: 0-10

## 2023-10-16 NOTE — ED NOTES
Pt to er with c/o sore throat. Pt states s/sx started this morning. Pt states he has had cough that started as dry cough. Pt states he is now bringing up phlegm. Pt denies recent fever or chills. Pt a&ox3. Skin warm and dry. Respirations even and non-labored.        Kelsea De Oliveira RN  10/16/23 [id number]

## 2023-10-16 NOTE — ED PROVIDER NOTES
[facility]  eMERGENCY dEPARTMENT eNCOUnter      Pt Name: Slava Tellez  MRN: [id number]  [address] [facility] 1975  Date of evaluation: 10/16/2023  Provider: CHAIM Munoz CNP    CHIEF COMPLAINT       Chief Complaint   Patient presents with    Pharyngitis    Sinusitis         HISTORY OF PRESENT ILLNESS  (Location/Symptom, Timing/Onset, Context/Setting, Quality, Duration, Modifying Factors, Severity.)   Slava Tellez is a 50 y.o. male who presents to the emergency department. C/o sinus congestion/drainage, sore throat, fatigue, body aches. Onset was 5 days ago. Denies concern for Covid-19, influenza, or strep. Denies fever, SOB, N/V/D, weakness. Rates his pain 5/10 at this time. He takes Zyrtec and Flonase for his allergies. Nursing Notes were reviewed. ALLERGIES     Seasonal    CURRENT MEDICATIONS       Previous Medications    ATORVASTATIN (LIPITOR) 40 MG TABLET    Take 1 tablet by mouth daily    CETIRIZINE (ZYRTEC) 10 MG TABLET    TAKE 1 TABLET BY MOUTH DAILY    CITALOPRAM (CELEXA) 10 MG TABLET    Take 1 tablet by mouth daily    ELASTIC BANDAGES & SUPPORTS (KNEE BRACE/HINGED/LARGE) MISC    Use daily for knee pain    ELASTIC BANDAGES & SUPPORTS (LUMBAR BACK BRACE/SUPPORT PAD) MISC    Please provide orthonyx back brace works better for patient needs xtra - large size . EPINEPHRINE (EPIPEN) 0.3 MG/0.3ML SOAJ INJECTION    Inject 1 mL into the muscle as needed (Anaphylaxis) Use as directed for allergic reaction    FLUTICASONE (FLONASE) 50 MCG/ACT NASAL SPRAY    INSTILL ONE SPRAY IN EACH NOSTRIL DAILY    LOSARTAN (COZAAR) 50 MG TABLET    TAKE 1 & 1/2 TABLETS BY MOUTH DAILY    PREGABALIN (LYRICA) 25 MG CAPSULE    Take 1 capsule by mouth 2 times daily for 90 days.  Max Daily Amount: 50 mg       PAST MEDICAL HISTORY         Diagnosis Date    Allergic rhinitis     Anxiety     Cervical spine pain 1/3/2019    Gastric reflux 3/30/2016    Headache(784.0)     Hepatitis     Hyperlipidemia

## 2023-10-17 NOTE — ED PROVIDER NOTES
eMERGENCY dEPARTMENT eNCOUnter   Independent Attestation     Pt Name: Heather Cardoso  MRN: [id number]  [address] [facility] 1975  Date of evaluation: 10/17/23     Heather Cardoso is a 50 y.o. male with CC: Pharyngitis and Sinusitis      Based on the medical record the care appears appropriate. I was personally available for consultation in the Emergency Department.     Zeferino Fenton MD  Attending Emergency Physician                  Zeferino Fenton MD  10/17/23 4448

## 2023-10-21 ENCOUNTER — HOSPITAL ENCOUNTER (EMERGENCY)
Age: 48
Discharge: HOME | End: 2023-10-21
Attending: STUDENT IN AN ORGANIZED HEALTH CARE EDUCATION/TRAINING PROGRAM
Payer: COMMERCIAL

## 2023-10-21 VITALS
TEMPERATURE: 98.1 F | DIASTOLIC BLOOD PRESSURE: 101 MMHG | RESPIRATION RATE: 16 BRPM | HEART RATE: 84 BPM | SYSTOLIC BLOOD PRESSURE: 123 MMHG | OXYGEN SATURATION: 97 %

## 2023-10-21 DIAGNOSIS — R09.81 NASAL CONGESTION: Primary | ICD-10-CM

## 2023-10-21 PROCEDURE — 6370000000 HC RX 637 (ALT 250 FOR IP): Performed by: NURSE PRACTITIONER

## 2023-10-21 PROCEDURE — 99283 EMERGENCY DEPT VISIT LOW MDM: CPT

## 2023-10-21 RX ORDER — AMOXICILLIN 500 MG/1
500 CAPSULE ORAL 2 TIMES DAILY
Qty: 20 CAPSULE | Refills: 0 | Status: SHIPPED | OUTPATIENT
Start: 2023-10-21 | End: 2023-10-31

## 2023-10-21 RX ORDER — AMOXICILLIN 500 MG/1
500 CAPSULE ORAL ONCE
Status: COMPLETED | OUTPATIENT
Start: 2023-10-21 | End: 2023-10-21

## 2023-10-21 RX ADMIN — AMOXICILLIN 500 MG: 500 CAPSULE ORAL at 21:05

## 2023-10-21 ASSESSMENT — ENCOUNTER SYMPTOMS
NAUSEA: 0
COUGH: 1
RHINORRHEA: 1
SORE THROAT: 0
ABDOMINAL PAIN: 0
SHORTNESS OF BREATH: 0
TROUBLE SWALLOWING: 0
WHEEZING: 0
SINUS PRESSURE: 1
SINUS PAIN: 1
VOMITING: 0

## 2023-10-21 ASSESSMENT — VISUAL ACUITY: OU: 1

## 2023-10-22 NOTE — DISCHARGE INSTRUCTIONS
Take medication as prescribed. Follow-up with your primary care provider as scheduled. Return to emergency department for worsening or new symptoms.

## 2023-10-22 NOTE — ED PROVIDER NOTES
R73.03    Chronic bilateral low back pain without sciatica M54.50, G89.29    Cervical spine pain M54.2    Effusion, left knee M25.462    Grade 1 injury of medial collateral ligament of left knee S83.412A    Lumbar degenerative disc disease M51.36    Injury of left thumb S69. 92XA    Lumbar radiculopathy M54.16    Lumbar disc herniation M51.26    Lumbosacral spondylosis without myelopathy M47.817         DISPOSITION/PLAN   DISPOSITION Decision To Discharge 10/21/2023 08:40:29 PM      PATIENT REFERRED TO:   [address] [facility] [phone]      Follow-up as scheduled    [facility] ED  [address]  [phone]    If symptoms worsen      DISCHARGE MEDICATIONS:     New Prescriptions    AMOXICILLIN (AMOXIL) 500 MG CAPSULE    Take 1 capsule by mouth 2 times daily for 10 days           (Please note that portions of this note were completed with a voice recognition program.  Efforts were made to edit the dictations but occasionally words are mis-transcribed.)    CHAIM Lee CNP, APRN - CNP  10/21/23 2044

## 2023-11-27 DIAGNOSIS — J30.2 SEASONAL ALLERGIES: ICD-10-CM

## 2023-11-28 RX ORDER — CETIRIZINE HYDROCHLORIDE 10 MG/1
TABLET ORAL
Qty: 30 TABLET | Refills: 2 | Status: SHIPPED | OUTPATIENT
Start: 2023-11-28

## 2023-11-28 NOTE — TELEPHONE ENCOUNTER
Please Approve or Refuse.   Send to Pharmacy per Pt's Request: [facility] Ferrer     Next Visit Date:  12/18/2023   Last Visit Date: 8/18/2023    Hemoglobin A1C (%)   Date Value   08/18/2023 5.8   11/08/2022 5.7   07/27/2022 5.8             ( goal A1C is < 7)   BP Readings from Last 3 Encounters:   10/21/23 (!) 123/101   10/16/23 123/81   08/18/23 120/80          (goal 120/80)  BUN   Date Value Ref Range Status   08/18/2023 11 6 - 20 mg/dL Final     Creatinine   Date Value Ref Range Status   08/18/2023 0.8 0.7 - 1.2 mg/dL Final     Potassium   Date Value Ref Range Status   08/18/2023 4.3 3.7 - 5.3 mmol/L Final

## 2023-12-18 PROBLEM — M25.562 ACUTE PAIN OF LEFT KNEE: Status: RESOLVED | Noted: 2020-04-28 | Resolved: 2023-12-18

## 2023-12-18 PROBLEM — M25.462 EFFUSION, LEFT KNEE: Status: RESOLVED | Noted: 2021-09-10 | Resolved: 2023-12-18

## 2023-12-18 PROBLEM — D70.9 NEUTROPENIA (HCC): Status: ACTIVE | Noted: 2023-12-18

## 2023-12-18 PROBLEM — S69.92XA INJURY OF LEFT THUMB: Status: RESOLVED | Noted: 2021-12-13 | Resolved: 2023-12-18

## 2023-12-21 ENCOUNTER — HOSPITAL ENCOUNTER (OUTPATIENT)
Dept: PREADMISSION TESTING | Age: 48
Discharge: HOME | End: 2023-12-25

## 2023-12-21 VITALS — HEIGHT: 67 IN | BODY MASS INDEX: 27.94 KG/M2 | WEIGHT: 178 LBS

## 2023-12-21 NOTE — PROGRESS NOTES
Pre-op Instructions For Out-Patient Endoscopy Surgery    Medication Instructions:  Please stop herbs and any supplements now (includes vitamins and minerals). Please contact your surgeon and prescribing physician for pre-op instructions for any blood thinners. If you have inhalers/aerosol treatments at home, please use them the morning of your surgery and bring the inhalers with you to the hospital.    Please take the following medications the morning of your surgery with a sip of water:    None    Surgery Instructions:  After midnight before surgery:  Do not eat or drink anything, including water, mints, gum, and hard candy. You may brush your teeth without swallowing. No smoking, chewing tobacco, or street drugs. Please shower or bathe before surgery. Please do not wear any cologne, lotion, powder, jewelry, piercings, perfume, makeup, nail polish, hair accessories, or hair spray on the day of surgery. Wear loose comfortable clothing. Leave your valuables at home but bring a payment source for any after-surgery prescriptions you plan to fill at [facility]. Bring a storage case for any glasses/contacts. An adult who is responsible for you MUST drive you home and should be with you for the first 24 hours after surgery. The Day of Surgery:  Arrive at [facility]tr[facility] at the time directed by your surgeon and check in at the desk. If you have a living will or healthcare power of , please bring a copy. You will be taken to the pre-op holding area where you will be prepared for surgery. A physical assessment will be performed by a nurse practitioner or house officer. Your IV will be started and you will meet your anesthesiologist.    When you go to surgery, your family will be directed to the surgical waiting room, where the doctor should speak with them after your surgery. After surgery, you will be taken to the recovery area.   When you

## 2023-12-21 NOTE — PROGRESS NOTES
Patient mentioned not having a ride for the procedure and using a cab, writer informed him he can not do that, must have a responsible adult to pick him up for his procedure or they will not do the procedure. Patient verbalized understanding and stated he will work on getting a ride for the procedure.

## 2023-12-30 ENCOUNTER — HOSPITAL ENCOUNTER (OUTPATIENT)
Age: 48
Discharge: HOME | End: 2023-12-30
Payer: COMMERCIAL

## 2023-12-30 ENCOUNTER — APPOINTMENT (OUTPATIENT)
Dept: GENERAL RADIOLOGY | Age: 48
End: 2023-12-30
Payer: COMMERCIAL

## 2023-12-30 ENCOUNTER — HOSPITAL ENCOUNTER (EMERGENCY)
Age: 48
Discharge: HOME | End: 2023-12-30
Attending: EMERGENCY MEDICINE
Payer: COMMERCIAL

## 2023-12-30 VITALS
BODY MASS INDEX: 28.88 KG/M2 | RESPIRATION RATE: 18 BRPM | WEIGHT: 184 LBS | OXYGEN SATURATION: 98 % | SYSTOLIC BLOOD PRESSURE: 122 MMHG | HEIGHT: 67 IN | HEART RATE: 96 BPM | TEMPERATURE: 98.7 F | DIASTOLIC BLOOD PRESSURE: 90 MMHG

## 2023-12-30 DIAGNOSIS — Z11.59 ENCOUNTER FOR SCREENING FOR OTHER VIRAL DISEASES: ICD-10-CM

## 2023-12-30 DIAGNOSIS — J06.9 ACUTE UPPER RESPIRATORY INFECTION: Primary | ICD-10-CM

## 2023-12-30 LAB
ABSOLUTE BANDS #: 0.13 K/UL (ref 0–1)
BANDS: 3 % (ref 0–10)
BASOPHILS # BLD: 0 K/UL (ref 0–0.2)
BASOPHILS NFR BLD: 0 % (ref 0–2)
EOSINOPHIL # BLD: 0 K/UL (ref 0–0.4)
EOSINOPHILS RELATIVE PERCENT: 0 % (ref 0–4)
ERYTHROCYTE [DISTWIDTH] IN BLOOD BY AUTOMATED COUNT: 14.4 % (ref 11.5–14.9)
FLUAV RNA RESP QL NAA+PROBE: NOT DETECTED
FLUBV RNA RESP QL NAA+PROBE: NOT DETECTED
HBV SURFACE AB SERPL IA-ACNC: <3.5 MIU/ML
HCT VFR BLD AUTO: 46.3 % (ref 41–53)
HGB BLD-MCNC: 15.3 G/DL (ref 13.5–17.5)
LYMPHOCYTES NFR BLD: 1.36 K/UL (ref 1–4.8)
LYMPHOCYTES RELATIVE PERCENT: 31 % (ref 24–44)
MCH RBC QN AUTO: 30.7 PG (ref 26–34)
MCHC RBC AUTO-ENTMCNC: 33.1 G/DL (ref 31–37)
MCV RBC AUTO: 92.7 FL (ref 80–100)
MONOCYTES NFR BLD: 1.06 K/UL (ref 0.1–1.3)
MONOCYTES NFR BLD: 24 % (ref 1–7)
MORPHOLOGY: ABNORMAL
NEUTROPHILS NFR BLD: 42 % (ref 36–66)
NEUTS SEG NFR BLD: 1.85 K/UL (ref 1.3–9.1)
PLATELET # BLD AUTO: 223 K/UL (ref 150–450)
PMV BLD AUTO: 8.3 FL (ref 6–12)
RBC # BLD AUTO: 4.99 M/UL (ref 4.5–5.9)
SARS-COV-2 RNA RESP QL NAA+PROBE: NOT DETECTED
SOURCE: NORMAL
SPECIMEN DESCRIPTION: NORMAL
WBC OTHER # BLD: 4.4 K/UL (ref 3.5–11)

## 2023-12-30 PROCEDURE — 96372 THER/PROPH/DIAG INJ SC/IM: CPT

## 2023-12-30 PROCEDURE — 36415 COLL VENOUS BLD VENIPUNCTURE: CPT

## 2023-12-30 PROCEDURE — 6360000002 HC RX W HCPCS: Performed by: EMERGENCY MEDICINE

## 2023-12-30 PROCEDURE — 99284 EMERGENCY DEPT VISIT MOD MDM: CPT

## 2023-12-30 PROCEDURE — 87636 SARSCOV2 & INF A&B AMP PRB: CPT

## 2023-12-30 PROCEDURE — 71045 X-RAY EXAM CHEST 1 VIEW: CPT

## 2023-12-30 PROCEDURE — 86317 IMMUNOASSAY INFECTIOUS AGENT: CPT

## 2023-12-30 PROCEDURE — 6370000000 HC RX 637 (ALT 250 FOR IP): Performed by: EMERGENCY MEDICINE

## 2023-12-30 PROCEDURE — 85025 COMPLETE CBC W/AUTO DIFF WBC: CPT

## 2023-12-30 RX ORDER — BENZONATATE 100 MG/1
100 CAPSULE ORAL ONCE
Status: COMPLETED | OUTPATIENT
Start: 2023-12-30 | End: 2023-12-30

## 2023-12-30 RX ORDER — KETOROLAC TROMETHAMINE 30 MG/ML
30 INJECTION, SOLUTION INTRAMUSCULAR; INTRAVENOUS ONCE
Status: COMPLETED | OUTPATIENT
Start: 2023-12-30 | End: 2023-12-30

## 2023-12-30 RX ADMIN — BENZONATATE 100 MG: 100 CAPSULE ORAL at 14:28

## 2023-12-30 RX ADMIN — Medication 1 LOZENGE: at 14:29

## 2023-12-30 RX ADMIN — KETOROLAC TROMETHAMINE 30 MG: 30 INJECTION INTRAMUSCULAR; INTRAVENOUS at 14:21

## 2023-12-30 ASSESSMENT — PAIN - FUNCTIONAL ASSESSMENT: PAIN_FUNCTIONAL_ASSESSMENT: 0-10

## 2023-12-30 ASSESSMENT — PAIN DESCRIPTION - DESCRIPTORS: DESCRIPTORS: ACHING;DULL

## 2023-12-30 ASSESSMENT — PAIN SCALES - GENERAL: PAINLEVEL_OUTOF10: 7

## 2023-12-30 ASSESSMENT — LIFESTYLE VARIABLES
HOW OFTEN DO YOU HAVE A DRINK CONTAINING ALCOHOL: NEVER
HOW MANY STANDARD DRINKS CONTAINING ALCOHOL DO YOU HAVE ON A TYPICAL DAY: PATIENT DOES NOT DRINK

## 2023-12-30 ASSESSMENT — PAIN DESCRIPTION - LOCATION
LOCATION: GENERALIZED
LOCATION: BACK

## 2023-12-30 ASSESSMENT — PAIN DESCRIPTION - ORIENTATION: ORIENTATION: RIGHT;LEFT;MID

## 2023-12-30 NOTE — ED TRIAGE NOTES
Mode of arrival (squad #, walk in, police, etc) : walk-in        Chief complaint(s): concerns for covid         Arrival Note (brief scenario, treatment PTA, etc). : patient states, sore throat, cough, generalized bodyaches         C= \"Have you ever felt that you should Cut down on your drinking? \"  No  A= \"Have people Annoyed you by criticizing your drinking? \"  No  G= \"Have you ever felt bad or Guilty about your drinking? \"  No  E= \"Have you ever had a drink as an Eye-opener first thing in the morning to steady your nerves or to help a hangover? \"  No      Deferred []      Reason for deferring: N/A    *If yes to two or more: probable alcohol abuse. *

## 2024-01-01 ASSESSMENT — ENCOUNTER SYMPTOMS
COUGH: 1
COLOR CHANGE: 0
EYE PAIN: 0
BACK PAIN: 0
SHORTNESS OF BREATH: 0
ABDOMINAL PAIN: 0

## 2024-01-01 NOTE — ED PROVIDER NOTES
EMERGENCY DEPARTMENT ENCOUNTER    Pt Name: John Armenta  MRN: [id number]  Birthdate 1975  Date of evaluation: 1/1/24  CHIEF COMPLAINT       Chief Complaint   Patient presents with    Concern For COVID-19     HISTORY OF PRESENT ILLNESS   48-year-old male presents with complaints of cough, body aches, congestion and sore throat.  Reports symptoms for the last couple days, denies any known sick contacts, reports concern for COVID or flu, he denies any chest pain or shortness of breath denies any significant past medical history    The history is provided by the patient.           REVIEW OF SYSTEMS     Review of Systems   Constitutional:  Negative for chills and fever.   HENT:  Negative for congestion and ear pain.    Eyes:  Negative for pain.   Respiratory:  Positive for cough. Negative for shortness of breath.    Cardiovascular:  Negative for chest pain, palpitations and leg swelling.   Gastrointestinal:  Negative for abdominal pain.   Genitourinary:  Negative for dysuria and flank pain.   Musculoskeletal:  Negative for back pain.   Skin:  Negative for color change.   Neurological:  Negative for numbness and headaches.   Psychiatric/Behavioral:  Negative for confusion.    All other systems reviewed and are negative.    PASTMEDICAL HISTORY     Past Medical History:   Diagnosis Date    Allergic rhinitis     Anxiety     Cervical spine pain 01/03/2019    Gastric reflux 03/30/2016    Headache(784.0)     Hepatitis     Hyperlipidemia 05/31/2018    Hypertension     IBS (irritable bowel syndrome) 12/17/2014    Insomnia     Iron deficiency anemia     Liver disease 12/2008    Lumbar degenerative disc disease 12/13/2021    Mild single current episode of major depressive disorder (HCC) 08/02/2018    Pancreatitis     Right lumbar radiculitis 01/13/2022    Seasonal allergies      Past Problem List  Patient Active Problem List   Diagnosis Code    Essential hypertension I10    Irritable bowel syndrome with diarrhea K58.0

## 2024-01-02 ENCOUNTER — TELEPHONE (OUTPATIENT)
Dept: FAMILY MEDICINE CLINIC | Age: 49
End: 2024-01-02

## 2024-01-02 DIAGNOSIS — D72.821 MONOCYTOSIS: Primary | ICD-10-CM

## 2024-01-02 NOTE — TELEPHONE ENCOUNTER
.[facility] ED Follow up Call    Reason for ED visit:      12/30/2023 (2 hours)  [facility] ED     Andrei Pereira, DO  Last attending  Treatment team Acute upper respiratory infection  Clinical impression Concern For COVID-19  Chief complain         Hi John , this is KYRA from Wan Dowd's office, just calling to see how you are doing after your recent ED visit.    Did you receive discharge instructions?  Yes  Do you understand the discharge instructions? Yes  Did the ED give you any new prescriptions? Yes  Were you able to fill your prescriptions? Yes      Do you have one of our red, yellow and green  Zone sheets that help you to determine when you should go to the ED?Yes      Do you need or want to make a follow up appt with your PCP?Yes    Do you have any further needs in the home i.e. Equipment?  Yes        FU appts/Provider:    Future Appointments   Date Time Provider Department Center   1/5/2024  8:00 AM Christine Sneed MD fp [facility]   1/8/2024 12:40 PM Dianne Atkins, APRN - CNP [facility] PAINMGT [place]   4/18/2024  9:00 AM [facility]

## 2024-01-08 ENCOUNTER — HOSPITAL ENCOUNTER (OUTPATIENT)
Dept: PAIN MANAGEMENT | Age: 49
Discharge: HOME | End: 2024-01-08
Payer: COMMERCIAL

## 2024-01-08 ENCOUNTER — TELEMEDICINE (OUTPATIENT)
Dept: FAMILY MEDICINE CLINIC | Age: 49
End: 2024-01-08
Payer: COMMERCIAL

## 2024-01-08 VITALS — BODY MASS INDEX: 29.03 KG/M2 | WEIGHT: 185 LBS | HEIGHT: 67 IN

## 2024-01-08 DIAGNOSIS — J30.2 SEASONAL ALLERGIES: ICD-10-CM

## 2024-01-08 DIAGNOSIS — R06.02 SOB (SHORTNESS OF BREATH): ICD-10-CM

## 2024-01-08 DIAGNOSIS — R05.3 CHRONIC COUGH: Primary | ICD-10-CM

## 2024-01-08 DIAGNOSIS — M51.26 LUMBAR DISC HERNIATION: Chronic | ICD-10-CM

## 2024-01-08 DIAGNOSIS — M54.16 LUMBAR RADICULOPATHY: Primary | ICD-10-CM

## 2024-01-08 DIAGNOSIS — D72.821 MONOCYTOSIS: ICD-10-CM

## 2024-01-08 DIAGNOSIS — R55 PRE-SYNCOPE: ICD-10-CM

## 2024-01-08 PROCEDURE — 99213 OFFICE O/P EST LOW 20 MIN: CPT

## 2024-01-08 PROCEDURE — G8428 CUR MEDS NOT DOCUMENT: HCPCS | Performed by: FAMILY MEDICINE

## 2024-01-08 PROCEDURE — 99214 OFFICE O/P EST MOD 30 MIN: CPT | Performed by: FAMILY MEDICINE

## 2024-01-08 RX ORDER — PREGABALIN 25 MG/1
25 CAPSULE ORAL 2 TIMES DAILY
Qty: 60 CAPSULE | Refills: 2 | Status: SHIPPED | OUTPATIENT
Start: 2024-01-08 | End: 2024-04-07

## 2024-01-08 RX ORDER — ALBUTEROL SULFATE 90 UG/1
2 AEROSOL, METERED RESPIRATORY (INHALATION) 4 TIMES DAILY PRN
Qty: 18 G | Refills: 0 | Status: SHIPPED | OUTPATIENT
Start: 2024-01-08

## 2024-01-08 ASSESSMENT — ENCOUNTER SYMPTOMS
DIARRHEA: 0
ABDOMINAL PAIN: 0
BLOOD IN STOOL: 0
NAUSEA: 0
COUGH: 1
TROUBLE SWALLOWING: 0
CONSTIPATION: 0
SHORTNESS OF BREATH: 1
EYE REDNESS: 0
WHEEZING: 1
RECTAL PAIN: 0
COLOR CHANGE: 0
SORE THROAT: 0
COUGH: 0
STRIDOR: 0
BOWEL INCONTINENCE: 0
BACK PAIN: 1
CHEST TIGHTNESS: 0
SINUS PRESSURE: 1
ABDOMINAL DISTENTION: 0
RHINORRHEA: 0
BACK PAIN: 0
SHORTNESS OF BREATH: 0
CONSTIPATION: 0
VOMITING: 0

## 2024-01-08 ASSESSMENT — PAIN DESCRIPTION - LOCATION: LOCATION: BACK

## 2024-01-08 ASSESSMENT — PAIN SCALES - GENERAL: PAINLEVEL_OUTOF10: 5

## 2024-01-08 NOTE — PROGRESS NOTES
John Armenta, was evaluated through a synchronous (real-time) audio-video encounter. The patient (or guardian if applicable) is aware that this is a billable service, which includes applicable co-pays. This Virtual Visit was conducted with patient's (and/or legal guardian's) consent. Patient identification was verified, and a caregiver was present when appropriate.   The patient was located at Home: [address] [place] [id number]  Provider was located at Facility (Appt Dept): [address] [place] [id number]      John Armenta (:  1975) is a Established patient, presenting virtually for evaluation of the following:    Assessment & Plan   Below is the assessment and plan developed based on review of pertinent history, physical exam, labs, studies, and medications.  1. Chronic cough  Chronic cough likely due to underlying asthma, will do PFT start on albuterol inhaler as needed continue Flonase and Claritin.  -     Full PFT Study With Bronchodilator; Future  -     albuterol sulfate HFA (VENTOLIN HFA) 108 (90 Base) MCG/ACT inhaler; Inhale 2 puffs into the lungs 4 times daily as needed for Wheezing, Disp-18 g, R-0Normal  2. Pre-syncope  Only 1 episode, will do EKG, possible echo in the next appointment if needed  -     EKG 12 lead; Future  3. SOB (shortness of breath)  Chronic, worsening, evaluate for asthma PFT ordered start on albuterol inhaler as needed.  -     Full PFT Study With Bronchodilator; Future  -     albuterol sulfate HFA (VENTOLIN HFA) 108 (90 Base) MCG/ACT inhaler; Inhale 2 puffs into the lungs 4 times daily as needed for Wheezing, Disp-18 g, R-0Normal  4. Seasonal allergies  -     albuterol sulfate HFA (VENTOLIN HFA) 108 (90 Base) MCG/ACT inhaler; Inhale 2 puffs into the lungs 4 times daily as needed for Wheezing, Disp-18 g, R-0Normal  5. Monocytosis    Discussed with patient to do blood work which is ordered including flow cytometry.  No follow-ups on

## 2024-01-08 NOTE — PROGRESS NOTES
Chief Complaint   Patient presents with    Back Pain        [facility]  Pt complains of low back pain. He had a work related injury about one and a half years ago. MRI lumbar with L4-L5 grade 1 retrolisthesis and mild bilateral neural foraminal narrowing. Left foraminal disc extrusion with superior migration contacts exiting left L4 nerve root.  He has never had injections or surgery to the lumbar area. He is currently in PT and does have some soreness following 3 sessions. Currently taking Lyrica -25 mg BID. Discussed trying lumbar steroid epidural and he declines.         Back Pain  This is a chronic problem. The current episode started more than 1 year ago. The problem occurs constantly. The problem is unchanged. The pain is present in the lumbar spine. The quality of the pain is described as aching. The pain radiates to the left knee, left foot and left thigh. The pain is at a severity of 7/10. The symptoms are aggravated by position and lying down. Associated symptoms include leg pain. Pertinent negatives include no bladder incontinence, bowel incontinence, chest pain or fever. He has tried ice, heat and home exercises for the symptoms.        Patient denies any new neurological symptoms. No bowel or bladder incontinence, no weakness, and no falling.      Past Medical History:   Diagnosis Date    Allergic rhinitis     Anxiety     Cervical spine pain 01/03/2019    Gastric reflux 03/30/2016    Headache(784.0)     Hepatitis     Hyperlipidemia 05/31/2018    Hypertension     IBS (irritable bowel syndrome) 12/17/2014    Insomnia     Iron deficiency anemia     Liver disease 12/2008    Lumbar degenerative disc disease 12/13/2021    Mild single current episode of major depressive disorder (HCC) 08/02/2018    Pancreatitis     Right lumbar radiculitis 01/13/2022    Seasonal allergies        Past Surgical History:   Procedure Laterality Date    ANKLE SURGERY  02/23/2014    COSMETIC SURGERY      HAND SURGERY  08/02/2013  Complex Repair And Double M Plasty Text: The defect edges were debeveled with a #15 scalpel blade.  The primary defect was closed partially with a complex linear closure.  Given the location of the remaining defect, shape of the defect and the proximity to free margins a double M plasty was deemed most appropriate for complete closure of the defect.  Using a sterile surgical marker, an appropriate advancement flap was drawn incorporating the defect and placing the expected incisions within the relaxed skin tension lines where possible.    The area thus outlined was incised deep to adipose tissue with a #15 scalpel blade.  The skin margins were undermined to an appropriate distance in all directions utilizing iris scissors.

## 2024-01-08 NOTE — PROGRESS NOTES
Chief Complaint   Patient presents with    Back Pain        PMH       HPI:     Back Pain  This is a chronic problem. The current episode started more than 1 year ago. The problem occurs constantly. The problem is unchanged. The pain is present in the lumbar spine. The quality of the pain is described as aching. The pain radiates to the left knee, left foot and left thigh. The pain is at a severity of 7/10. The symptoms are aggravated by position and lying down. Associated symptoms include leg pain. Pertinent negatives include no bladder incontinence, bowel incontinence or chest pain. He has tried ice, heat and home exercises for the symptoms.        Patient denies any new neurological symptoms. No bowel or bladder incontinence, no weakness, and no falling.           Past Medical History:   Diagnosis Date    Allergic rhinitis     Anxiety     Cervical spine pain 01/03/2019    Gastric reflux 03/30/2016    Headache(784.0)     Hepatitis     Hyperlipidemia 05/31/2018    Hypertension     IBS (irritable bowel syndrome) 12/17/2014    Insomnia     Iron deficiency anemia     Liver disease 12/2008    Lumbar degenerative disc disease 12/13/2021    Mild single current episode of major depressive disorder (HCC) 08/02/2018    Pancreatitis     Right lumbar radiculitis 01/13/2022    Seasonal allergies        Past Surgical History:   Procedure Laterality Date    ANKLE SURGERY  02/23/2014    COSMETIC SURGERY      HAND SURGERY  08/02/2013    URETHRA SURGERY  1985       Allergies   Allergen Reactions    Seasonal      DUST POLLEN         Current Outpatient Medications:     atorvastatin (LIPITOR) 40 MG tablet, Take 1 tablet by mouth daily, Disp: 90 tablet, Rfl: 1    polyethylene glycol (GLYCOLAX) 17 GM/SCOOP powder, Please follow instructions as given to you by your provider, Disp: 238 g, Rfl: 0    bisacodyl 5 MG EC tablet, Please follow instructions as given to you by your provider, Disp: 4 tablet, Rfl: 0    cetirizine (ZYRTEC)

## 2024-01-10 ENCOUNTER — HOSPITAL ENCOUNTER (OUTPATIENT)
Dept: PULMONOLOGY | Age: 49
Discharge: HOME | End: 2024-01-10
Payer: COMMERCIAL

## 2024-01-10 ENCOUNTER — HOSPITAL ENCOUNTER (OUTPATIENT)
Age: 49
Discharge: HOME | End: 2024-01-10
Payer: COMMERCIAL

## 2024-01-10 DIAGNOSIS — R05.3 CHRONIC COUGH: ICD-10-CM

## 2024-01-10 DIAGNOSIS — R55 PRE-SYNCOPE: ICD-10-CM

## 2024-01-10 DIAGNOSIS — D72.821 MONOCYTOSIS: ICD-10-CM

## 2024-01-10 DIAGNOSIS — R06.02 SOB (SHORTNESS OF BREATH): ICD-10-CM

## 2024-01-10 LAB
BASOPHILS # BLD: <0.03 K/UL (ref 0–0.2)
BASOPHILS NFR BLD: 0 % (ref 0–2)
DLCO %PRED: NORMAL
DLCO PRED: NORMAL
DLCO/VA %PRED: NORMAL
DLCO/VA PRED: NORMAL
DLCO/VA: NORMAL
DLCO: NORMAL
EOSINOPHIL # BLD: 0.04 K/UL (ref 0–0.44)
EOSINOPHILS RELATIVE PERCENT: 1 % (ref 1–4)
ERYTHROCYTE [DISTWIDTH] IN BLOOD BY AUTOMATED COUNT: 13.8 % (ref 11.8–14.4)
EXPIRATORY TIME: NORMAL
FEF 25-75% %PRED-PRE: NORMAL
FEF 25-75% PRED: NORMAL
FEF 25-75%-PRE: NORMAL
FEV1 %PRED-PRE: NORMAL
FEV1 PRED: NORMAL
FEV1/FVC %PRED-PRE: NORMAL
FEV1/FVC PRED: NORMAL
FEV1/FVC: NORMAL
FEV1: NORMAL
FVC %PRED-PRE: NORMAL
FVC PRED: NORMAL
FVC: NORMAL
GAW %PRED: NORMAL
GAW PRED: NORMAL
GAW: NORMAL
HCT VFR BLD AUTO: 42.6 % (ref 40.7–50.3)
HGB BLD-MCNC: 14.7 G/DL (ref 13–17)
IC %PRED: NORMAL
IC PRED: NORMAL
IC: NORMAL
IMM GRANULOCYTES # BLD AUTO: <0.03 K/UL (ref 0–0.3)
IMM GRANULOCYTES NFR BLD: 0 %
IMM RETICS NFR: 14.6 % (ref 2.7–18.3)
LYMPHOCYTES NFR BLD: 2.41 K/UL (ref 1.1–3.7)
LYMPHOCYTES RELATIVE PERCENT: 46 % (ref 24–43)
MCH RBC QN AUTO: 31 PG (ref 25.2–33.5)
MCHC RBC AUTO-ENTMCNC: 34.5 G/DL (ref 28.4–34.8)
MCV RBC AUTO: 89.9 FL (ref 82.6–102.9)
MONOCYTES NFR BLD: 0.45 K/UL (ref 0.1–1.2)
MONOCYTES NFR BLD: 9 % (ref 3–12)
MVV %PRED-PRE: NORMAL
MVV PRED: NORMAL
MVV-PRE: NORMAL
NEUTROPHILS NFR BLD: 44 % (ref 36–65)
NEUTS SEG NFR BLD: 2.34 K/UL (ref 1.5–8.1)
NRBC BLD-RTO: 0 PER 100 WBC
PEF %PRED-PRE: NORMAL
PEF PRED: NORMAL
PEF-PRE: NORMAL
PLATELET # BLD AUTO: 296 K/UL (ref 138–453)
PMV BLD AUTO: 10.5 FL (ref 8.1–13.5)
RAW %PRED: NORMAL
RAW PRED: NORMAL
RAW: NORMAL
RBC # BLD AUTO: 4.74 M/UL (ref 4.21–5.77)
RETIC HEMOGLOBIN: 35 PG (ref 28.2–35.7)
RETICS # AUTO: 0.06 M/UL (ref 0.03–0.08)
RETICS/RBC NFR AUTO: 1.3 % (ref 0.5–1.9)
RV %PRED: NORMAL
RV PRED: NORMAL
RV: NORMAL
SVC %PRED: NORMAL
SVC PRED: NORMAL
SVC: NORMAL
TLC %PRED: NORMAL
TLC PRED: NORMAL
TLC: NORMAL
VA %PRED: NORMAL
VA PRED: NORMAL
VA: NORMAL
VTG %PRED: NORMAL
VTG PRED: NORMAL
VTG: NORMAL
WBC OTHER # BLD: 5.3 K/UL (ref 3.5–11.3)

## 2024-01-10 PROCEDURE — 94726 PLETHYSMOGRAPHY LUNG VOLUMES: CPT

## 2024-01-10 PROCEDURE — 88185 FLOWCYTOMETRY/TC ADD-ON: CPT

## 2024-01-10 PROCEDURE — 93005 ELECTROCARDIOGRAM TRACING: CPT

## 2024-01-10 PROCEDURE — 88184 FLOWCYTOMETRY/ TC 1 MARKER: CPT

## 2024-01-10 PROCEDURE — 85025 COMPLETE CBC W/AUTO DIFF WBC: CPT

## 2024-01-10 PROCEDURE — 84155 ASSAY OF PROTEIN SERUM: CPT

## 2024-01-10 PROCEDURE — 85045 AUTOMATED RETICULOCYTE COUNT: CPT

## 2024-01-10 PROCEDURE — 84165 PROTEIN E-PHORESIS SERUM: CPT

## 2024-01-10 PROCEDURE — 84156 ASSAY OF PROTEIN URINE: CPT

## 2024-01-10 PROCEDURE — 6370000000 HC RX 637 (ALT 250 FOR IP): Performed by: FAMILY MEDICINE

## 2024-01-10 PROCEDURE — 94664 DEMO&/EVAL PT USE INHALER: CPT

## 2024-01-10 PROCEDURE — 84166 PROTEIN E-PHORESIS/URINE/CSF: CPT

## 2024-01-10 PROCEDURE — 36415 COLL VENOUS BLD VENIPUNCTURE: CPT

## 2024-01-10 PROCEDURE — 94729 DIFFUSING CAPACITY: CPT

## 2024-01-10 PROCEDURE — 94060 EVALUATION OF WHEEZING: CPT

## 2024-01-10 PROCEDURE — 86335 IMMUNFIX E-PHORSIS/URINE/CSF: CPT

## 2024-01-10 RX ORDER — ALBUTEROL SULFATE 90 UG/1
2 AEROSOL, METERED RESPIRATORY (INHALATION) ONCE
Status: COMPLETED | OUTPATIENT
Start: 2024-01-10 | End: 2024-01-10

## 2024-01-10 RX ADMIN — ALBUTEROL SULFATE 2 PUFF: 90 AEROSOL, METERED RESPIRATORY (INHALATION) at 13:53

## 2024-01-10 NOTE — PROCEDURES
PFT Interpretation:    NORMAL  Lung Mechanics, Volumes,and Diffusion Capacity is noted.  Evidence of air trapping Is noted - most likely a variation of normal.  NO Significant improvement is noted lung mechanics after bronchodilators.      Refugio Quevedo MD

## 2024-01-11 LAB
EKG ATRIAL RATE: 74 BPM
EKG P AXIS: 71 DEGREES
EKG P-R INTERVAL: 124 MS
EKG Q-T INTERVAL: 370 MS
EKG QRS DURATION: 82 MS
EKG QTC CALCULATION (BAZETT): 410 MS
EKG R AXIS: 51 DEGREES
EKG T AXIS: 8 DEGREES
EKG VENTRICULAR RATE: 74 BPM
SURGICAL PATHOLOGY REPORT: NORMAL

## 2024-01-12 LAB
ALBUMIN PERCENT: 67 % (ref 45–65)
ALBUMIN SERPL-MCNC: 4.4 G/DL (ref 3.2–5.2)
ALPHA 2 PERCENT: 10 % (ref 6–13)
ALPHA1 GLOB SERPL ELPH-MCNC: 0.2 G/DL (ref 0.1–0.4)
ALPHA1 GLOB SERPL ELPH-MCNC: 2 % (ref 3–6)
ALPHA2 GLOB SERPL ELPH-MCNC: 0.7 G/DL (ref 0.5–0.9)
B-GLOBULIN SERPL ELPH-MCNC: 0.7 G/DL (ref 0.5–1.1)
B-GLOBULIN SERPL ELPH-MCNC: 11 % (ref 11–19)
FLOW CYTOMETRY BL: NORMAL
GAMMA GLOB SERPL ELPH-MCNC: 0.7 G/DL (ref 0.5–1.5)
GAMMA GLOBULIN %: 10 % (ref 9–20)
P E INTERPRETATION, U: NORMAL
PATHOLOGIST: ABNORMAL
PATHOLOGIST: NORMAL
PROT PATTERN SERPL ELPH-IMP: ABNORMAL
PROT SERPL-MCNC: 6.6 G/DL (ref 6.4–8.3)
SPECIMEN TYPE: NORMAL
SPECIMEN VOL UR: NORMAL ML
TOTAL PROT. SUM,%: 100 % (ref 98–102)
TOTAL PROT. SUM: 6.7 G/DL (ref 6.3–8.2)
URINE IFX INTERP: NORMAL
URINE TOTAL PROTEIN: 35 MG/DL
URINE TOTAL PROTEIN: 35 MG/DL

## 2024-01-24 ENCOUNTER — OFFICE VISIT (OUTPATIENT)
Dept: FAMILY MEDICINE CLINIC | Age: 49
End: 2024-01-24
Payer: COMMERCIAL

## 2024-01-24 VITALS
DIASTOLIC BLOOD PRESSURE: 91 MMHG | TEMPERATURE: 98.2 F | OXYGEN SATURATION: 98 % | SYSTOLIC BLOOD PRESSURE: 147 MMHG | HEART RATE: 85 BPM

## 2024-01-24 DIAGNOSIS — M54.2 NECK PAIN: ICD-10-CM

## 2024-01-24 DIAGNOSIS — K58.0 IRRITABLE BOWEL SYNDROME WITH DIARRHEA: ICD-10-CM

## 2024-01-24 DIAGNOSIS — B96.89 ACUTE BACTERIAL SINUSITIS: Primary | ICD-10-CM

## 2024-01-24 DIAGNOSIS — J01.90 ACUTE BACTERIAL SINUSITIS: Primary | ICD-10-CM

## 2024-01-24 PROCEDURE — 3075F SYST BP GE 130 - 139MM HG: CPT | Performed by: NURSE PRACTITIONER

## 2024-01-24 PROCEDURE — 99213 OFFICE O/P EST LOW 20 MIN: CPT | Performed by: NURSE PRACTITIONER

## 2024-01-24 PROCEDURE — G8417 CALC BMI ABV UP PARAM F/U: HCPCS | Performed by: NURSE PRACTITIONER

## 2024-01-24 PROCEDURE — G8427 DOCREV CUR MEDS BY ELIG CLIN: HCPCS | Performed by: NURSE PRACTITIONER

## 2024-01-24 PROCEDURE — 1036F TOBACCO NON-USER: CPT | Performed by: NURSE PRACTITIONER

## 2024-01-24 PROCEDURE — 3080F DIAST BP >= 90 MM HG: CPT | Performed by: NURSE PRACTITIONER

## 2024-01-24 PROCEDURE — G8482 FLU IMMUNIZE ORDER/ADMIN: HCPCS | Performed by: NURSE PRACTITIONER

## 2024-01-24 RX ORDER — AMOXICILLIN AND CLAVULANATE POTASSIUM 875; 125 MG/1; MG/1
1 TABLET, FILM COATED ORAL 2 TIMES DAILY
Qty: 20 TABLET | Refills: 0 | Status: SHIPPED | OUTPATIENT
Start: 2024-01-24 | End: 2024-02-03

## 2024-01-24 RX ORDER — PREDNISONE 20 MG/1
20 TABLET ORAL 2 TIMES DAILY
Qty: 10 TABLET | Refills: 0 | Status: SHIPPED | OUTPATIENT
Start: 2024-01-24 | End: 2024-01-29

## 2024-01-24 RX ORDER — CITALOPRAM HYDROBROMIDE 10 MG/1
10 TABLET ORAL DAILY
Qty: 30 TABLET | Refills: 2 | Status: SHIPPED | OUTPATIENT
Start: 2024-01-24

## 2024-01-24 RX ORDER — METHOCARBAMOL 500 MG/1
500 TABLET, FILM COATED ORAL 3 TIMES DAILY PRN
Qty: 30 TABLET | Refills: 0 | Status: SHIPPED | OUTPATIENT
Start: 2024-01-24 | End: 2024-02-03

## 2024-01-24 ASSESSMENT — ENCOUNTER SYMPTOMS
SHORTNESS OF BREATH: 0
SINUS COMPLAINT: 1
COUGH: 0
SWOLLEN GLANDS: 0
SORE THROAT: 0
SINUS PRESSURE: 1
HOARSE VOICE: 0
RESPIRATORY NEGATIVE: 1

## 2024-01-24 NOTE — PROGRESS NOTES
Musculoskeletal:         General: No deformity. Normal range of motion.      Cervical back: Normal range of motion and neck supple. Tenderness present. No rigidity.      Comments: Ambulated to and from room, gait steady, moving all ext without difficulty   Lymphadenopathy:      Cervical: No cervical adenopathy.   Skin:     General: Skin is warm and dry.      Capillary Refill: Capillary refill takes less than 2 seconds.      Findings: No rash ( no rash to visible skin).   Neurological:      General: No focal deficit present.      Mental Status: He is alert and oriented to person, place, and time.      Motor: No abnormal muscle tone.      Coordination: Coordination normal.   Psychiatric:         Mood and Affect: Mood normal.         Behavior: Behavior normal.       BP (!) 147/91   Pulse 85   Temp 98.2 °F (36.8 °C) (Infrared)   SpO2 98%     Assessment:       Diagnosis Orders   1. Acute bacterial sinusitis        2. Neck pain            Plan:    Based on sx severity and duration will tx as bacterial sinusitis  Cont home flonase  Aug rx  Pred rx  Acute on chronic neck pain  Robaxin rx  - s.e. reviewed  Ice/heat  Tyl/motrin otc  Reviewed over-the-counter treatments for symptom management.  Return worse  Pt verbalized agreement and understanding of POC    Return for make appt with Family Doc in 3-4 days for recheck.    Orders Placed This Encounter   Medications    amoxicillin-clavulanate (AUGMENTIN) 875-125 MG per tablet     Sig: Take 1 tablet by mouth 2 times daily for 10 days     Dispense:  20 tablet     Refill:  0    predniSONE (DELTASONE) 20 MG tablet     Sig: Take 1 tablet by mouth 2 times daily for 5 days     Dispense:  10 tablet     Refill:  0    methocarbamol (ROBAXIN) 500 MG tablet     Sig: Take 1 tablet by mouth 3 times daily as needed (muscle spasming)     Dispense:  30 tablet     Refill:  0         Patient given educational materials - see patient instructions.  Discussed use, benefit, and side effects of

## 2024-01-24 NOTE — TELEPHONE ENCOUNTER
Please Approve or Refuse.  Send to Pharmacy per Pt's Request:      Next Visit Date:  4/18/2024   Last Visit Date: 1/8/2024    Hemoglobin A1C (%)   Date Value   08/18/2023 5.8   11/08/2022 5.7   07/27/2022 5.8             ( goal A1C is < 7)   BP Readings from Last 3 Encounters:   12/30/23 (!) 122/90   12/18/23 120/86   10/21/23 (!) 123/101          (goal 120/80)  BUN   Date Value Ref Range Status   08/18/2023 11 6 - 20 mg/dL Final     Creatinine   Date Value Ref Range Status   08/18/2023 0.8 0.7 - 1.2 mg/dL Final     Potassium   Date Value Ref Range Status   08/18/2023 4.3 3.7 - 5.3 mmol/L Final

## 2024-01-29 ENCOUNTER — TELEPHONE (OUTPATIENT)
Dept: FAMILY MEDICINE CLINIC | Age: 49
End: 2024-01-29

## 2024-01-29 DIAGNOSIS — M47.817 LUMBOSACRAL SPONDYLOSIS WITHOUT MYELOPATHY: ICD-10-CM

## 2024-01-29 DIAGNOSIS — R55 PRE-SYNCOPE: Primary | ICD-10-CM

## 2024-01-29 NOTE — TELEPHONE ENCOUNTER
[facility] Clinical Support Pool  Subject: Referral Request    Reason for referral request? Patient phoned needs a referral to neurology  (advised by pain management to get this referral) within [facility];  please call patient when order is written  Provider patient wants to be referred to(if known):    Provider Phone Number(if known):    Additional Information for Provider?  ---------------------------------------------------------------------------  --------------  CALL BACK INFO    [phone]; OK to leave message on voicemail

## 2024-02-02 ENCOUNTER — TELEPHONE (OUTPATIENT)
Dept: PAIN MANAGEMENT | Age: 49
End: 2024-02-02

## 2024-02-02 NOTE — TELEPHONE ENCOUNTER
Left VM regarding appt. Change due to provider being out of the office moving from 4/3 to 3/27 same time 11:40

## 2024-02-06 ENCOUNTER — TELEPHONE (OUTPATIENT)
Dept: PAIN MANAGEMENT | Age: 49
End: 2024-02-06

## 2024-02-06 NOTE — TELEPHONE ENCOUNTER
Called pt left VM regarding order for back brace- pt requested we send to [facility] [place] let pt know we need a new location to send order to

## 2024-02-12 ENCOUNTER — TELEPHONE (OUTPATIENT)
Dept: PAIN MANAGEMENT | Age: 49
End: 2024-02-12

## 2024-02-27 ENCOUNTER — TELEPHONE (OUTPATIENT)
Dept: GASTROENTEROLOGY | Age: 49
End: 2024-02-27

## 2024-02-27 NOTE — TELEPHONE ENCOUNTER
Called patient to schedule from the workqueue no answer lft vm to call office back. Also sent letter as a second attempt.IVA

## 2024-03-26 ENCOUNTER — TELEPHONE (OUTPATIENT)
Dept: GASTROENTEROLOGY | Age: 49
End: 2024-03-26

## 2024-03-26 NOTE — TELEPHONE ENCOUNTER
Pt called back in to [facility] canceled colonoscopy he had with Dr. Russell on 01/18/24. Is asking for an appt on Friday if possible due to work.      Please call pt back @ [phone]

## 2024-03-27 ENCOUNTER — HOSPITAL ENCOUNTER (OUTPATIENT)
Dept: PAIN MANAGEMENT | Age: 49
Discharge: HOME | End: 2024-03-27
Payer: COMMERCIAL

## 2024-03-27 VITALS — WEIGHT: 185 LBS | HEIGHT: 67 IN | BODY MASS INDEX: 29.03 KG/M2

## 2024-03-27 DIAGNOSIS — M51.36 LUMBAR DEGENERATIVE DISC DISEASE: ICD-10-CM

## 2024-03-27 DIAGNOSIS — M54.16 LUMBAR RADICULOPATHY: Primary | ICD-10-CM

## 2024-03-27 DIAGNOSIS — G89.29 CHRONIC BILATERAL LOW BACK PAIN WITHOUT SCIATICA: ICD-10-CM

## 2024-03-27 DIAGNOSIS — M47.817 LUMBOSACRAL SPONDYLOSIS WITHOUT MYELOPATHY: ICD-10-CM

## 2024-03-27 DIAGNOSIS — M54.2 CERVICAL SPINE PAIN: ICD-10-CM

## 2024-03-27 DIAGNOSIS — I10 ESSENTIAL HYPERTENSION: ICD-10-CM

## 2024-03-27 DIAGNOSIS — M51.26 LUMBAR DISC HERNIATION: Chronic | ICD-10-CM

## 2024-03-27 DIAGNOSIS — M54.50 CHRONIC BILATERAL LOW BACK PAIN WITHOUT SCIATICA: ICD-10-CM

## 2024-03-27 PROCEDURE — 99213 OFFICE O/P EST LOW 20 MIN: CPT | Performed by: NURSE PRACTITIONER

## 2024-03-27 PROCEDURE — 99213 OFFICE O/P EST LOW 20 MIN: CPT

## 2024-03-27 RX ORDER — LOSARTAN POTASSIUM 50 MG/1
TABLET ORAL
Qty: 135 TABLET | Refills: 3 | Status: SHIPPED | OUTPATIENT
Start: 2024-03-27

## 2024-03-27 RX ORDER — PREGABALIN 25 MG/1
25 CAPSULE ORAL 2 TIMES DAILY
Qty: 60 CAPSULE | Refills: 5 | Status: SHIPPED | OUTPATIENT
Start: 2024-03-27 | End: 2024-09-23

## 2024-03-27 ASSESSMENT — ENCOUNTER SYMPTOMS
BACK PAIN: 1
SHORTNESS OF BREATH: 0
BOWEL INCONTINENCE: 0
COUGH: 0
CONSTIPATION: 0

## 2024-03-27 ASSESSMENT — PAIN DESCRIPTION - LOCATION: LOCATION: BACK

## 2024-03-27 ASSESSMENT — PAIN SCALES - GENERAL: PAINLEVEL_OUTOF10: 5

## 2024-03-27 NOTE — PROGRESS NOTES
Chief Complaint   Patient presents with    Back Pain    Medication Refill         [facility]  Pt complains of low back pain. He had a work related injury about one and a half years ago. MRI lumbar with L4-L5 grade 1 retrolisthesis and mild bilateral neural foraminal narrowing. Left foraminal disc extrusion with superior migration contacts exiting left L4 nerve root.  He has never had injections or surgery to the lumbar area. He is currently in PT and does have some soreness following 3 sessions. Currently taking Lyrica -25 mg BID. Discussed trying lumbar steroid epidural and he declines.   He will be getting a back brace from [employer] this week.    Following with neurology for dizziness         Back Pain  This is a chronic problem. The current episode started more than 1 year ago. The problem occurs constantly. The problem is unchanged. The pain is present in the lumbar spine and thoracic spine. The quality of the pain is described as aching. The pain does not radiate. The pain is at a severity of 5/10. The pain is moderate. The pain is Worse during the night. The symptoms are aggravated by position, twisting, bending, lying down and sitting. Stiffness is present In the morning. Pertinent negatives include no bladder incontinence, bowel incontinence, chest pain or fever. He has tried home exercises, bed rest, walking, chiropractic manipulation, ice, heat, muscle relaxant, NSAIDs and analgesics for the symptoms. The treatment provided mild relief.     Patient denies any new neurological symptoms. No bowel or bladder incontinence, no weakness, and no falling.    Past Medical History:   Diagnosis Date    Allergic rhinitis     Anxiety     Cervical spine pain 01/03/2019    Gastric reflux 03/30/2016    Headache(784.0)     Hepatitis     Hyperlipidemia 05/31/2018    Hypertension     IBS (irritable bowel syndrome) 12/17/2014    Insomnia     Iron deficiency anemia     Liver disease 12/2008    Lumbar degenerative disc

## 2024-03-28 ENCOUNTER — OFFICE VISIT (OUTPATIENT)
Dept: NEUROLOGY | Age: 49
End: 2024-03-28
Payer: COMMERCIAL

## 2024-03-28 VITALS
BODY MASS INDEX: 28.72 KG/M2 | SYSTOLIC BLOOD PRESSURE: 115 MMHG | WEIGHT: 183 LBS | HEART RATE: 66 BPM | HEIGHT: 67 IN | DIASTOLIC BLOOD PRESSURE: 78 MMHG

## 2024-03-28 DIAGNOSIS — M54.42 CHRONIC BILATERAL LOW BACK PAIN WITH LEFT-SIDED SCIATICA: ICD-10-CM

## 2024-03-28 DIAGNOSIS — R55 SYNCOPE, UNSPECIFIED SYNCOPE TYPE: Primary | ICD-10-CM

## 2024-03-28 DIAGNOSIS — R20.2 PARESTHESIAS: ICD-10-CM

## 2024-03-28 DIAGNOSIS — G89.29 CHRONIC BILATERAL LOW BACK PAIN WITH LEFT-SIDED SCIATICA: ICD-10-CM

## 2024-03-28 PROCEDURE — 3078F DIAST BP <80 MM HG: CPT | Performed by: PHYSICIAN ASSISTANT

## 2024-03-28 PROCEDURE — G8417 CALC BMI ABV UP PARAM F/U: HCPCS | Performed by: PHYSICIAN ASSISTANT

## 2024-03-28 PROCEDURE — G8427 DOCREV CUR MEDS BY ELIG CLIN: HCPCS | Performed by: PHYSICIAN ASSISTANT

## 2024-03-28 PROCEDURE — 1036F TOBACCO NON-USER: CPT | Performed by: PHYSICIAN ASSISTANT

## 2024-03-28 PROCEDURE — 99204 OFFICE O/P NEW MOD 45 MIN: CPT | Performed by: PHYSICIAN ASSISTANT

## 2024-03-28 PROCEDURE — G8482 FLU IMMUNIZE ORDER/ADMIN: HCPCS | Performed by: PHYSICIAN ASSISTANT

## 2024-03-28 PROCEDURE — 3074F SYST BP LT 130 MM HG: CPT | Performed by: PHYSICIAN ASSISTANT

## 2024-03-28 NOTE — PROGRESS NOTES
ROM adequate   Lungs:   Respirations unlabored   Heart:  Regular rate    Extremities: Extremities normal, no cyanosis, no edema   Skin: Skin color, texture normal, no rashes, no lesions       Mental status    Alert and oriented x 3; intact memory with no confusion, speech or language problems; no hallucinations or delusions     Cranial nerves    II - visual fields intact to confrontation  III, IV, VI - extra-ocular muscles full: no pupillary defect; no MARLIN, no nystagmus, no ptosis   V - normal facial sensation                                                               VII - normal facial symmetry                                                             VIII - intact hearing                                                                             IX, X - symmetrical palate                                                                  XI - symmetrical shoulder shrug                                                       XII - tongue midline without atrophy or fasciculation      Motor function  Normal muscle bulk and tone; strength 5/5 on all 4 extremities, no pronator drift   negative straight leg raise   Sensory function Intact to light touch, pinprick, vibration, on all 4 extremities      Cerebellar Intact fine motor movement. No involuntary movements or tremors. No ataxia or dysmetria on finger to nose or heel to shin testing      Reflex function DTR 2+ on bilateral UE and LE, symmetric.    no clonus, negative smith's     Gait                   left leg lag        ASSESSMENT / PLAN:   John Armenta is a 49 y.o. male that established care with neurology for syncope, left-sided sciatica, paresthesias of left upper ext.    Symptoms of dizziness seem most consistent with near-syncope and there have been 3 syncopal episodes- relevant workup ordered as listed blow.    Separately, there is low back pain with radiation down the left side. Strength is intact, although he has a gait disturbance which may be

## 2024-04-02 ENCOUNTER — TELEPHONE (OUTPATIENT)
Dept: GASTROENTEROLOGY | Age: 49
End: 2024-04-02

## 2024-04-02 NOTE — TELEPHONE ENCOUNTER
Pt would like to reschedule his colonoscopy originally scheduled for 1/18/24 with Dr. Russell.  Pt states he would like a Friday if possible.  Please call pt to schedule.

## 2024-04-10 DIAGNOSIS — J30.2 SEASONAL ALLERGIES: ICD-10-CM

## 2024-04-10 RX ORDER — CETIRIZINE HYDROCHLORIDE 10 MG/1
TABLET ORAL
Qty: 30 TABLET | Refills: 1 | Status: SHIPPED | OUTPATIENT
Start: 2024-04-10

## 2024-04-10 NOTE — TELEPHONE ENCOUNTER
Please Approve or Refuse.  Send to Pharmacy per Pt's Request:      Next Visit Date:  4/18/2024   Last Visit Date: 1/8/2024    Hemoglobin A1C (%)   Date Value   08/18/2023 5.8   11/08/2022 5.7   07/27/2022 5.8             ( goal A1C is < 7)   BP Readings from Last 3 Encounters:   03/28/24 115/78   01/24/24 (!) 147/91   12/30/23 (!) 122/90          (goal 120/80)  BUN   Date Value Ref Range Status   08/18/2023 11 6 - 20 mg/dL Final     Creatinine   Date Value Ref Range Status   08/18/2023 0.8 0.7 - 1.2 mg/dL Final     Potassium   Date Value Ref Range Status   08/18/2023 4.3 3.7 - 5.3 mmol/L Final

## 2024-04-18 ENCOUNTER — OFFICE VISIT (OUTPATIENT)
Dept: FAMILY MEDICINE CLINIC | Age: 49
End: 2024-04-18
Payer: COMMERCIAL

## 2024-04-18 VITALS
HEART RATE: 84 BPM | SYSTOLIC BLOOD PRESSURE: 130 MMHG | DIASTOLIC BLOOD PRESSURE: 80 MMHG | OXYGEN SATURATION: 98 % | HEIGHT: 67 IN | WEIGHT: 175 LBS | BODY MASS INDEX: 27.47 KG/M2

## 2024-04-18 DIAGNOSIS — Z78.9 STATIN INTOLERANCE: ICD-10-CM

## 2024-04-18 DIAGNOSIS — D72.820 LYMPHOCYTOSIS: ICD-10-CM

## 2024-04-18 DIAGNOSIS — D72.821 MONOCYTOSIS: ICD-10-CM

## 2024-04-18 DIAGNOSIS — E78.2 MIXED HYPERLIPIDEMIA: ICD-10-CM

## 2024-04-18 DIAGNOSIS — F32.0 MILD SINGLE CURRENT EPISODE OF MAJOR DEPRESSIVE DISORDER (HCC): ICD-10-CM

## 2024-04-18 DIAGNOSIS — R55 PRE-SYNCOPE: ICD-10-CM

## 2024-04-18 DIAGNOSIS — R73.03 PREDIABETES: ICD-10-CM

## 2024-04-18 DIAGNOSIS — I10 ESSENTIAL HYPERTENSION: Primary | ICD-10-CM

## 2024-04-18 DIAGNOSIS — M47.817 LUMBOSACRAL SPONDYLOSIS WITHOUT MYELOPATHY: ICD-10-CM

## 2024-04-18 PROBLEM — D70.9 NEUTROPENIA (HCC): Status: RESOLVED | Noted: 2023-12-18 | Resolved: 2024-04-18

## 2024-04-18 PROCEDURE — G8427 DOCREV CUR MEDS BY ELIG CLIN: HCPCS | Performed by: FAMILY MEDICINE

## 2024-04-18 PROCEDURE — 1036F TOBACCO NON-USER: CPT | Performed by: FAMILY MEDICINE

## 2024-04-18 PROCEDURE — G8417 CALC BMI ABV UP PARAM F/U: HCPCS | Performed by: FAMILY MEDICINE

## 2024-04-18 PROCEDURE — 3075F SYST BP GE 130 - 139MM HG: CPT | Performed by: FAMILY MEDICINE

## 2024-04-18 PROCEDURE — 3079F DIAST BP 80-89 MM HG: CPT | Performed by: FAMILY MEDICINE

## 2024-04-18 PROCEDURE — 99214 OFFICE O/P EST MOD 30 MIN: CPT | Performed by: FAMILY MEDICINE

## 2024-04-18 SDOH — ECONOMIC STABILITY: FOOD INSECURITY: WITHIN THE PAST 12 MONTHS, THE FOOD YOU BOUGHT JUST DIDN'T LAST AND YOU DIDN'T HAVE MONEY TO GET MORE.: NEVER TRUE

## 2024-04-18 SDOH — ECONOMIC STABILITY: INCOME INSECURITY: HOW HARD IS IT FOR YOU TO PAY FOR THE VERY BASICS LIKE FOOD, HOUSING, MEDICAL CARE, AND HEATING?: NOT HARD AT ALL

## 2024-04-18 SDOH — ECONOMIC STABILITY: FOOD INSECURITY: WITHIN THE PAST 12 MONTHS, YOU WORRIED THAT YOUR FOOD WOULD RUN OUT BEFORE YOU GOT MONEY TO BUY MORE.: NEVER TRUE

## 2024-04-18 ASSESSMENT — ENCOUNTER SYMPTOMS
COUGH: 0
STRIDOR: 0
RHINORRHEA: 0
CHEST TIGHTNESS: 0
NAUSEA: 0
BACK PAIN: 1
EYE REDNESS: 0
WHEEZING: 0
ABDOMINAL PAIN: 0
TROUBLE SWALLOWING: 0
CONSTIPATION: 0
DIARRHEA: 0
BLOOD IN STOOL: 0
SINUS PRESSURE: 0
RECTAL PAIN: 0
COLOR CHANGE: 0
ABDOMINAL DISTENTION: 0
SORE THROAT: 0
SHORTNESS OF BREATH: 0
VOMITING: 0

## 2024-04-18 ASSESSMENT — PATIENT HEALTH QUESTIONNAIRE - PHQ9
SUM OF ALL RESPONSES TO PHQ9 QUESTIONS 1 & 2: 0
8. MOVING OR SPEAKING SO SLOWLY THAT OTHER PEOPLE COULD HAVE NOTICED. OR THE OPPOSITE, BEING SO FIGETY OR RESTLESS THAT YOU HAVE BEEN MOVING AROUND A LOT MORE THAN USUAL: NOT AT ALL
SUM OF ALL RESPONSES TO PHQ QUESTIONS 1-9: 0
SUM OF ALL RESPONSES TO PHQ QUESTIONS 1-9: 0
10. IF YOU CHECKED OFF ANY PROBLEMS, HOW DIFFICULT HAVE THESE PROBLEMS MADE IT FOR YOU TO DO YOUR WORK, TAKE CARE OF THINGS AT HOME, OR GET ALONG WITH OTHER PEOPLE: NOT DIFFICULT AT ALL
SUM OF ALL RESPONSES TO PHQ QUESTIONS 1-9: 0
7. TROUBLE CONCENTRATING ON THINGS, SUCH AS READING THE NEWSPAPER OR WATCHING TELEVISION: NOT AT ALL
1. LITTLE INTEREST OR PLEASURE IN DOING THINGS: NOT AT ALL
4. FEELING TIRED OR HAVING LITTLE ENERGY: NOT AT ALL
SUM OF ALL RESPONSES TO PHQ QUESTIONS 1-9: 0
9. THOUGHTS THAT YOU WOULD BE BETTER OFF DEAD, OR OF HURTING YOURSELF: NOT AT ALL
5. POOR APPETITE OR OVEREATING: NOT AT ALL
6. FEELING BAD ABOUT YOURSELF - OR THAT YOU ARE A FAILURE OR HAVE LET YOURSELF OR YOUR FAMILY DOWN: NOT AT ALL
3. TROUBLE FALLING OR STAYING ASLEEP: NOT AT ALL
2. FEELING DOWN, DEPRESSED OR HOPELESS: NOT AT ALL

## 2024-04-18 NOTE — PROGRESS NOTES
Visit Information    Have you changed or started any medications since your last visit including any over-the-counter medicines, vitamins, or herbal medicines? no   Are you having any side effects from any of your medications? -  no  Have you stopped taking any of your medications? Is so, why? -  no    Have you seen any other physician or provider since your last visit? No  Have you had any other diagnostic tests since your last visit? No  Have you been seen in the emergency room and/or had an admission to a hospital since we last saw you? No  Have you had your routine dental cleaning in the past 6 months? no    Have you activated your [employer] account? If not, what are your barriers? Yes     Patient Care Team:  Wan Ordonez MD as PCP - General (Family Medicine)  Wan Ordonez MD as PCP - Empaneled Provider    Medical History Review  Past Medical, Family, and Social History reviewed and does contribute to the patient presenting condition    Health Maintenance   Topic Date Due    Hepatitis B vaccine (1 of 3 - 3-dose series) Never done    Colorectal Cancer Screen  09/01/2019    A1C test (Diabetic or Prediabetic)  08/18/2024    Depression Monitoring  08/18/2024    DTaP/Tdap/Td vaccine (2 - Td or Tdap) 05/31/2028    Lipids  08/18/2028    Flu vaccine  Completed    COVID-19 Vaccine  Completed    Hepatitis C screen  Completed    HIV screen  Completed    Hepatitis A vaccine  Aged Out    Hib vaccine  Aged Out    Polio vaccine  Aged Out    Meningococcal (ACWY) vaccine  Aged Out    Pneumococcal 0-64 years Vaccine  Aged Out     
10-14 = Moderate depression, 15-19 = Moderately severe depression, 20-27 = Severe depression    The patient'spast medical, surgical, social, and family history as well as his   current medications and allergies were reviewed as documented in today's encounter.      Medications, labs, diagnostic studies, consultations andfollow-up as documented in this encounter.    Return in about 4 months (around 8/18/2024) for annual exam 30min.    Patient wasseen with total face to face time of 35 minutes. More than 50% of this visit was counseling and education.       Future Appointments   Date Time Provider Department Center   4/22/2024  1:45 PM STC MRI  [facility] MRI [facility] Radiolog   4/22/2024  2:30 PM STC VASCULAR 2 [facility] VASCLAB [facility] Radiolog   5/30/2024  7:40 AM Loreto Lima PA Neuro Spec Neurology -   6/26/2024  8:00 AM Elijah Wheeler DO [facility] PAINMGT [place]   8/8/2024  3:30 PM [facility] EMG  [facility] EMG [place]   8/8/2024  3:30 PM Leonidas Mauricio MD [facility] med/reha [facility]   8/20/2024  9:00 AM Wan Ordonez MD [facility]     This note was completed by using the assistance of a speech-recognition program. However, inadvertent computerized transcription errors may be present. Althoughevery effort was made to ensure accuracy, no guarantees can be provided that every mistake has been identified and corrected by editing.  Electronically signed by Wan Ordonez MD on 4/18/2024  10:01 AM

## 2024-04-18 NOTE — PATIENT INSTRUCTIONS
results and important health updates, keeping you well-informed and engaged in your healthcare journey.    5. Increased engagement and collaboration: Direct scheduling encourages greater patient engagement and empowers you to take an active role in managing your healthcare. By accessing the [employer] Patient Portal, you can securely message your healthcare provider, ask questions, request prescription refills, and seek medical advice whenever you need it.    To get started with direct scheduling through the [employer] Patient Portal or to register with [employer], simply visit [email or web address].     We understand that change can sometimes be overwhelming, but we assure you that adopting direct scheduling through the [employer] Patient Portal will enhance your healthcare experience and put you in control of your appointments. Our dedicated staff is available to answer any questions or provide assistance throughout the process.    Thank you for entrusting us with your healthcare needs. We are committed to continuously improving our services and ensuring your satisfaction. Together, let's embrace the future of healthcare convenience and accessibility through direct scheduling on the [employer] Patient Portal.    Wishing you good health and happiness,    [] Thank you for choosing [facility] as your healthcare provider as your care is important to us.  Please arrive at your appointment on time. If you are unable to make your appointment, please call our office as soon as possible so that we may reschedule your appointment.  Missing 3 appointments in a calendar year without notifying the office may lead to dismissal from the practice.  We appreciate you calling at least 24 hours in advance, when possible. Thank you.

## 2024-04-22 ENCOUNTER — HOSPITAL ENCOUNTER (OUTPATIENT)
Dept: MRI IMAGING | Age: 49
Discharge: HOME | End: 2024-04-24
Payer: COMMERCIAL

## 2024-04-22 ENCOUNTER — HOSPITAL ENCOUNTER (OUTPATIENT)
Dept: VASCULAR LAB | Age: 49
Discharge: HOME | End: 2024-04-24
Payer: COMMERCIAL

## 2024-04-22 DIAGNOSIS — R20.2 PARESTHESIAS: ICD-10-CM

## 2024-04-22 DIAGNOSIS — R55 SYNCOPE, UNSPECIFIED SYNCOPE TYPE: ICD-10-CM

## 2024-04-22 DIAGNOSIS — M54.42 CHRONIC BILATERAL LOW BACK PAIN WITH LEFT-SIDED SCIATICA: ICD-10-CM

## 2024-04-22 DIAGNOSIS — G89.29 CHRONIC BILATERAL LOW BACK PAIN WITH LEFT-SIDED SCIATICA: ICD-10-CM

## 2024-04-22 LAB
VAS LEFT BULB EDV: 18.6 CM/S
VAS LEFT BULB PSV: 84.6 CM/S
VAS LEFT CCA DIST EDV: 22.2 CM/S
VAS LEFT CCA DIST PSV: 96 CM/S
VAS LEFT CCA MID EDV: 15 CM/S
VAS LEFT CCA MID PSV: 87.6 CM/S
VAS LEFT CCA PROX EDV: 18.6 CM/S
VAS LEFT CCA PROX PSV: 95.4 CM/S
VAS LEFT ECA EDV: 19.2 CM/S
VAS LEFT ECA PSV: 88.2 CM/S
VAS LEFT ICA DIST EDV: 42.2 CM/S
VAS LEFT ICA DIST PSV: 91.3 CM/S
VAS LEFT ICA MID EDV: 38.5 CM/S
VAS LEFT ICA MID PSV: 86.4 CM/S
VAS LEFT ICA PROX EDV: 5.8 CM/S
VAS LEFT ICA PROX PSV: 61.5 CM/S
VAS LEFT VERTEBRAL EDV: 15.5 CM/S
VAS LEFT VERTEBRAL PSV: 65.9 CM/S
VAS RIGHT BULB EDV: 23.1 CM/S
VAS RIGHT BULB PSV: 89.8 CM/S
VAS RIGHT CCA DIST EDV: 18.7 CM/S
VAS RIGHT CCA DIST PSV: 77.8 CM/S
VAS RIGHT CCA MID EDV: 19 CM/S
VAS RIGHT CCA MID PSV: 90.4 CM/S
VAS RIGHT CCA PROX EDV: 14.3 CM/S
VAS RIGHT CCA PROX PSV: 93.1 CM/S
VAS RIGHT ECA EDV: 9.6 CM/S
VAS RIGHT ECA PSV: 62.4 CM/S
VAS RIGHT ICA DIST EDV: 24 CM/S
VAS RIGHT ICA DIST PSV: 75 CM/S
VAS RIGHT ICA MID EDV: 31.7 CM/S
VAS RIGHT ICA MID PSV: 76.9 CM/S
VAS RIGHT ICA PROX EDV: 19.2 CM/S
VAS RIGHT ICA PROX PSV: 98.8 CM/S
VAS RIGHT VERTEBRAL EDV: 15.4 CM/S
VAS RIGHT VERTEBRAL PSV: 52.8 CM/S

## 2024-04-22 PROCEDURE — 6360000004 HC RX CONTRAST MEDICATION: Performed by: PHYSICIAN ASSISTANT

## 2024-04-22 PROCEDURE — 70553 MRI BRAIN STEM W/O & W/DYE: CPT

## 2024-04-22 PROCEDURE — A9579 GAD-BASE MR CONTRAST NOS,1ML: HCPCS | Performed by: PHYSICIAN ASSISTANT

## 2024-04-22 PROCEDURE — 93880 EXTRACRANIAL BILAT STUDY: CPT | Performed by: SURGERY

## 2024-04-22 PROCEDURE — 93880 EXTRACRANIAL BILAT STUDY: CPT

## 2024-04-22 PROCEDURE — 2580000003 HC RX 258: Performed by: PHYSICIAN ASSISTANT

## 2024-04-22 RX ORDER — SODIUM CHLORIDE 0.9 % (FLUSH) 0.9 %
10 SYRINGE (ML) INJECTION PRN
Status: DISCONTINUED | OUTPATIENT
Start: 2024-04-22 | End: 2024-04-25 | Stop reason: HOSPADM

## 2024-04-22 RX ADMIN — GADOTERIDOL 15 ML: 279.3 INJECTION, SOLUTION INTRAVENOUS at 14:41

## 2024-04-22 RX ADMIN — SODIUM CHLORIDE, PRESERVATIVE FREE 10 ML: 5 INJECTION INTRAVENOUS at 14:41

## 2024-04-29 ENCOUNTER — TELEPHONE (OUTPATIENT)
Dept: NEUROLOGY | Age: 49
End: 2024-04-29

## 2024-05-01 ENCOUNTER — TELEPHONE (OUTPATIENT)
Dept: GASTROENTEROLOGY | Age: 49
End: 2024-05-01

## 2024-05-11 DIAGNOSIS — K58.0 IRRITABLE BOWEL SYNDROME WITH DIARRHEA: ICD-10-CM

## 2024-05-13 RX ORDER — CITALOPRAM HYDROBROMIDE 10 MG/1
10 TABLET ORAL DAILY
Qty: 30 TABLET | Refills: 1 | Status: SHIPPED | OUTPATIENT
Start: 2024-05-13

## 2024-05-30 ENCOUNTER — OFFICE VISIT (OUTPATIENT)
Dept: NEUROLOGY | Age: 49
End: 2024-05-30
Payer: COMMERCIAL

## 2024-05-30 VITALS
BODY MASS INDEX: 26.98 KG/M2 | DIASTOLIC BLOOD PRESSURE: 84 MMHG | SYSTOLIC BLOOD PRESSURE: 120 MMHG | HEART RATE: 74 BPM | HEIGHT: 68 IN | WEIGHT: 178 LBS

## 2024-05-30 DIAGNOSIS — M54.42 CHRONIC BILATERAL LOW BACK PAIN WITH LEFT-SIDED SCIATICA: ICD-10-CM

## 2024-05-30 DIAGNOSIS — G89.29 CHRONIC BILATERAL LOW BACK PAIN WITH LEFT-SIDED SCIATICA: ICD-10-CM

## 2024-05-30 DIAGNOSIS — R55 SYNCOPE, UNSPECIFIED SYNCOPE TYPE: Primary | ICD-10-CM

## 2024-05-30 DIAGNOSIS — R20.2 PARESTHESIAS: ICD-10-CM

## 2024-05-30 PROCEDURE — 99213 OFFICE O/P EST LOW 20 MIN: CPT | Performed by: PHYSICIAN ASSISTANT

## 2024-05-30 PROCEDURE — G8427 DOCREV CUR MEDS BY ELIG CLIN: HCPCS | Performed by: PHYSICIAN ASSISTANT

## 2024-05-30 PROCEDURE — 3079F DIAST BP 80-89 MM HG: CPT | Performed by: PHYSICIAN ASSISTANT

## 2024-05-30 PROCEDURE — 1036F TOBACCO NON-USER: CPT | Performed by: PHYSICIAN ASSISTANT

## 2024-05-30 PROCEDURE — 3074F SYST BP LT 130 MM HG: CPT | Performed by: PHYSICIAN ASSISTANT

## 2024-05-30 PROCEDURE — G8417 CALC BMI ABV UP PARAM F/U: HCPCS | Performed by: PHYSICIAN ASSISTANT

## 2024-05-30 NOTE — PROGRESS NOTES
[address] [facility] [address]  [facility] [address]  Dept: [phone]    PATIENT NAME: John Armenta  PATIENT MRN: [id number]  PRIMARY CARE PHYSICIAN: Wan Ordonez MD    HPI:      John Armenta is a 49 y.o. male who returns to clinic today for evaluation of  low back pain with sciatica and syncope. He had a low back work related injury in 2021. He follows with pain management, PT.    Carotid duplex April 2024:   No stenosis in the right internal carotid artery.    No stenosis in the left internal carotid artery.    Normal antegrade flow involving the right vertebral artery.    Normal antegrade flow involving the left vertebral artery.    MRI brain w wo April 2024: No acute intracranial abnormality. No abnormal postcontrast enhancement.     EMG is pending.    No further syncopal episodes, falls, or slips. He has changed his exercise regimen and plans to follow with ortho regarding previous injuries which were not addressed initially.    Prior information:    He has had 3 syncopal episodes recently Nov, Dec 2023 then Jan 2024. However, lightheadedness has persisted since 2022. Reports at the time he had been ill with a respiratory infection. Most recent fall was proceeded by a lightheaded sensation with flashes in his vision. He then passed out and fell to the ground and struck the back of his head. Denies vertigo. He has continued productive cough, which does not cause syncope. Recent PFT. He has never passed out when sitting, he is always standing. Transitioning from seated to standing position has caused lightheadedness and there was one episode of syncope. On occasion at work with quick rotation, turns he feels light headed.    No convulsion, tongue-biting, incontinence, focal weakness.     Reports that since the injury in July 2021 he has issues with low back pain with left leg paresthesias. Reports that left calf is atrophied, but he ruptured his achilles on the left side.     He previously was

## 2024-06-14 DIAGNOSIS — J30.2 SEASONAL ALLERGIES: ICD-10-CM

## 2024-06-14 RX ORDER — CETIRIZINE HYDROCHLORIDE 10 MG/1
TABLET ORAL
Qty: 90 TABLET | Refills: 0 | Status: SHIPPED | OUTPATIENT
Start: 2024-06-14

## 2024-06-14 NOTE — TELEPHONE ENCOUNTER
Please Approve or Refuse.  Send to Pharmacy per Pt's Request:      Next Visit Date:  8/20/2024   Last Visit Date: 4/18/2024    Hemoglobin A1C (%)   Date Value   08/18/2023 5.8   11/08/2022 5.7   07/27/2022 5.8             ( goal A1C is < 7)   BP Readings from Last 3 Encounters:   05/30/24 120/84   04/18/24 130/80   03/28/24 115/78          (goal 120/80)  BUN   Date Value Ref Range Status   08/18/2023 11 6 - 20 mg/dL Final     Creatinine   Date Value Ref Range Status   08/18/2023 0.8 0.7 - 1.2 mg/dL Final     Potassium   Date Value Ref Range Status   08/18/2023 4.3 3.7 - 5.3 mmol/L Final

## 2024-07-03 ENCOUNTER — HOSPITAL ENCOUNTER (OUTPATIENT)
Dept: PAIN MANAGEMENT | Age: 49
Discharge: HOME | End: 2024-07-03
Payer: COMMERCIAL

## 2024-07-03 VITALS — BODY MASS INDEX: 26.98 KG/M2 | WEIGHT: 178 LBS | HEIGHT: 68 IN

## 2024-07-03 DIAGNOSIS — M51.26 LUMBAR DISC HERNIATION: Chronic | ICD-10-CM

## 2024-07-03 DIAGNOSIS — M47.817 LUMBOSACRAL SPONDYLOSIS WITHOUT MYELOPATHY: ICD-10-CM

## 2024-07-03 DIAGNOSIS — M54.16 LUMBAR RADICULOPATHY: Primary | ICD-10-CM

## 2024-07-03 PROCEDURE — 99213 OFFICE O/P EST LOW 20 MIN: CPT | Performed by: NURSE PRACTITIONER

## 2024-07-03 RX ORDER — PREGABALIN 25 MG/1
25 CAPSULE ORAL 2 TIMES DAILY
Qty: 60 CAPSULE | Refills: 5 | Status: SHIPPED | OUTPATIENT
Start: 2024-07-03 | End: 2024-12-30

## 2024-07-03 RX ORDER — LIDOCAINE 4 G/G
1 PATCH TOPICAL DAILY
Qty: 30 PATCH | Refills: 2 | Status: SHIPPED | OUTPATIENT
Start: 2024-07-03 | End: 2024-10-01

## 2024-07-03 ASSESSMENT — ENCOUNTER SYMPTOMS
BOWEL INCONTINENCE: 0
BACK PAIN: 1
CONSTIPATION: 0
SHORTNESS OF BREATH: 0
COUGH: 0

## 2024-07-03 NOTE — PROGRESS NOTES
Lumbosacral spondylosis without myelopathy    Lumbar disc herniation (Chronic)    Lumbar radiculopathy - Primary    Relevant Medications    pregabalin (LYRICA) 25 MG capsule          Treatment Plan:  Continue PT  Continue chiropractic care  EMG BLE pending  Continue Lyrica  Follow up appointment made for 6 months    I have reviewed the chief complaint and history of present illness (including ROS and PFSH) and vital documentation by my staff and I agree with their documentation and have added where applicable.

## 2024-07-13 DIAGNOSIS — K58.0 IRRITABLE BOWEL SYNDROME WITH DIARRHEA: ICD-10-CM

## 2024-07-15 RX ORDER — CITALOPRAM HYDROBROMIDE 10 MG/1
10 TABLET ORAL DAILY
Qty: 30 TABLET | Refills: 3 | Status: SHIPPED | OUTPATIENT
Start: 2024-07-15

## 2024-08-08 ENCOUNTER — HOSPITAL ENCOUNTER (OUTPATIENT)
Dept: NEUROLOGY | Age: 49
Discharge: HOME | End: 2024-08-08
Payer: COMMERCIAL

## 2024-08-08 ENCOUNTER — HOSPITAL ENCOUNTER (OUTPATIENT)
Age: 49
Discharge: HOME | End: 2024-08-08
Payer: COMMERCIAL

## 2024-08-08 DIAGNOSIS — D72.820 LYMPHOCYTOSIS: ICD-10-CM

## 2024-08-08 DIAGNOSIS — R73.03 PREDIABETES: ICD-10-CM

## 2024-08-08 DIAGNOSIS — E78.2 MIXED HYPERLIPIDEMIA: ICD-10-CM

## 2024-08-08 LAB
BASOPHILS # BLD: 0 K/UL (ref 0–0.2)
BASOPHILS NFR BLD: 0 % (ref 0–2)
CHOLEST SERPL-MCNC: 224 MG/DL
CHOLESTEROL/HDL RATIO: 3.6
EOSINOPHIL # BLD: 0.04 K/UL (ref 0–0.4)
EOSINOPHILS RELATIVE PERCENT: 1 % (ref 0–4)
ERYTHROCYTE [DISTWIDTH] IN BLOOD BY AUTOMATED COUNT: 14.8 % (ref 11.5–14.9)
EST. AVERAGE GLUCOSE BLD GHB EST-MCNC: 120 MG/DL
HBA1C MFR BLD: 5.8 % (ref 4–6)
HCT VFR BLD AUTO: 44.8 % (ref 41–53)
HDLC SERPL-MCNC: 63 MG/DL
HGB BLD-MCNC: 14.8 G/DL (ref 13.5–17.5)
LDLC SERPL CALC-MCNC: 150 MG/DL (ref 0–130)
LYMPHOCYTES NFR BLD: 2.26 K/UL (ref 1–4.8)
LYMPHOCYTES RELATIVE PERCENT: 58 % (ref 24–44)
MCH RBC QN AUTO: 30.4 PG (ref 26–34)
MCHC RBC AUTO-ENTMCNC: 33.1 G/DL (ref 31–37)
MCV RBC AUTO: 91.8 FL (ref 80–100)
MONOCYTES NFR BLD: 0.39 K/UL (ref 0.1–1.3)
MONOCYTES NFR BLD: 10 % (ref 1–7)
MORPHOLOGY: NORMAL
NEUTROPHILS NFR BLD: 31 % (ref 36–66)
NEUTS SEG NFR BLD: 1.21 K/UL (ref 1.3–9.1)
PLATELET # BLD AUTO: 233 K/UL (ref 150–450)
PMV BLD AUTO: 8.2 FL (ref 6–12)
RBC # BLD AUTO: 4.88 M/UL (ref 4.5–5.9)
TRIGL SERPL-MCNC: 57 MG/DL
WBC OTHER # BLD: 3.9 K/UL (ref 3.5–11)

## 2024-08-08 PROCEDURE — 85025 COMPLETE CBC W/AUTO DIFF WBC: CPT

## 2024-08-08 PROCEDURE — 83036 HEMOGLOBIN GLYCOSYLATED A1C: CPT

## 2024-08-08 PROCEDURE — 80061 LIPID PANEL: CPT

## 2024-08-08 PROCEDURE — 36415 COLL VENOUS BLD VENIPUNCTURE: CPT

## 2024-08-20 ENCOUNTER — OFFICE VISIT (OUTPATIENT)
Dept: FAMILY MEDICINE CLINIC | Age: 49
End: 2024-08-20
Payer: COMMERCIAL

## 2024-08-20 ENCOUNTER — HOSPITAL ENCOUNTER (OUTPATIENT)
Age: 49
Discharge: HOME | End: 2024-08-20
Payer: COMMERCIAL

## 2024-08-20 VITALS
SYSTOLIC BLOOD PRESSURE: 120 MMHG | OXYGEN SATURATION: 98 % | HEIGHT: 68 IN | WEIGHT: 170.8 LBS | HEART RATE: 68 BPM | DIASTOLIC BLOOD PRESSURE: 80 MMHG | BODY MASS INDEX: 25.88 KG/M2

## 2024-08-20 DIAGNOSIS — Z23 ENCOUNTER FOR IMMUNIZATION: ICD-10-CM

## 2024-08-20 DIAGNOSIS — I10 ESSENTIAL HYPERTENSION: ICD-10-CM

## 2024-08-20 DIAGNOSIS — Z53.20 REFUSAL OF STATIN MEDICATION BY PATIENT: ICD-10-CM

## 2024-08-20 DIAGNOSIS — E78.2 MIXED HYPERLIPIDEMIA: ICD-10-CM

## 2024-08-20 DIAGNOSIS — Z00.00 ENCOUNTER FOR WELL ADULT EXAM WITHOUT ABNORMAL FINDINGS: Primary | ICD-10-CM

## 2024-08-20 DIAGNOSIS — Z12.5 PROSTATE CANCER SCREENING: ICD-10-CM

## 2024-08-20 DIAGNOSIS — J30.2 SEASONAL ALLERGIES: ICD-10-CM

## 2024-08-20 DIAGNOSIS — R73.03 PREDIABETES: ICD-10-CM

## 2024-08-20 DIAGNOSIS — D72.820 LYMPHOCYTOSIS: ICD-10-CM

## 2024-08-20 DIAGNOSIS — M47.817 LUMBOSACRAL SPONDYLOSIS WITHOUT MYELOPATHY: ICD-10-CM

## 2024-08-20 DIAGNOSIS — Z12.11 COLON CANCER SCREENING: ICD-10-CM

## 2024-08-20 PROBLEM — R55 PRE-SYNCOPE: Status: RESOLVED | Noted: 2024-04-18 | Resolved: 2024-08-20

## 2024-08-20 LAB
ALBUMIN SERPL-MCNC: 4.5 G/DL (ref 3.5–5.2)
ALP SERPL-CCNC: 71 U/L (ref 40–129)
ALT SERPL-CCNC: 14 U/L (ref 5–41)
ANION GAP SERPL CALCULATED.3IONS-SCNC: 9 MMOL/L (ref 9–17)
AST SERPL-CCNC: 20 U/L
BILIRUB SERPL-MCNC: 0.6 MG/DL (ref 0.3–1.2)
BUN SERPL-MCNC: 11 MG/DL (ref 6–20)
CALCIUM SERPL-MCNC: 9.6 MG/DL (ref 8.6–10.4)
CHLORIDE SERPL-SCNC: 103 MMOL/L (ref 98–107)
CO2 SERPL-SCNC: 27 MMOL/L (ref 20–31)
CREAT SERPL-MCNC: 0.9 MG/DL (ref 0.7–1.2)
GFR, ESTIMATED: >90 ML/MIN/1.73M2
GLUCOSE SERPL-MCNC: 84 MG/DL (ref 70–99)
POTASSIUM SERPL-SCNC: 4 MMOL/L (ref 3.7–5.3)
PROT SERPL-MCNC: 6.6 G/DL (ref 6.4–8.3)
PSA SERPL-MCNC: 0.9 NG/ML (ref 0–4)
SODIUM SERPL-SCNC: 139 MMOL/L (ref 135–144)

## 2024-08-20 PROCEDURE — 99396 PREV VISIT EST AGE 40-64: CPT | Performed by: FAMILY MEDICINE

## 2024-08-20 PROCEDURE — 3079F DIAST BP 80-89 MM HG: CPT | Performed by: FAMILY MEDICINE

## 2024-08-20 PROCEDURE — 3074F SYST BP LT 130 MM HG: CPT | Performed by: FAMILY MEDICINE

## 2024-08-20 PROCEDURE — 36415 COLL VENOUS BLD VENIPUNCTURE: CPT

## 2024-08-20 PROCEDURE — G0103 PSA SCREENING: HCPCS

## 2024-08-20 PROCEDURE — 80053 COMPREHEN METABOLIC PANEL: CPT

## 2024-08-20 RX ORDER — EPINEPHRINE 0.3 MG/.3ML
1 INJECTION SUBCUTANEOUS PRN
Qty: 2 EACH | Refills: 0 | Status: SHIPPED | OUTPATIENT
Start: 2024-08-20

## 2024-08-20 SDOH — ECONOMIC STABILITY: FOOD INSECURITY: WITHIN THE PAST 12 MONTHS, THE FOOD YOU BOUGHT JUST DIDN'T LAST AND YOU DIDN'T HAVE MONEY TO GET MORE.: NEVER TRUE

## 2024-08-20 SDOH — ECONOMIC STABILITY: INCOME INSECURITY: HOW HARD IS IT FOR YOU TO PAY FOR THE VERY BASICS LIKE FOOD, HOUSING, MEDICAL CARE, AND HEATING?: SOMEWHAT HARD

## 2024-08-20 SDOH — ECONOMIC STABILITY: FOOD INSECURITY: WITHIN THE PAST 12 MONTHS, YOU WORRIED THAT YOUR FOOD WOULD RUN OUT BEFORE YOU GOT MONEY TO BUY MORE.: NEVER TRUE

## 2024-08-20 ASSESSMENT — ENCOUNTER SYMPTOMS
CONSTIPATION: 0
SORE THROAT: 0
TROUBLE SWALLOWING: 0
BLOOD IN STOOL: 0
COLOR CHANGE: 0
SINUS PRESSURE: 0
SHORTNESS OF BREATH: 0
STRIDOR: 0
DIARRHEA: 0
RECTAL PAIN: 0
NAUSEA: 0
CHEST TIGHTNESS: 0
ABDOMINAL DISTENTION: 0
RHINORRHEA: 0
VOMITING: 0
ABDOMINAL PAIN: 0
EYE REDNESS: 0
COUGH: 0
WHEEZING: 0
BACK PAIN: 1

## 2024-08-20 NOTE — PATIENT INSTRUCTIONS
[facility] Food Resources*  (Call [facility] for more resources)        Lamont Laureano [facility] Food Ministry  What they offer: Food pantry second and fourth Tuesday 4:30-6pm  Phone Number and Address: [phone] Toll Free: [employer] at [employer], between [address] and [employer]; [address] [facility] [address]  [facility] Office on Aging of [facility]  What they offer: “Meals & Nutrition” search option on website  Phone Number and Website: 647.715.6254; https://[employer].Ticketfly/  [facility] Minist[facility]  What they offer: Dinner is open to all (must be registered and in good standing) and three hot meals a day for shelter residents. Breakfast 7-8am, Lunch 12-1pm, and Dinner 5-6pm daily.  Phone and Address: [phone]; [address] [facility] [address]  [employer] Delivery program  What they offer: Food Box Delivery for those within [facility] who are homebound or without transportation. Applications done by phone. Qualifying individuals are eligible for 3 deliveries for the lifetime of the program.  Phone number: Call for eligibility check at 2-1-1 or 5-699-458[employer] (4986)  [facility]  What they offer: Food Pantry second and fourth Saturday 1-5pm  Phone and Address: [phone]; [address] [facility] [address]  [employer] Food Pantries   What they offer: Mobile pantries throughout the [facility]. Anyone in need may visit one pantry per month.  Phone Number and Website: For locations and schedule call 2-1-1 or 1-207-121-HELP (2069) or check the website www.feedtoledo.org  [employer] [place] [employer] Charities  What they offer: Food Pantry, call for schedule, open to All  Phone Number: [phone]  [facility]  What they offer: Hot meals, Breakfast: Monday, Wednesday, Friday 8-10am, Lunch: Monday-Friday 10am-12:30pm. Food pantry (serves [address] or east of [facility])

## 2024-08-20 NOTE — PROGRESS NOTES
Well Adult Note  Name: John Armenta Today’s Date: 2024   MRN: [id number] Sex: Male   Age: 49 y.o. Ethnicity: Non- / Non    : 1975 Race: Black / African American      John Armenta is here for a well adult exam.       Subjective   History:  Patient is scheduled for physical and to discuss blood work.    Patient has blood work done that showed prediabetes which is stable patient is not currently on any medications.  Patient has been working with diet and physical activity has lost about 6 pounds by changing diet and increasing physical activity.       Hyperlipidemia worsening, patient's cholesterol has increased from previous.  He was taken statins in the past but could not tolerate.  Patient is not willing to try any other statin.  We discussed about the side effects versus benefits.  Patient wants to work with diet and physical activity.      Lymphocytosis previously evaluated with flow cytometry and peripheral blood flow which was normal.  Patient has increased in the lymphocytes, discussed with patient that you need to see a hematologist.  Patient denies any history of cancers in family.        Review of Systems   Constitutional:  Negative for activity change, appetite change, fatigue, fever and unexpected weight change.   HENT:  Negative for congestion, ear pain, postnasal drip, rhinorrhea, sinus pressure, sore throat and trouble swallowing.    Eyes:  Negative for redness and visual disturbance.   Respiratory:  Negative for cough, chest tightness, shortness of breath, wheezing and stridor.    Cardiovascular:  Negative for chest pain, palpitations and leg swelling.   Gastrointestinal:  Negative for abdominal distention, abdominal pain, blood in stool, constipation, diarrhea, nausea, rectal pain and vomiting.   Endocrine: Negative for polydipsia, polyphagia and polyuria.   Genitourinary:  Negative for difficulty urinating, flank pain, frequency and urgency.   Musculoskeletal:

## 2024-08-20 NOTE — PROGRESS NOTES
Visit Information    Have you changed or started any medications since your last visit including any over-the-counter medicines, vitamins, or herbal medicines? no   Have you stopped taking any of your medications? Is so, why? -  no  Are you having any side effects from any of your medications? - no    Have you seen any other physician or provider since your last visit?  yes    Have you had any other diagnostic tests since your last visit?  yes   Have you been seen in the emergency room and/or had an admission in a hospital since we last saw you?  no   Have you had your routine dental cleaning in the past 6 months?  no     Do you have an active MyChart account? If no, what is the barrier?  Yes    Patient Care Team:  Wan Ordonez MD as PCP - General (Family Medicine)  Wan Ordonez MD as PCP - Empaneled Provider    Medical History Review  Past Medical, Family, and Social History reviewed and does contribute to the patient presenting condition    Health Maintenance   Topic Date Due    Hepatitis B vaccine (1 of 3 - 19+ 3-dose series) Never done    Colorectal Cancer Screen  09/01/2019    Flu vaccine (1) 08/01/2024    Depression Monitoring  04/18/2025    A1C test (Diabetic or Prediabetic)  08/08/2025    DTaP/Tdap/Td vaccine (2 - Td or Tdap) 05/31/2028    Lipids  08/08/2029    COVID-19 Vaccine  Completed    Hepatitis C screen  Completed    HIV screen  Completed    Hepatitis A vaccine  Aged Out    Hib vaccine  Aged Out    Polio vaccine  Aged Out    Meningococcal (ACWY) vaccine  Aged Out    Pneumococcal 0-64 years Vaccine  Aged Out

## 2024-09-13 ENCOUNTER — TELEPHONE (OUTPATIENT)
Dept: ONCOLOGY | Age: 49
End: 2024-09-13

## 2024-09-13 ENCOUNTER — INITIAL CONSULT (OUTPATIENT)
Dept: ONCOLOGY | Age: 49
End: 2024-09-13
Payer: COMMERCIAL

## 2024-09-13 VITALS
WEIGHT: 168 LBS | DIASTOLIC BLOOD PRESSURE: 86 MMHG | BODY MASS INDEX: 25.54 KG/M2 | HEART RATE: 69 BPM | TEMPERATURE: 97.1 F | SYSTOLIC BLOOD PRESSURE: 131 MMHG

## 2024-09-13 DIAGNOSIS — B99.9 RECURRENT INFECTIONS: ICD-10-CM

## 2024-09-13 DIAGNOSIS — D72.820 LYMPHOCYTOSIS: ICD-10-CM

## 2024-09-13 DIAGNOSIS — D70.4 CYCLICAL NEUTROPENIA (HCC): Primary | ICD-10-CM

## 2024-09-13 PROBLEM — D70.9 NEUTROPENIA (HCC): Status: ACTIVE | Noted: 2024-09-13

## 2024-09-13 PROCEDURE — G8417 CALC BMI ABV UP PARAM F/U: HCPCS | Performed by: INTERNAL MEDICINE

## 2024-09-13 PROCEDURE — 3075F SYST BP GE 130 - 139MM HG: CPT | Performed by: INTERNAL MEDICINE

## 2024-09-13 PROCEDURE — G8427 DOCREV CUR MEDS BY ELIG CLIN: HCPCS | Performed by: INTERNAL MEDICINE

## 2024-09-13 PROCEDURE — 3079F DIAST BP 80-89 MM HG: CPT | Performed by: INTERNAL MEDICINE

## 2024-09-13 PROCEDURE — 99245 OFF/OP CONSLTJ NEW/EST HI 55: CPT | Performed by: INTERNAL MEDICINE

## 2024-09-16 DIAGNOSIS — J30.2 SEASONAL ALLERGIES: ICD-10-CM

## 2024-09-16 RX ORDER — CETIRIZINE HYDROCHLORIDE 10 MG/1
TABLET ORAL
Qty: 90 TABLET | Refills: 0 | Status: SHIPPED | OUTPATIENT
Start: 2024-09-16

## 2024-09-23 ENCOUNTER — TELEPHONE (OUTPATIENT)
Dept: FAMILY MEDICINE CLINIC | Age: 49
End: 2024-09-23

## 2024-09-26 ENCOUNTER — TELEPHONE (OUTPATIENT)
Dept: GASTROENTEROLOGY | Age: 49
End: 2024-09-26

## 2024-09-26 ENCOUNTER — TELEMEDICINE (OUTPATIENT)
Dept: FAMILY MEDICINE CLINIC | Age: 49
End: 2024-09-26
Payer: COMMERCIAL

## 2024-09-26 DIAGNOSIS — R73.03 PREDIABETES: ICD-10-CM

## 2024-09-26 DIAGNOSIS — D70.4 CYCLICAL NEUTROPENIA (HCC): ICD-10-CM

## 2024-09-26 DIAGNOSIS — Z86.010 H/O COLONOSCOPY WITH POLYPECTOMY: ICD-10-CM

## 2024-09-26 DIAGNOSIS — D72.821 MONOCYTOSIS: ICD-10-CM

## 2024-09-26 DIAGNOSIS — E78.2 MIXED HYPERLIPIDEMIA: ICD-10-CM

## 2024-09-26 DIAGNOSIS — D72.820 LYMPHOCYTOSIS: Primary | ICD-10-CM

## 2024-09-26 DIAGNOSIS — Z98.890 H/O COLONOSCOPY WITH POLYPECTOMY: ICD-10-CM

## 2024-09-26 PROBLEM — M51.36 LUMBAR DEGENERATIVE DISC DISEASE: Status: RESOLVED | Noted: 2021-12-13 | Resolved: 2024-09-26

## 2024-09-26 PROBLEM — M51.369 LUMBAR DEGENERATIVE DISC DISEASE: Status: RESOLVED | Noted: 2021-12-13 | Resolved: 2024-09-26

## 2024-09-26 PROCEDURE — G8427 DOCREV CUR MEDS BY ELIG CLIN: HCPCS | Performed by: FAMILY MEDICINE

## 2024-09-26 PROCEDURE — 99214 OFFICE O/P EST MOD 30 MIN: CPT | Performed by: FAMILY MEDICINE

## 2024-09-26 RX ORDER — CHLORAL HYDRATE 500 MG
2000 CAPSULE ORAL DAILY
Qty: 60 CAPSULE | Refills: 3 | Status: SHIPPED | OUTPATIENT
Start: 2024-09-26

## 2024-09-26 ASSESSMENT — ENCOUNTER SYMPTOMS
SORE THROAT: 0
RHINORRHEA: 0
ABDOMINAL DISTENTION: 0
STRIDOR: 0
EYE REDNESS: 0
COUGH: 0
ABDOMINAL PAIN: 0
CHEST TIGHTNESS: 0
BLOOD IN STOOL: 0
COLOR CHANGE: 0
VOMITING: 0
RECTAL PAIN: 0
WHEEZING: 0
TROUBLE SWALLOWING: 0
SHORTNESS OF BREATH: 0
CONSTIPATION: 0
DIARRHEA: 0
NAUSEA: 0
BACK PAIN: 1
SINUS PRESSURE: 0

## 2024-10-14 NOTE — TELEPHONE ENCOUNTER
I called patient per Edil Nino CNP's request; needs appointment before refill. LM on patient's VM to call the office. alert/confused

## 2024-11-05 ENCOUNTER — HOSPITAL ENCOUNTER (OUTPATIENT)
Age: 49
Discharge: HOME | End: 2024-11-05
Payer: COMMERCIAL

## 2024-11-05 DIAGNOSIS — D72.820 LYMPHOCYTOSIS: ICD-10-CM

## 2024-11-05 DIAGNOSIS — B99.9 RECURRENT INFECTIONS: ICD-10-CM

## 2024-11-05 LAB
ATYPICAL LYMPHOCYTE ABSOLUTE COUNT: 0.59 K/UL
ATYPICAL LYMPHOCYTES: 19 %
BASOPHILS # BLD: 0 K/UL (ref 0–0.2)
BASOPHILS NFR BLD: 0 % (ref 0–2)
EOSINOPHIL # BLD: 0.03 K/UL (ref 0–0.4)
EOSINOPHILS RELATIVE PERCENT: 1 % (ref 0–4)
ERYTHROCYTE [DISTWIDTH] IN BLOOD BY AUTOMATED COUNT: 14 % (ref 11.5–14.9)
HCT VFR BLD AUTO: 43.2 % (ref 41–53)
HGB BLD-MCNC: 14.6 G/DL (ref 13.5–17.5)
IGA SERPL-MCNC: 160 MG/DL (ref 70–400)
IGG SERPL-MCNC: 1000 MG/DL (ref 700–1600)
IGM SERPL-MCNC: 57 MG/DL (ref 40–230)
LYMPHOCYTES NFR BLD: 1.54 K/UL (ref 1–4.8)
LYMPHOCYTES RELATIVE PERCENT: 50 % (ref 24–44)
MCH RBC QN AUTO: 30.7 PG (ref 26–34)
MCHC RBC AUTO-ENTMCNC: 33.8 G/DL (ref 31–37)
MCV RBC AUTO: 90.8 FL (ref 80–100)
MONOCYTES NFR BLD: 0.16 K/UL (ref 0.1–1.3)
MONOCYTES NFR BLD: 5 % (ref 1–7)
MORPHOLOGY: NORMAL
NEUTROPHILS NFR BLD: 25 % (ref 36–66)
NEUTS SEG NFR BLD: 0.78 K/UL (ref 1.3–9.1)
PLATELET # BLD AUTO: 225 K/UL (ref 150–450)
PMV BLD AUTO: 8.3 FL (ref 6–12)
RBC # BLD AUTO: 4.76 M/UL (ref 4.5–5.9)
RETICS # AUTO: 0.03 M/UL (ref 0.02–0.1)
RETICS/RBC NFR AUTO: 0.6 % (ref 0.5–2)
WBC OTHER # BLD: 3.1 K/UL (ref 3.5–11)

## 2024-11-05 PROCEDURE — 82784 ASSAY IGA/IGD/IGG/IGM EACH: CPT

## 2024-11-05 PROCEDURE — 85045 AUTOMATED RETICULOCYTE COUNT: CPT

## 2024-11-05 PROCEDURE — 85025 COMPLETE CBC W/AUTO DIFF WBC: CPT

## 2024-11-05 PROCEDURE — 36415 COLL VENOUS BLD VENIPUNCTURE: CPT

## 2024-11-06 LAB — SURGICAL PATHOLOGY REPORT: NORMAL

## 2024-11-07 LAB — PATH REV BLD -IMP: NORMAL

## 2024-11-18 DIAGNOSIS — E78.2 MIXED HYPERLIPIDEMIA: ICD-10-CM

## 2024-11-18 DIAGNOSIS — K58.0 IRRITABLE BOWEL SYNDROME WITH DIARRHEA: ICD-10-CM

## 2024-11-18 DIAGNOSIS — J30.2 SEASONAL ALLERGIES: ICD-10-CM

## 2024-11-18 RX ORDER — CITALOPRAM HYDROBROMIDE 10 MG/1
10 TABLET ORAL DAILY
Qty: 30 TABLET | Refills: 3 | Status: SHIPPED | OUTPATIENT
Start: 2024-11-18

## 2024-11-18 RX ORDER — CETIRIZINE HYDROCHLORIDE 10 MG/1
TABLET ORAL
Qty: 90 TABLET | Refills: 0 | Status: SHIPPED | OUTPATIENT
Start: 2024-11-18

## 2024-11-18 RX ORDER — CHLORAL HYDRATE 500 MG
2000 CAPSULE ORAL DAILY
Qty: 60 CAPSULE | Refills: 3 | Status: SHIPPED | OUTPATIENT
Start: 2024-11-18

## 2024-11-18 NOTE — TELEPHONE ENCOUNTER
Please Approve or Refuse.  Send to Pharmacy per Pt's Request:      Next Visit Date:  12/17/2024   Last Visit Date: 9/26/2024    Hemoglobin A1C (%)   Date Value   08/08/2024 5.8   08/18/2023 5.8   11/08/2022 5.7             ( goal A1C is < 7)   BP Readings from Last 3 Encounters:   09/13/24 131/86   08/20/24 120/80   05/30/24 120/84          (goal 120/80)  BUN   Date Value Ref Range Status   08/20/2024 11 6 - 20 mg/dL Final     Creatinine   Date Value Ref Range Status   08/20/2024 0.9 0.7 - 1.2 mg/dL Final     Potassium   Date Value Ref Range Status   08/20/2024 4.0 3.7 - 5.3 mmol/L Final     Comment:     SPECIMEN MODERATELY HEMOLYZED, RESULTS MAY BE ADVERSELY AFFECTED

## 2024-11-18 NOTE — TELEPHONE ENCOUNTER
Patient called in and wanted to know if you could send him a new referral to the physical therapist and podiatrist.

## 2024-11-25 ENCOUNTER — APPOINTMENT (OUTPATIENT)
Dept: GENERAL RADIOLOGY | Age: 49
End: 2024-11-25
Payer: COMMERCIAL

## 2024-11-25 ENCOUNTER — HOSPITAL ENCOUNTER (EMERGENCY)
Age: 49
Discharge: HOME | End: 2024-11-25
Attending: STUDENT IN AN ORGANIZED HEALTH CARE EDUCATION/TRAINING PROGRAM
Payer: COMMERCIAL

## 2024-11-25 ENCOUNTER — HOSPITAL ENCOUNTER (OUTPATIENT)
Age: 49
Setting detail: SPECIMEN
Discharge: HOME | End: 2024-11-25

## 2024-11-25 VITALS
OXYGEN SATURATION: 98 % | SYSTOLIC BLOOD PRESSURE: 121 MMHG | DIASTOLIC BLOOD PRESSURE: 83 MMHG | BODY MASS INDEX: 25.46 KG/M2 | RESPIRATION RATE: 16 BRPM | HEIGHT: 68 IN | TEMPERATURE: 98.1 F | HEART RATE: 83 BPM | WEIGHT: 168 LBS

## 2024-11-25 DIAGNOSIS — R11.0 NAUSEA: Primary | ICD-10-CM

## 2024-11-25 LAB
ALBUMIN SERPL-MCNC: 4.2 G/DL (ref 3.5–5.2)
ALP SERPL-CCNC: 65 U/L (ref 40–129)
ALT SERPL-CCNC: 17 U/L (ref 10–50)
ANION GAP SERPL CALCULATED.3IONS-SCNC: 11 MMOL/L (ref 9–16)
AST SERPL-CCNC: 21 U/L (ref 10–50)
BASOPHILS # BLD: 0 K/UL (ref 0–0.2)
BASOPHILS NFR BLD: 1 % (ref 0–2)
BILIRUB SERPL-MCNC: 0.4 MG/DL (ref 0–1.2)
BUN SERPL-MCNC: 13 MG/DL (ref 6–20)
CALCIUM SERPL-MCNC: 9.6 MG/DL (ref 8.6–10.4)
CHLORIDE SERPL-SCNC: 102 MMOL/L (ref 98–107)
CO2 SERPL-SCNC: 27 MMOL/L (ref 20–31)
CREAT SERPL-MCNC: 1 MG/DL (ref 0.7–1.2)
EOSINOPHIL # BLD: 0 K/UL (ref 0–0.4)
EOSINOPHILS RELATIVE PERCENT: 1 % (ref 0–4)
ERYTHROCYTE [DISTWIDTH] IN BLOOD BY AUTOMATED COUNT: 14.6 % (ref 11.5–14.9)
FLUAV RNA RESP QL NAA+PROBE: NOT DETECTED
FLUBV RNA RESP QL NAA+PROBE: NOT DETECTED
GFR, ESTIMATED: >90 ML/MIN/1.73M2
GLUCOSE SERPL-MCNC: 78 MG/DL (ref 74–99)
HCT VFR BLD AUTO: 42.4 % (ref 41–53)
HGB BLD-MCNC: 14.1 G/DL (ref 13.5–17.5)
LIPASE SERPL-CCNC: 53 U/L (ref 13–60)
LYMPHOCYTES NFR BLD: 1.9 K/UL (ref 1–4.8)
LYMPHOCYTES RELATIVE PERCENT: 44 % (ref 24–44)
MCH RBC QN AUTO: 30.5 PG (ref 26–34)
MCHC RBC AUTO-ENTMCNC: 33.3 G/DL (ref 31–37)
MCV RBC AUTO: 91.4 FL (ref 80–100)
MONOCYTES NFR BLD: 0.4 K/UL (ref 0.1–1.3)
MONOCYTES NFR BLD: 8 % (ref 1–7)
NEUTROPHILS NFR BLD: 46 % (ref 36–66)
NEUTS SEG NFR BLD: 2.1 K/UL (ref 1.3–9.1)
PLATELET # BLD AUTO: 221 K/UL (ref 150–450)
PMV BLD AUTO: 8 FL (ref 6–12)
POTASSIUM SERPL-SCNC: 3.9 MMOL/L (ref 3.7–5.3)
PROT SERPL-MCNC: 6.6 G/DL (ref 6.6–8.7)
RBC # BLD AUTO: 4.64 M/UL (ref 4.5–5.9)
SARS-COV-2 RNA RESP QL NAA+PROBE: NOT DETECTED
SODIUM SERPL-SCNC: 140 MMOL/L (ref 136–145)
SOURCE: NORMAL
SPECIMEN DESCRIPTION: NORMAL
TROPONIN I SERPL HS-MCNC: 7 NG/L (ref 0–22)
TROPONIN I SERPL HS-MCNC: 7 NG/L (ref 0–22)
WBC OTHER # BLD: 4.4 K/UL (ref 3.5–11)

## 2024-11-25 PROCEDURE — 71045 X-RAY EXAM CHEST 1 VIEW: CPT

## 2024-11-25 PROCEDURE — 93005 ELECTROCARDIOGRAM TRACING: CPT | Performed by: PHYSICIAN ASSISTANT

## 2024-11-25 PROCEDURE — 6360000002 HC RX W HCPCS: Performed by: PHYSICIAN ASSISTANT

## 2024-11-25 PROCEDURE — 96374 THER/PROPH/DIAG INJ IV PUSH: CPT

## 2024-11-25 PROCEDURE — 87636 SARSCOV2 & INF A&B AMP PRB: CPT

## 2024-11-25 PROCEDURE — 36415 COLL VENOUS BLD VENIPUNCTURE: CPT

## 2024-11-25 PROCEDURE — 99285 EMERGENCY DEPT VISIT HI MDM: CPT

## 2024-11-25 PROCEDURE — 84484 ASSAY OF TROPONIN QUANT: CPT

## 2024-11-25 PROCEDURE — 83690 ASSAY OF LIPASE: CPT

## 2024-11-25 PROCEDURE — 85025 COMPLETE CBC W/AUTO DIFF WBC: CPT

## 2024-11-25 PROCEDURE — 88184 FLOWCYTOMETRY/ TC 1 MARKER: CPT

## 2024-11-25 PROCEDURE — 80053 COMPREHEN METABOLIC PANEL: CPT

## 2024-11-25 PROCEDURE — 88185 FLOWCYTOMETRY/TC ADD-ON: CPT

## 2024-11-25 PROCEDURE — 2580000003 HC RX 258: Performed by: PHYSICIAN ASSISTANT

## 2024-11-25 RX ORDER — 0.9 % SODIUM CHLORIDE 0.9 %
500 INTRAVENOUS SOLUTION INTRAVENOUS ONCE
Status: COMPLETED | OUTPATIENT
Start: 2024-11-25 | End: 2024-11-25

## 2024-11-25 RX ORDER — ONDANSETRON 2 MG/ML
4 INJECTION INTRAMUSCULAR; INTRAVENOUS ONCE
Status: COMPLETED | OUTPATIENT
Start: 2024-11-25 | End: 2024-11-25

## 2024-11-25 RX ADMIN — SODIUM CHLORIDE 500 ML: 9 INJECTION, SOLUTION INTRAVENOUS at 17:49

## 2024-11-25 RX ADMIN — ONDANSETRON 4 MG: 2 INJECTION, SOLUTION INTRAMUSCULAR; INTRAVENOUS at 17:50

## 2024-11-25 ASSESSMENT — PAIN SCALES - GENERAL: PAINLEVEL_OUTOF10: 4

## 2024-11-25 ASSESSMENT — VISUAL ACUITY: OU: 1

## 2024-11-25 ASSESSMENT — PAIN - FUNCTIONAL ASSESSMENT: PAIN_FUNCTIONAL_ASSESSMENT: 0-10

## 2024-11-25 ASSESSMENT — PAIN DESCRIPTION - LOCATION: LOCATION: BACK

## 2024-11-25 NOTE — ED TRIAGE NOTES
Mode of arrival (squad #, walk in, police, etc) : car        Chief complaint(s): nauseated        Arrival Note (brief scenario, treatment PTA, etc).: had a migraine yesterday, took some meds and woke up queasy and nauseated.  Had 1 episode of vomiting earlier        C= \"Have you ever felt that you should Cut down on your drinking?\"  No  A= \"Have people Annoyed you by criticizing your drinking?\"  No  G= \"Have you ever felt bad or Guilty about your drinking?\"  No  E= \"Have you ever had a drink as an Eye-opener first thing in the morning to steady your nerves or to help a hangover?\"  No      Deferred []      Reason for deferring: N/A    *If yes to two or more: probable alcohol abuse.*

## 2024-11-25 NOTE — ED PROVIDER NOTES
eMERGENCY dEPARTMENT eNCOUnter   Independent Attestation     Pt Name: John Armenta  MRN: [id number]  Birthdate 1975  Date of evaluation: 11/25/24     John Armenta is a 49 y.o. male with CC: Nausea      Based on the medical record the care appears appropriate.  I was personally available for consultation in the Emergency Department.    [id number]  Sinus rhythm rate of 70.  No ST segment changes, no ectopy, sinus arrhythmia present.  Normal axis, good R wave progression      Baljeet Paiz DO  Attending Emergency Physician          Baljeet Paiz DO  11/25/24 2034    
OMEGA-3 FATTY ACIDS (FISH OIL) 1000 MG CAPSULE    Take 2 capsules by mouth daily    PREGABALIN (LYRICA) 25 MG CAPSULE    Take 1 capsule by mouth 2 times daily for 180 days. Max Daily Amount: 50 mg     ALLERGIES     is allergic to seasonal.  FAMILY HISTORY     He indicated that his mother is alive. He indicated that his father is alive.     SOCIAL HISTORY       Social History     Tobacco Use    Smoking status: Former     Types: Cigars     Quit date: 2018     Years since quittin.0     Passive exposure: Current    Smokeless tobacco: Never    Tobacco comments:     quit 2016   Vaping Use    Vaping status: Never Used   Substance Use Topics    Alcohol use: Not Currently     Comment: beer once week    Drug use: Not Currently     Types: Marijuana (Weed)     PHYSICAL EXAM     INITIAL VITALS: /83   Pulse 83   Temp 98.1 °F (36.7 °C) (Oral)   Resp 16   Ht 1.727 m (5' 8\")   Wt 76.2 kg (168 lb)   SpO2 98%   BMI 25.54 kg/m²    Physical Exam  Vitals and nursing note reviewed.   Constitutional:       General: He is awake.      Appearance: Normal appearance. He is well-developed, well-groomed and normal weight.   HENT:      Head: Normocephalic and atraumatic.      Nose: Nose normal.      Mouth/Throat:      Mouth: Mucous membranes are moist.      Pharynx: Oropharynx is clear. No posterior oropharyngeal erythema.   Eyes:      General: Lids are normal. Vision grossly intact. Gaze aligned appropriately.      Pupils: Pupils are equal, round, and reactive to light.   Cardiovascular:      Rate and Rhythm: Normal rate and regular rhythm.      Pulses: Normal pulses.      Heart sounds: Normal heart sounds, S1 normal and S2 normal.   Pulmonary:      Effort: Pulmonary effort is normal. No respiratory distress.      Breath sounds: Normal breath sounds and air entry. No stridor. No decreased breath sounds, wheezing, rhonchi or rales.   Chest:      Chest wall: No tenderness.   Abdominal:      General: Abdomen is flat.

## 2024-11-26 ENCOUNTER — TELEPHONE (OUTPATIENT)
Dept: FAMILY MEDICINE CLINIC | Age: 49
End: 2024-11-26

## 2024-11-26 LAB
EKG ATRIAL RATE: 70 BPM
EKG P AXIS: 60 DEGREES
EKG P-R INTERVAL: 106 MS
EKG Q-T INTERVAL: 382 MS
EKG QRS DURATION: 88 MS
EKG QTC CALCULATION (BAZETT): 412 MS
EKG R AXIS: 41 DEGREES
EKG T AXIS: 24 DEGREES
EKG VENTRICULAR RATE: 70 BPM

## 2024-11-26 PROCEDURE — 93010 ELECTROCARDIOGRAM REPORT: CPT | Performed by: INTERNAL MEDICINE

## 2024-11-26 NOTE — DISCHARGE INSTRUCTIONS
Recommend close follow-up with your family doctor. Continue to take your home meds as prescribed.  Return to the ED if you develop worsening headache, dizziness, passing out, vision changes, neck or back pain, fevers, chest pain, shortness of breath, abdominal pain, vomiting, numbness, weakness or any other concerning symptoms.  Stay hydrated.

## 2024-11-26 NOTE — TELEPHONE ENCOUNTER
[facility] ED Follow up Call    Reason for ED visit:   Nausea     11/26/2024       FU appts/Provider:    Future Appointments   Date Time Provider Department Center   12/10/2024  8:45 AM Vasyl Fajardo MD [facility]   12/17/2024  9:00 AM Wan Ordonez MD [facility] DEP   1/8/2025  9:00 AM Dianne Atkins, APRN - CNP [facility] [place]         VOICEMAIL DOCUMENTATION - ERASE IF NOT USED  Hi, this message is for Lopez.  This is Chelita Grey MA from Wan Joyce office.  Just calling to see how you are doing after your recent visit to the Emergency Room.  Wan Joyce wants to make sure you were able to fill any prescriptions and that you understand your discharge instructions.  Please return our call if you need to make a follow up appointment with your provider or have any further needs.   Our phone number is [phone].  Have a great day.

## 2024-11-27 LAB — SURGICAL PATHOLOGY REPORT: NORMAL

## 2024-12-01 LAB — FLOW CYTOMETRY BL: NORMAL

## 2024-12-05 ENCOUNTER — TELEMEDICINE (OUTPATIENT)
Dept: FAMILY MEDICINE CLINIC | Age: 49
End: 2024-12-05
Payer: COMMERCIAL

## 2024-12-05 DIAGNOSIS — R51.9 ACUTE INTRACTABLE HEADACHE, UNSPECIFIED HEADACHE TYPE: ICD-10-CM

## 2024-12-05 DIAGNOSIS — I10 ESSENTIAL HYPERTENSION: Primary | ICD-10-CM

## 2024-12-05 DIAGNOSIS — B35.1 ONYCHOMYCOSIS: ICD-10-CM

## 2024-12-05 DIAGNOSIS — E78.2 MIXED HYPERLIPIDEMIA: ICD-10-CM

## 2024-12-05 DIAGNOSIS — D72.820 LYMPHOCYTOSIS: ICD-10-CM

## 2024-12-05 DIAGNOSIS — L60.8 TOENAIL DEFORMITY: ICD-10-CM

## 2024-12-05 DIAGNOSIS — M47.817 LUMBOSACRAL SPONDYLOSIS WITHOUT MYELOPATHY: ICD-10-CM

## 2024-12-05 PROCEDURE — G8427 DOCREV CUR MEDS BY ELIG CLIN: HCPCS | Performed by: FAMILY MEDICINE

## 2024-12-05 PROCEDURE — 99214 OFFICE O/P EST MOD 30 MIN: CPT | Performed by: FAMILY MEDICINE

## 2024-12-05 ASSESSMENT — ENCOUNTER SYMPTOMS
RECTAL PAIN: 0
COLOR CHANGE: 0
VOMITING: 0
EYE REDNESS: 0
WHEEZING: 0
NAUSEA: 0
DIARRHEA: 0
BACK PAIN: 1
TROUBLE SWALLOWING: 0
CHEST TIGHTNESS: 0
SORE THROAT: 0
SHORTNESS OF BREATH: 0
CONSTIPATION: 0
COUGH: 0
SINUS PRESSURE: 0
STRIDOR: 0
ABDOMINAL DISTENTION: 0
BLOOD IN STOOL: 0
RHINORRHEA: 0
ABDOMINAL PAIN: 0

## 2024-12-05 NOTE — PROGRESS NOTES
John Armenta, was evaluated through a synchronous (real-time) audio-video encounter. The patient (or guardian if applicable) is aware that this is a billable service, which includes applicable co-pays. This Virtual Visit was conducted with patient's (and/or legal guardian's) consent. Patient identification was verified, and a caregiver was present when appropriate.   The patient was located at Home: [address]   [address]  [place] [address]  Provider was located at Facility (Appt Dept): [facility] [address] [address]  Confirm you are appropriately licensed, registered, or certified to deliver care in the state where the patient is located as indicated above. If you are not or unsure, please re-schedule the visit: Yes, I confirm.     John Armenta (:  1975) is a Established patient, presenting virtually for evaluation of the following:      Below is the assessment and plan developed based on review of pertinent history, physical exam, labs, studies, and medications.     Assessment & Plan  Essential hypertension  Controlled, patient restarted antihypertensives, blood pressure is running normal.         Acute intractable headache, unspecified headache type  Headache is resolved likely due to dehydration and high blood pressure         Lymphocytosis  Improving, patient evaluated by hematologist flow cytometry was done which was normal         Mixed hyperlipidemia  Uncontrolled, not on any statins patient has refused medical management, repeat lipid panel continue lifestyle changes    Orders:    Lipid Panel; Future    Lumbosacral spondylosis without myelopathy  Chronic problem, not improving patient follows with pain management due to multiple physical therapies, referral placed to orthopedic as per patient request    Orders:    Chun Knutson MD, Orthopaedic Surgery, [place]    Onychomycosis    Chronic problem referral placed    Orders:    Nemesio Blank DPM,

## 2024-12-05 NOTE — ASSESSMENT & PLAN NOTE
Chronic problem, not improving patient follows with pain management due to multiple physical therapies, referral placed to orthopedic as per patient request    Orders:    Chun Knutson MD, Orthopaedic Surgery, [place]

## 2024-12-05 NOTE — ASSESSMENT & PLAN NOTE
Uncontrolled, not on any statins patient has refused medical management, repeat lipid panel continue lifestyle changes    Orders:    Lipid Panel; Future

## 2024-12-06 NOTE — ASSESSMENT & PLAN NOTE
Chronic, not at goal (unstable), recurrent problem referral placed to podiatrist    Orders:    Chun Knutson MD, Orthopaedic Surgery, [place]

## 2024-12-10 ENCOUNTER — HOSPITAL ENCOUNTER (OUTPATIENT)
Age: 49
Discharge: HOME | End: 2024-12-10
Payer: COMMERCIAL

## 2024-12-10 ENCOUNTER — TELEPHONE (OUTPATIENT)
Dept: ONCOLOGY | Age: 49
End: 2024-12-10

## 2024-12-10 ENCOUNTER — OFFICE VISIT (OUTPATIENT)
Dept: ONCOLOGY | Age: 49
End: 2024-12-10
Payer: COMMERCIAL

## 2024-12-10 VITALS
BODY MASS INDEX: 25.5 KG/M2 | HEART RATE: 78 BPM | TEMPERATURE: 96.8 F | SYSTOLIC BLOOD PRESSURE: 126 MMHG | WEIGHT: 167.7 LBS | DIASTOLIC BLOOD PRESSURE: 79 MMHG

## 2024-12-10 DIAGNOSIS — D70.4 CYCLICAL NEUTROPENIA (HCC): Primary | ICD-10-CM

## 2024-12-10 DIAGNOSIS — D70.4 CYCLICAL NEUTROPENIA (HCC): ICD-10-CM

## 2024-12-10 DIAGNOSIS — D72.820 LYMPHOCYTOSIS: ICD-10-CM

## 2024-12-10 DIAGNOSIS — E78.2 MIXED HYPERLIPIDEMIA: ICD-10-CM

## 2024-12-10 DIAGNOSIS — B99.9 RECURRENT INFECTIONS: ICD-10-CM

## 2024-12-10 LAB
ATYPICAL LYMPHOCYTE ABSOLUTE COUNT: 0.12 K/UL
ATYPICAL LYMPHOCYTES: 3 %
BASOPHILS # BLD: 0 K/UL (ref 0–0.2)
BASOPHILS NFR BLD: 0 % (ref 0–2)
CHOLEST SERPL-MCNC: 226 MG/DL (ref 0–199)
CHOLESTEROL/HDL RATIO: 3.5
EOSINOPHIL # BLD: 0.04 K/UL (ref 0–0.4)
EOSINOPHILS RELATIVE PERCENT: 1 % (ref 0–4)
ERYTHROCYTE [DISTWIDTH] IN BLOOD BY AUTOMATED COUNT: 14.9 % (ref 11.5–14.9)
HCT VFR BLD AUTO: 44 % (ref 41–53)
HDLC SERPL-MCNC: 64 MG/DL
HGB BLD-MCNC: 14.8 G/DL (ref 13.5–17.5)
LDLC SERPL CALC-MCNC: 149 MG/DL (ref 0–100)
LYMPHOCYTES NFR BLD: 2.18 K/UL (ref 1–4.8)
LYMPHOCYTES RELATIVE PERCENT: 56 % (ref 24–44)
MCH RBC QN AUTO: 30.9 PG (ref 26–34)
MCHC RBC AUTO-ENTMCNC: 33.6 G/DL (ref 31–37)
MCV RBC AUTO: 92 FL (ref 80–100)
MONOCYTES NFR BLD: 0.27 K/UL (ref 0.1–1.3)
MONOCYTES NFR BLD: 7 % (ref 1–7)
MORPHOLOGY: NORMAL
NEUTROPHILS NFR BLD: 33 % (ref 36–66)
NEUTS SEG NFR BLD: 1.29 K/UL (ref 1.3–9.1)
PLATELET # BLD AUTO: 243 K/UL (ref 150–450)
PMV BLD AUTO: 7.8 FL (ref 6–12)
RBC # BLD AUTO: 4.78 M/UL (ref 4.5–5.9)
TRIGL SERPL-MCNC: 64 MG/DL (ref 0–149)
WBC OTHER # BLD: 3.9 K/UL (ref 3.5–11)

## 2024-12-10 PROCEDURE — 80061 LIPID PANEL: CPT

## 2024-12-10 PROCEDURE — G8427 DOCREV CUR MEDS BY ELIG CLIN: HCPCS | Performed by: INTERNAL MEDICINE

## 2024-12-10 PROCEDURE — 99214 OFFICE O/P EST MOD 30 MIN: CPT | Performed by: INTERNAL MEDICINE

## 2024-12-10 PROCEDURE — 36415 COLL VENOUS BLD VENIPUNCTURE: CPT

## 2024-12-10 PROCEDURE — 3078F DIAST BP <80 MM HG: CPT | Performed by: INTERNAL MEDICINE

## 2024-12-10 PROCEDURE — 85025 COMPLETE CBC W/AUTO DIFF WBC: CPT

## 2024-12-10 PROCEDURE — 1036F TOBACCO NON-USER: CPT | Performed by: INTERNAL MEDICINE

## 2024-12-10 PROCEDURE — G8417 CALC BMI ABV UP PARAM F/U: HCPCS | Performed by: INTERNAL MEDICINE

## 2024-12-10 PROCEDURE — 3074F SYST BP LT 130 MM HG: CPT | Performed by: INTERNAL MEDICINE

## 2024-12-10 PROCEDURE — G8484 FLU IMMUNIZE NO ADMIN: HCPCS | Performed by: INTERNAL MEDICINE

## 2024-12-10 NOTE — TELEPHONE ENCOUNTER
AVS from 12/10/24    Verbal from [facility] next year     Schedule is not built for next year yet & PT'S AVS will be printed and placed in recall drawer and patient will be called once schedule built

## 2024-12-10 NOTE — PROGRESS NOTES
Patient ID: John Armenta, 1975, [id number], 49 y.o.  Referred by :  No ref. provider found   Reason for consultation: Lymphocytosis      HISTORY OF PRESENT ILLNESS:    Oncologic History:    John Armenta is a very pleasant 49 y.o. male.  Was referred for abnormal CBC/lymphocytosis, patient had CBC done on August 8, 2024 and did show WBC 3.9 hemoglobin 14.8 MCV 91.8 down to 33 lymphocyte percentage elevated at 58 and neutrophil percentage diminished to 51% however the absolute number of neutrophil is 1.21 and the absolute number of lymphocytes is normal, he did have the same abnormality for the last 10 years with a fluctuating neutropenia  Did report to the current infection recently      Interval history  No recurrent infection  Neutrophil count up to normal and lymphocyte in the normal limits  Flow cytometry no clonality  Peripheral blood smear normal      Past Medical History:   Diagnosis Date    Allergic rhinitis     Anxiety     Cervical spine pain 01/03/2019    Gastric reflux 03/30/2016    Headache(784.0)     Hepatitis     Hyperlipidemia 05/31/2018    Hypertension     IBS (irritable bowel syndrome) 12/17/2014    Insomnia     Iron deficiency anemia     Liver disease 12/2008    Lumbar degenerative disc disease 12/13/2021    Mild single current episode of major depressive disorder (HCC) 08/02/2018    Pancreatitis     Right lumbar radiculitis 01/13/2022    Seasonal allergies        Past Surgical History:   Procedure Laterality Date    ANKLE SURGERY  02/23/2014    COSMETIC SURGERY      HAND SURGERY  08/02/2013    URETHRA SURGERY  1985       Allergies   Allergen Reactions    Seasonal      DUST POLLEN       Current Outpatient Medications   Medication Sig Dispense Refill    cetirizine (ZYRTEC) 10 MG tablet TAKE 1 TABLET BY MOUTH DAILY 90 tablet 0    Omega-3 Fatty Acids (FISH OIL) 1000 MG capsule Take 2 capsules by mouth daily 60 capsule 3    citalopram (CELEXA) 10 MG tablet Take 1 tablet by mouth daily

## 2024-12-19 ENCOUNTER — OFFICE VISIT (OUTPATIENT)
Dept: FAMILY MEDICINE CLINIC | Age: 49
End: 2024-12-19

## 2024-12-19 VITALS
OXYGEN SATURATION: 98 % | HEIGHT: 68 IN | WEIGHT: 171 LBS | DIASTOLIC BLOOD PRESSURE: 70 MMHG | SYSTOLIC BLOOD PRESSURE: 120 MMHG | BODY MASS INDEX: 25.91 KG/M2 | HEART RATE: 67 BPM

## 2024-12-19 DIAGNOSIS — Z23 ENCOUNTER FOR IMMUNIZATION: ICD-10-CM

## 2024-12-19 DIAGNOSIS — M47.817 LUMBOSACRAL SPONDYLOSIS WITHOUT MYELOPATHY: ICD-10-CM

## 2024-12-19 DIAGNOSIS — I10 ESSENTIAL HYPERTENSION: ICD-10-CM

## 2024-12-19 DIAGNOSIS — E78.2 MIXED HYPERLIPIDEMIA: Primary | ICD-10-CM

## 2024-12-19 DIAGNOSIS — K58.0 IRRITABLE BOWEL SYNDROME WITH DIARRHEA: ICD-10-CM

## 2024-12-19 RX ORDER — ROSUVASTATIN CALCIUM 10 MG/1
10 TABLET, COATED ORAL NIGHTLY
Qty: 90 TABLET | Refills: 3 | Status: SHIPPED | OUTPATIENT
Start: 2024-12-19

## 2024-12-19 ASSESSMENT — ENCOUNTER SYMPTOMS
WHEEZING: 0
VOMITING: 0
COLOR CHANGE: 0
COUGH: 0
EYE REDNESS: 0
BACK PAIN: 1
BLOOD IN STOOL: 0
NAUSEA: 0
CONSTIPATION: 0
SHORTNESS OF BREATH: 0
SORE THROAT: 0
STRIDOR: 0
CHEST TIGHTNESS: 0
SINUS PRESSURE: 0
ABDOMINAL DISTENTION: 0
RECTAL PAIN: 0
DIARRHEA: 0
TROUBLE SWALLOWING: 0
RHINORRHEA: 0

## 2024-12-19 NOTE — PROGRESS NOTES
Visit Information    Have you changed or started any medications since your last visit including any over-the-counter medicines, vitamins, or herbal medicines? no   Are you having any side effects from any of your medications? -  no  Have you stopped taking any of your medications? Is so, why? -  no    Have you seen any other physician or provider since your last visit? No  Have you had any other diagnostic tests since your last visit? No  Have you been seen in the emergency room and/or had an admission to a hospital since we last saw you? No  Have you had your routine dental cleaning in the past 6 months? no    Have you activated your [employer] account? If not, what are your barriers? Yes     Patient Care Team:  Wan Ordonez MD as PCP - General (Family Medicine)  Wan Ordonez MD as PCP - Empaneled Provider    Medical History Review  Past Medical, Family, and Social History reviewed and does contribute to the patient presenting condition    Health Maintenance   Topic Date Due    Shingles vaccine (1 of 2) Never done    Pneumococcal 0-64 years Vaccine (2 of 2 - PCV) 08/02/2019    Colorectal Cancer Screen  09/01/2019    Flu vaccine (1) 08/01/2024    COVID-19 Vaccine (4 - 2023-24 season) 09/01/2024    Depression Monitoring  04/18/2025    A1C test (Diabetic or Prediabetic)  08/08/2025    DTaP/Tdap/Td vaccine (2 - Td or Tdap) 05/31/2028    Lipids  12/10/2029    Hepatitis C screen  Completed    HIV screen  Completed    Hepatitis A vaccine  Aged Out    Hib vaccine  Aged Out    Polio vaccine  Aged Out    Meningococcal (ACWY) vaccine  Aged Out    Hepatitis B vaccine  Discontinued

## 2024-12-19 NOTE — PROGRESS NOTES
Chief Complaint   Patient presents with    Hypertension    Hyperlipidemia         John Armenta  here today for follow up on chronic medical problems, go over labs and/or diagnostic studies, and medication refills. Hypertension and Hyperlipidemia      HPI: Patient is scheduled to discuss blood work.  Patient has hyperlipidemia which is uncontrolled, he tried statins in the past lipid reports he had allergies.  Patient did not try any other statins.  Patient reports he had hives and he stopped taking that.  His cholesterol is persistently high recent cholesterol test has increased from the previous.  Patient is willing to try another statin Crestor.  Patient reports he we will also work with the diet and physical activity.        Hypertension controlled, patient reports compliance with medications, blood pressure is running normal.  He ran out of the medication in the past for a month and his blood pressure started running high.  He denies any headaches.      Lumbar degenerative disc disease, reports he follows with pain management and has physical therapy scheduled.      Patient is due for colonoscopy, he has been referred to GI reports he has to reschedule his appointment.                  /70   Pulse 67   Ht 1.727 m (5' 7.99\")   Wt 77.6 kg (171 lb)   SpO2 98%   BMI 26.01 kg/m²    Body mass index is 26.01 kg/m².  Wt Readings from Last 3 Encounters:   12/19/24 77.6 kg (171 lb)   12/10/24 76.1 kg (167 lb 11.2 oz)   11/25/24 76.2 kg (168 lb)        []Negative depression screening.      4/18/2024     9:18 AM 8/18/2023     9:21 AM 11/8/2022    10:48 AM 7/18/2022     2:03 PM 9/13/2021     2:13 PM 2/4/2021     2:27 PM 5/31/2018    12:18 PM   PHQ Scores   PHQ2 Score 0 0 0 0 1 0 1   PHQ9 Score 0 0 0 1 1 0 1      []1-4 = Minimal depression   []5-9 = Milddepression   []10-14 = Moderate depression   []15-19 = Moderately severe depression   []20-27 = Severe depression    Discussed testing with the patient and all

## 2025-01-02 ENCOUNTER — OFFICE VISIT (OUTPATIENT)
Dept: ORTHOPEDIC SURGERY | Age: 50
End: 2025-01-02

## 2025-01-02 VITALS — HEIGHT: 67 IN | BODY MASS INDEX: 27.4 KG/M2 | RESPIRATION RATE: 14 BRPM | WEIGHT: 174.6 LBS

## 2025-01-02 DIAGNOSIS — M54.50 LOW BACK PAIN, UNSPECIFIED BACK PAIN LATERALITY, UNSPECIFIED CHRONICITY, UNSPECIFIED WHETHER SCIATICA PRESENT: Primary | ICD-10-CM

## 2025-01-02 DIAGNOSIS — M54.2 NECK PAIN: ICD-10-CM

## 2025-01-02 SDOH — HEALTH STABILITY: PHYSICAL HEALTH: ON AVERAGE, HOW MANY DAYS PER WEEK DO YOU ENGAGE IN MODERATE TO STRENUOUS EXERCISE (LIKE A BRISK WALK)?: 5 DAYS

## 2025-01-02 SDOH — HEALTH STABILITY: PHYSICAL HEALTH: ON AVERAGE, HOW MANY MINUTES DO YOU ENGAGE IN EXERCISE AT THIS LEVEL?: 90 MIN

## 2025-01-02 NOTE — PROGRESS NOTES
Patient ID: John Armenta is a 49 y.o. male    Chief Compliant:  Chief Complaint   Patient presents with    Back Pain     NP: Low back pain radiates down left leg with tingling and numbness    Neck Pain     NP: neck pain      HPI:  Patient is a 49 y.o. male who presents to the clinic today for evaluation of neck and low back pain this has been going on for 2 to 3 years.  In regards to the neck pain patient reports that it is mainly into his parascapular region does not radiate down his arms denies any numbness or tingling in his upper extremities.  Patient has tried taking Lyrica Lidoderm patches and seen a chiropractor without much relief.  He is also done physical therapy for his neck and low back.  In regards to his low back he states that he has had the similar symptoms for that same amount of time in his left leg will have paresthesias as well as radiculopathy like symptoms.  Patient also reports back in July 2021 he had an incident at work that initiated most of this pain.  And has been completing physical therapy his last session of physical therapy was in October 2024.  Most of his pain is to the anterior lateral leg of the left leg.  He denies any groin pain or any recent fall trauma or injury.  Denies any previous surgeries or seeing pain management for injections in his spine..        Review of Systems   Constitutional: Negative for fever, chills, sweats.   Eyes: Negative for changes in vision, or pain.   HENT: Negative for ear ache, epistaxis, or sore throat.  Respiratory/Cardio: Negative for Chest pain, palpitations, SOB, or cough.  Gastrointestinal: Negative for abdominal pain, N/V/D.   Genitourinary: Negative for dysuria, frequency, urgency, or hematuria.   Neurological: Negative for headache, numbness, or weakness.   Integumentary: Negative for rash, itching, laceration, or abrasion.   Musculoskeletal: Positive for Back Pain (NP: Low back pain radiates down left leg with tingling and numbness)

## 2025-01-08 ENCOUNTER — HOSPITAL ENCOUNTER (OUTPATIENT)
Dept: PAIN MANAGEMENT | Age: 50
Discharge: HOME | End: 2025-01-08
Payer: COMMERCIAL

## 2025-01-08 VITALS — BODY MASS INDEX: 26.21 KG/M2 | HEIGHT: 67 IN | WEIGHT: 167 LBS

## 2025-01-08 DIAGNOSIS — M54.50 CHRONIC BILATERAL LOW BACK PAIN WITHOUT SCIATICA: ICD-10-CM

## 2025-01-08 DIAGNOSIS — M54.16 LUMBAR RADICULOPATHY: Primary | ICD-10-CM

## 2025-01-08 DIAGNOSIS — M47.817 LUMBOSACRAL SPONDYLOSIS WITHOUT MYELOPATHY: ICD-10-CM

## 2025-01-08 DIAGNOSIS — G89.29 CHRONIC BILATERAL LOW BACK PAIN WITHOUT SCIATICA: ICD-10-CM

## 2025-01-08 PROCEDURE — 99213 OFFICE O/P EST LOW 20 MIN: CPT

## 2025-01-08 PROCEDURE — 99213 OFFICE O/P EST LOW 20 MIN: CPT | Performed by: NURSE PRACTITIONER

## 2025-01-08 RX ORDER — PREGABALIN 25 MG/1
25 CAPSULE ORAL 2 TIMES DAILY
Qty: 60 CAPSULE | Refills: 5 | Status: SHIPPED | OUTPATIENT
Start: 2025-01-08 | End: 2025-07-07

## 2025-01-08 ASSESSMENT — PAIN SCALES - GENERAL: PAINLEVEL_OUTOF10: 4

## 2025-01-08 ASSESSMENT — ENCOUNTER SYMPTOMS
COUGH: 0
CONSTIPATION: 0
BACK PAIN: 1
SHORTNESS OF BREATH: 0
BOWEL INCONTINENCE: 0

## 2025-01-08 NOTE — PROGRESS NOTES
IMPRESSION:  Straightening of lumbar lordosis.     Transitional lumbar anatomy.     At L4-L5, grade 1 retrolisthesis, mild bilateral neural foraminal narrowing.  Left foraminal disc extrusion with superior migration contacts exiting left  L4 nerve root.     At L5-S1,Grade 1 retrolisthesis and mild bilateral neural foraminal narrowing.       Assessment:  Problem List Items Addressed This Visit       Lumbosacral spondylosis without myelopathy    Chronic bilateral low back pain without sciatica    Relevant Medications    pregabalin (LYRICA) 25 MG capsule    Lumbar radiculopathy - Primary    Relevant Medications    pregabalin (LYRICA) 25 MG capsule          Treatment Plan:  Continue Lyrica  Continue chiropractic care  Continue to follow with orthopedics  Follow up appointment made for 6 months    I have reviewed the chief complaint and history of present illness (including ROS and PFSH) and vital documentation by my staff and I agree with their documentation and have added where applicable.

## 2025-01-13 ENCOUNTER — HOSPITAL ENCOUNTER (OUTPATIENT)
Dept: MRI IMAGING | Age: 50
Discharge: HOME | End: 2025-01-15
Payer: COMMERCIAL

## 2025-01-13 DIAGNOSIS — M54.50 LOW BACK PAIN, UNSPECIFIED BACK PAIN LATERALITY, UNSPECIFIED CHRONICITY, UNSPECIFIED WHETHER SCIATICA PRESENT: ICD-10-CM

## 2025-01-13 DIAGNOSIS — M54.2 NECK PAIN: ICD-10-CM

## 2025-01-13 PROCEDURE — 72141 MRI NECK SPINE W/O DYE: CPT

## 2025-01-13 PROCEDURE — 72148 MRI LUMBAR SPINE W/O DYE: CPT

## 2025-01-14 ENCOUNTER — TELEPHONE (OUTPATIENT)
Dept: ORTHOPEDIC SURGERY | Age: 50
End: 2025-01-14

## 2025-01-14 NOTE — TELEPHONE ENCOUNTER
Patient was called regarding the providers response. Appointment made with Dr. Graff to go over MRI results.    Attending Only

## 2025-01-27 ENCOUNTER — TELEPHONE (OUTPATIENT)
Dept: FAMILY MEDICINE CLINIC | Age: 50
End: 2025-01-27

## 2025-01-27 ENCOUNTER — HOSPITAL ENCOUNTER (EMERGENCY)
Age: 50
Discharge: HOME | End: 2025-01-27
Attending: EMERGENCY MEDICINE
Payer: COMMERCIAL

## 2025-01-27 ENCOUNTER — APPOINTMENT (OUTPATIENT)
Dept: GENERAL RADIOLOGY | Age: 50
End: 2025-01-27
Payer: COMMERCIAL

## 2025-01-27 VITALS
RESPIRATION RATE: 18 BRPM | WEIGHT: 168 LBS | OXYGEN SATURATION: 100 % | BODY MASS INDEX: 25.46 KG/M2 | SYSTOLIC BLOOD PRESSURE: 130 MMHG | HEART RATE: 68 BPM | DIASTOLIC BLOOD PRESSURE: 88 MMHG | TEMPERATURE: 97.9 F | HEIGHT: 68 IN

## 2025-01-27 DIAGNOSIS — J06.9 UPPER RESPIRATORY TRACT INFECTION, UNSPECIFIED TYPE: Primary | ICD-10-CM

## 2025-01-27 LAB
FLUAV RNA RESP QL NAA+PROBE: NOT DETECTED
FLUBV RNA RESP QL NAA+PROBE: NOT DETECTED
SARS-COV-2 RNA RESP QL NAA+PROBE: NOT DETECTED
SOURCE: NORMAL
SPECIMEN DESCRIPTION: NORMAL
SPECIMEN SOURCE: NORMAL
STREP A, MOLECULAR: NEGATIVE

## 2025-01-27 PROCEDURE — 87636 SARSCOV2 & INF A&B AMP PRB: CPT

## 2025-01-27 PROCEDURE — 6370000000 HC RX 637 (ALT 250 FOR IP): Performed by: EMERGENCY MEDICINE

## 2025-01-27 PROCEDURE — 71045 X-RAY EXAM CHEST 1 VIEW: CPT

## 2025-01-27 PROCEDURE — 87651 STREP A DNA AMP PROBE: CPT

## 2025-01-27 PROCEDURE — 99284 EMERGENCY DEPT VISIT MOD MDM: CPT

## 2025-01-27 RX ORDER — BENZONATATE 100 MG/1
100 CAPSULE ORAL ONCE
Status: COMPLETED | OUTPATIENT
Start: 2025-01-27 | End: 2025-01-27

## 2025-01-27 RX ORDER — BENZONATATE 100 MG/1
100 CAPSULE ORAL 3 TIMES DAILY PRN
Qty: 30 CAPSULE | Refills: 0 | Status: SHIPPED | OUTPATIENT
Start: 2025-01-27 | End: 2025-02-06

## 2025-01-27 RX ORDER — IBUPROFEN 800 MG/1
800 TABLET, FILM COATED ORAL ONCE
Status: COMPLETED | OUTPATIENT
Start: 2025-01-27 | End: 2025-01-27

## 2025-01-27 RX ADMIN — IBUPROFEN 800 MG: 800 TABLET, FILM COATED ORAL at 09:33

## 2025-01-27 RX ADMIN — BENZONATATE 100 MG: 100 CAPSULE ORAL at 09:33

## 2025-01-27 ASSESSMENT — ENCOUNTER SYMPTOMS
CHEST TIGHTNESS: 0
TROUBLE SWALLOWING: 0
COUGH: 1
SHORTNESS OF BREATH: 0
EYE PAIN: 0
SINUS PRESSURE: 0
CONSTIPATION: 0
COLOR CHANGE: 0
WHEEZING: 0
ABDOMINAL PAIN: 0
SORE THROAT: 1
DIARRHEA: 0
FACIAL SWELLING: 0
VOMITING: 0
RHINORRHEA: 0
EYE REDNESS: 0
BLOOD IN STOOL: 0
BACK PAIN: 0
NAUSEA: 0
EYE DISCHARGE: 0

## 2025-01-27 ASSESSMENT — PAIN - FUNCTIONAL ASSESSMENT: PAIN_FUNCTIONAL_ASSESSMENT: 0-10

## 2025-01-27 ASSESSMENT — PAIN SCALES - GENERAL: PAINLEVEL_OUTOF10: 6

## 2025-01-27 NOTE — ED PROVIDER NOTES
eMERGENCY dEPARTMENT eNCOUnter      Pt Name: John Armenta  MRN: [id number]  Birthdate 1975  Date of evaluation: 1/27/25      CHIEF COMPLAINT       Chief Complaint   Patient presents with    Cough    Pharyngitis    Ear Pain    Chest Pain    Nasal Congestion         HISTORY OF PRESENT ILLNESS    John Armenta is a 49 y.o. male who presents complaining of sore throat.  Patient states for last couple days he has had congestion headache sore throat chest discomfort cough.  Patient states that his girls and his boss have had similar illnesses but no diagnosis is.  Patient has a history of high blood pressure but no lung issues.  Patient has not been vomiting or having any diarrhea.      REVIEW OF SYSTEMS       Review of Systems   Constitutional:  Negative for activity change, appetite change, chills, diaphoresis and fever.   HENT:  Positive for congestion and sore throat. Negative for ear pain, facial swelling, nosebleeds, rhinorrhea, sinus pressure and trouble swallowing.    Eyes:  Negative for pain, discharge and redness.   Respiratory:  Positive for cough. Negative for chest tightness, shortness of breath and wheezing.    Cardiovascular:  Negative for chest pain, palpitations and leg swelling.   Gastrointestinal:  Negative for abdominal pain, blood in stool, constipation, diarrhea, nausea and vomiting.   Genitourinary:  Negative for difficulty urinating, dysuria, flank pain, frequency, genital sores and hematuria.   Musculoskeletal:  Positive for myalgias. Negative for arthralgias, back pain, gait problem, joint swelling and neck pain.   Skin:  Negative for color change, pallor, rash and wound.   Neurological:  Positive for headaches. Negative for dizziness, tremors, seizures, syncope, speech difficulty, weakness and numbness.   Psychiatric/Behavioral:  Negative for confusion, decreased concentration, hallucinations, self-injury, sleep disturbance and suicidal ideas.        PAST MEDICAL HISTORY     Past Medical

## 2025-01-27 NOTE — TELEPHONE ENCOUNTER
[facility] ED Follow up Call    Reason for ED visit:  UPPER RESPITORY           FU appts/Provider:    Future Appointments   Date Time Provider Department Center   2/4/2025  8:50 AM Duc Graff MD [facility]   4/21/2025  9:00 AM Wan Ordonez MD [facility] DEP   6/9/2025  8:00 AM Elijah Wheeler,  [facility] [place]       VOICEMAIL DOCUMENTATION - ERASE IF NOT USED  Hi, this message is for  PRETTY  This is DALE from Wan Joyce office. Just calling to see how you are doing after your recent visit to the Emergency Room. Wan Joyce wants to make sure you were able to fill any prescriptions and that you understand your discharge instructions. Please return our call if you need to make a follow up appointment with your provider or have any further needs.   Our phone number is [phone]*. Have a great day.

## 2025-01-29 ENCOUNTER — TELEPHONE (OUTPATIENT)
Dept: FAMILY MEDICINE CLINIC | Age: 50
End: 2025-01-29

## 2025-01-29 NOTE — TELEPHONE ENCOUNTER
[facility] ED Follow up Call    Reason for ED visit:    ED  Discharged  1/27/2025 (1 hours)  [facility] Emergency Department     Veto Dinh MD  Last attending  Treatment team Upper respiratory tract infection, unspecified type  Clinical impression Cough  Pharyngitis  Ear Pain  Chest Pain  Nasal Congestion  Chief complaint          Hi John      LIZZY appts/Provider:      Future Appointments   Date Time Provider Department Center   2/4/2025  8:50 AM Duc Graff MD [facility]   4/21/2025  9:00 AM Wan Ordonez MD [facility] DEP   6/9/2025  8:00 AM Elijah Wheeler,  [facility] [place]       VOICEMAIL DOCUMENTATION - ERASE IF NOT USED  Hi, this message is for  John    This is Karon Crawley from [facility] Joyce office. Just calling to see how you are doing after your recent visit to the Emergency Room. Wan Joyce wants to make sure you were able to fill any prescriptions and that you understand your discharge instructions. Please return our call if you need to make a follow up appointment with your provider or have any further needs.   Our phone number is [phone].     Have a great day!

## 2025-01-31 ENCOUNTER — TELEPHONE (OUTPATIENT)
Dept: FAMILY MEDICINE CLINIC | Age: 50
End: 2025-01-31

## 2025-01-31 NOTE — TELEPHONE ENCOUNTER
[facility] ED Follow up Call    Reason for ED visit:  UPPER RESPIRATORY INFECTION           FU appts/Provider:    Future Appointments   Date Time Provider Department Center   2/4/2025  8:50 AM Duc Graff MD [facility]   4/21/2025  9:00 AM Wan Ordonez MD [facility] E[facility] DEP   6/9/2025  8:00 AM Elijah Wheeler,  [facility] [place]       VOICEMAIL DOCUMENTATION - ERASE IF NOT USED  Hi, this message is for  PRETTY  This is DALE from Wan Joyce office. Just calling to see how you are doing after your recent visit to the Emergency Room. Wan Joyce wants to make sure you were able to fill any prescriptions and that you understand your discharge instructions. Please return our call if you need to make a follow up appointment with your provider or have any further needs.   Our phone number is [phone]*. Have a great day.

## 2025-02-04 ENCOUNTER — OFFICE VISIT (OUTPATIENT)
Dept: ORTHOPEDIC SURGERY | Age: 50
End: 2025-02-04

## 2025-02-04 VITALS — WEIGHT: 168 LBS | HEIGHT: 68 IN | RESPIRATION RATE: 14 BRPM | BODY MASS INDEX: 25.46 KG/M2

## 2025-02-04 DIAGNOSIS — M54.2 NECK PAIN: ICD-10-CM

## 2025-02-04 DIAGNOSIS — G89.29 CHRONIC MIDLINE LOW BACK PAIN WITH LEFT-SIDED SCIATICA: Primary | ICD-10-CM

## 2025-02-04 DIAGNOSIS — M54.42 CHRONIC MIDLINE LOW BACK PAIN WITH LEFT-SIDED SCIATICA: Primary | ICD-10-CM

## 2025-02-04 PROCEDURE — 99213 OFFICE O/P EST LOW 20 MIN: CPT | Performed by: ORTHOPAEDIC SURGERY

## 2025-02-04 SDOH — HEALTH STABILITY: PHYSICAL HEALTH: ON AVERAGE, HOW MANY DAYS PER WEEK DO YOU ENGAGE IN MODERATE TO STRENUOUS EXERCISE (LIKE A BRISK WALK)?: 3 DAYS

## 2025-02-04 NOTE — PROGRESS NOTES
Patient ID: John Armenta is a 49 y.o. male    Chief Compliant:  No chief complaint on file.       Diagnostic imaging:      Diagnostic x-rays and MRI cervical spine some degenerative disc disease greatest C3-C6 with some disc space narrowing no high-grade stenosis malalignment or instability    MRI and x-rays lumbar spine normal hips age-appropriate x-rays lumbar spine MRI demonstrates some degenerative disc disease L3-5 with disc dehydration a very narrow L5-S1 disc no significant stenosis malalignment or instability      Assessment and Plan:  1. Chronic midline low back pain with left-sided sciatica    2. 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 Wheeler    Patient is already done physical therapy and continues to use chiropractic care which is beneficial to some degree    Patient reports some degree of sclerotomal relief with the Lyrica    Follow up prn    HPI:  This is a 49 y.o. male who presents to the clinic today for follow up of low back pain radiates down left leg with tingling and numbness and neck pain, previously seeing Giuliana Bueno.     Chronic low back pain and neck pain since 2021    Left lateral thigh and lateral shin pain    Screws in left ankle    Has completed PT    Currently seeing Dr. Wheeler pain management    See's chiropractor    Patient relates his pain to a Workmen's Comp. comp injury at work lifting a refrigerator as the origin of date of his pain    Review of Systems   All other systems reviewed and are negative.      Past History:    Current Outpatient Medications:     benzonatate (TESSALON) 100 MG capsule, Take 1 capsule by mouth 3 times daily as needed for Cough, Disp: 30 capsule, Rfl: 0    pregabalin (LYRICA) 25 MG capsule, Take 1 capsule by mouth 2 times daily for 180 days. Max Daily Amount: 50

## 2025-04-21 DIAGNOSIS — E78.2 MIXED HYPERLIPIDEMIA: ICD-10-CM

## 2025-04-21 DIAGNOSIS — K58.0 IRRITABLE BOWEL SYNDROME WITH DIARRHEA: ICD-10-CM

## 2025-04-21 DIAGNOSIS — J30.2 SEASONAL ALLERGIES: ICD-10-CM

## 2025-04-21 RX ORDER — CHLORAL HYDRATE 500 MG
2000 CAPSULE ORAL DAILY
Qty: 60 CAPSULE | Refills: 0 | Status: SHIPPED | OUTPATIENT
Start: 2025-04-21

## 2025-04-21 RX ORDER — CETIRIZINE HYDROCHLORIDE 10 MG/1
TABLET ORAL
Qty: 90 TABLET | Refills: 0 | Status: SHIPPED | OUTPATIENT
Start: 2025-04-21

## 2025-04-21 RX ORDER — CITALOPRAM HYDROBROMIDE 10 MG/1
10 TABLET ORAL DAILY
Qty: 30 TABLET | Refills: 0 | Status: SHIPPED | OUTPATIENT
Start: 2025-04-21

## 2025-04-21 NOTE — TELEPHONE ENCOUNTER
Please call patient to schedule an appointment.  Patient no showed for today's appointment please notify patient to reschedule appointment.

## 2025-04-21 NOTE — TELEPHONE ENCOUNTER
Please Approve or Refuse.  Send to Pharmacy per Pt's Request:      Next Visit Date:  Visit date not found   Last Visit Date: 12/19/2024    Hemoglobin A1C (%)   Date Value   08/08/2024 5.8   08/18/2023 5.8   11/08/2022 5.7             ( goal A1C is < 7)   BP Readings from Last 3 Encounters:   01/27/25 130/88   12/19/24 120/70   12/10/24 126/79          (goal 120/80)  BUN   Date Value Ref Range Status   11/25/2024 13 6 - 20 mg/dL Final     Creatinine   Date Value Ref Range Status   11/25/2024 1.0 0.7 - 1.2 mg/dL Final     Potassium   Date Value Ref Range Status   11/25/2024 3.9 3.7 - 5.3 mmol/L Final

## 2025-05-21 DIAGNOSIS — I10 ESSENTIAL HYPERTENSION: ICD-10-CM

## 2025-05-21 RX ORDER — LOSARTAN POTASSIUM 50 MG/1
75 TABLET ORAL DAILY
Qty: 135 TABLET | Refills: 2 | Status: SHIPPED | OUTPATIENT
Start: 2025-05-21

## 2025-06-25 DIAGNOSIS — K58.0 IRRITABLE BOWEL SYNDROME WITH DIARRHEA: ICD-10-CM

## 2025-06-25 RX ORDER — CITALOPRAM HYDROBROMIDE 10 MG/1
10 TABLET ORAL DAILY
Qty: 30 TABLET | Refills: 0 | Status: SHIPPED | OUTPATIENT
Start: 2025-06-25

## 2025-06-25 NOTE — TELEPHONE ENCOUNTER
Pt stated he can't make an appt until he gets new insurance , pt stated he got the medication transferred to [facility]